# Patient Record
Sex: FEMALE | Race: WHITE | NOT HISPANIC OR LATINO | Employment: PART TIME | URBAN - NONMETROPOLITAN AREA
[De-identification: names, ages, dates, MRNs, and addresses within clinical notes are randomized per-mention and may not be internally consistent; named-entity substitution may affect disease eponyms.]

---

## 2017-01-06 ENCOUNTER — TRANSFERRED RECORDS (OUTPATIENT)
Dept: HEALTH INFORMATION MANAGEMENT | Facility: HOSPITAL | Age: 57
End: 2017-01-06

## 2017-02-23 ENCOUNTER — OFFICE VISIT (OUTPATIENT)
Dept: FAMILY MEDICINE | Facility: OTHER | Age: 57
End: 2017-02-23
Attending: PHYSICIAN ASSISTANT
Payer: COMMERCIAL

## 2017-02-23 ENCOUNTER — TELEPHONE (OUTPATIENT)
Dept: FAMILY MEDICINE | Facility: OTHER | Age: 57
End: 2017-02-23

## 2017-02-23 VITALS
OXYGEN SATURATION: 94 % | DIASTOLIC BLOOD PRESSURE: 76 MMHG | HEART RATE: 67 BPM | WEIGHT: 149 LBS | BODY MASS INDEX: 27.42 KG/M2 | HEIGHT: 62 IN | SYSTOLIC BLOOD PRESSURE: 106 MMHG | TEMPERATURE: 98 F

## 2017-02-23 DIAGNOSIS — Z71.89 ADVANCED DIRECTIVES, COUNSELING/DISCUSSION: Chronic | ICD-10-CM

## 2017-02-23 DIAGNOSIS — Z01.818 PREOP GENERAL PHYSICAL EXAM: Primary | ICD-10-CM

## 2017-02-23 LAB
ALBUMIN SERPL-MCNC: 4 G/DL (ref 3.4–5)
ALBUMIN UR-MCNC: NEGATIVE MG/DL
ALP SERPL-CCNC: 69 U/L (ref 40–150)
ALT SERPL W P-5'-P-CCNC: 20 U/L (ref 0–50)
AMORPH CRY #/AREA URNS HPF: ABNORMAL /HPF
ANION GAP SERPL CALCULATED.3IONS-SCNC: 7 MMOL/L (ref 3–14)
APPEARANCE UR: CLEAR
AST SERPL W P-5'-P-CCNC: 26 U/L (ref 0–45)
BACTERIA #/AREA URNS HPF: ABNORMAL /HPF
BASOPHILS # BLD AUTO: 0 10E9/L (ref 0–0.2)
BASOPHILS NFR BLD AUTO: 0.6 %
BILIRUB SERPL-MCNC: 0.4 MG/DL (ref 0.2–1.3)
BILIRUB UR QL STRIP: NEGATIVE
BUN SERPL-MCNC: 12 MG/DL (ref 7–30)
CALCIUM SERPL-MCNC: 9.1 MG/DL (ref 8.5–10.1)
CHLORIDE SERPL-SCNC: 105 MMOL/L (ref 94–109)
CO2 SERPL-SCNC: 31 MMOL/L (ref 20–32)
COLOR UR AUTO: ABNORMAL
CREAT SERPL-MCNC: 0.64 MG/DL (ref 0.52–1.04)
DIFFERENTIAL METHOD BLD: NORMAL
EOSINOPHIL # BLD AUTO: 0.2 10E9/L (ref 0–0.7)
EOSINOPHIL NFR BLD AUTO: 3 %
ERYTHROCYTE [DISTWIDTH] IN BLOOD BY AUTOMATED COUNT: 12.2 % (ref 10–15)
ERYTHROCYTE [SEDIMENTATION RATE] IN BLOOD BY WESTERGREN METHOD: 9 MM/H (ref 0–30)
GFR SERPL CREATININE-BSD FRML MDRD: NORMAL ML/MIN/1.7M2
GLUCOSE SERPL-MCNC: 84 MG/DL (ref 70–99)
GLUCOSE UR STRIP-MCNC: NEGATIVE MG/DL
HCT VFR BLD AUTO: 39.6 % (ref 35–47)
HGB BLD-MCNC: 13.7 G/DL (ref 11.7–15.7)
HGB UR QL STRIP: NEGATIVE
IMM GRANULOCYTES # BLD: 0 10E9/L (ref 0–0.4)
IMM GRANULOCYTES NFR BLD: 0.1 %
KETONES UR STRIP-MCNC: NEGATIVE MG/DL
LEUKOCYTE ESTERASE UR QL STRIP: NEGATIVE
LYMPHOCYTES # BLD AUTO: 2.9 10E9/L (ref 0.8–5.3)
LYMPHOCYTES NFR BLD AUTO: 41.1 %
MCH RBC QN AUTO: 29.8 PG (ref 26.5–33)
MCHC RBC AUTO-ENTMCNC: 34.6 G/DL (ref 31.5–36.5)
MCV RBC AUTO: 86 FL (ref 78–100)
MONOCYTES # BLD AUTO: 0.3 10E9/L (ref 0–1.3)
MONOCYTES NFR BLD AUTO: 4.6 %
MUCOUS THREADS #/AREA URNS LPF: PRESENT /LPF
NEUTROPHILS # BLD AUTO: 3.5 10E9/L (ref 1.6–8.3)
NEUTROPHILS NFR BLD AUTO: 50.6 %
NITRATE UR QL: NEGATIVE
NRBC # BLD AUTO: 0 10*3/UL
NRBC BLD AUTO-RTO: 0 /100
PH UR STRIP: 7 PH (ref 4.7–8)
PLATELET # BLD AUTO: 289 10E9/L (ref 150–450)
POTASSIUM SERPL-SCNC: 4 MMOL/L (ref 3.4–5.3)
PROT SERPL-MCNC: 7.4 G/DL (ref 6.8–8.8)
RBC # BLD AUTO: 4.6 10E12/L (ref 3.8–5.2)
RBC #/AREA URNS AUTO: 1 /HPF (ref 0–2)
SODIUM SERPL-SCNC: 143 MMOL/L (ref 133–144)
SP GR UR STRIP: 1.01 (ref 1–1.03)
URN SPEC COLLECT METH UR: ABNORMAL
UROBILINOGEN UR STRIP-MCNC: NORMAL MG/DL (ref 0–2)
WBC # BLD AUTO: 6.9 10E9/L (ref 4–11)
WBC #/AREA URNS AUTO: 1 /HPF (ref 0–2)

## 2017-02-23 PROCEDURE — 81001 URINALYSIS AUTO W/SCOPE: CPT | Performed by: PHYSICIAN ASSISTANT

## 2017-02-23 PROCEDURE — 85652 RBC SED RATE AUTOMATED: CPT | Performed by: PHYSICIAN ASSISTANT

## 2017-02-23 PROCEDURE — 93000 ELECTROCARDIOGRAM COMPLETE: CPT | Performed by: INTERNAL MEDICINE

## 2017-02-23 PROCEDURE — 99214 OFFICE O/P EST MOD 30 MIN: CPT | Mod: 25 | Performed by: PHYSICIAN ASSISTANT

## 2017-02-23 PROCEDURE — 80053 COMPREHEN METABOLIC PANEL: CPT | Performed by: PHYSICIAN ASSISTANT

## 2017-02-23 PROCEDURE — 36415 COLL VENOUS BLD VENIPUNCTURE: CPT | Performed by: PHYSICIAN ASSISTANT

## 2017-02-23 PROCEDURE — 85025 COMPLETE CBC W/AUTO DIFF WBC: CPT | Performed by: PHYSICIAN ASSISTANT

## 2017-02-23 RX ORDER — GUAIFENESIN/EPHEDRINE HCL 200-12.5MG
200 TABLET ORAL DAILY
COMMUNITY
End: 2017-12-14

## 2017-02-23 RX ORDER — MULTIPLE VITAMINS W/ MINERALS TAB 9MG-400MCG
1 TAB ORAL DAILY
COMMUNITY
End: 2019-08-14

## 2017-02-23 ASSESSMENT — PAIN SCALES - GENERAL: PAINLEVEL: SEVERE PAIN (7)

## 2017-02-23 NOTE — PROGRESS NOTES
[facility] [place]  [address]  [place] [address]  [phone]  Dept: [phone]    PRE-OP EVALUATION:  Today's date: 2017    Estella ORO Aas (: 1960) presents for pre-operative evaluation assessment as requested by Dr. Biswas.  She requires evaluation and anesthesia risk assessment prior to undergoing surgery/procedure for treatment of total hip .  Proposed procedure: total hip surgery    Date of Surgery/ Procedure: 2017  Time of Surgery/ Procedure:   Hospital/Surgical Facility: [facility]    Primary Physician: Mia Villagomez  Type of Anesthesia Anticipated: to be determined    Patient has a Health Care Directive or Living Will:  NO    1. NO - Do you have a history of heart attack, stroke, stent, bypass or surgery on an artery in the head, neck, heart or legs?  2. NO - Do you ever have any pain or discomfort in your chest?  3. NO - Do you have a history of  Heart Failure?  4. NO - Are you troubled by shortness of breath when: walking on the level, up a slight hill or at night?  5. NO - Do you currently have a cold, bronchitis or other respiratory infection?  6. NO - Do you have a cough, shortness of breath or wheezing?  7. NO - Do you sometimes get pains in the calves of your legs when you walk?  8. NO - Do you or anyone in your family have previous history of blood clots?  9. NO - Do you or does anyone in your family have a serious bleeding problem such as prolonged bleeding following surgeries or cuts?  10. NO - Have you ever had problems with anemia or been told to take iron pills?  11. NO - Have you had any abnormal blood loss such as black, tarry or bloody stools, or abnormal vaginal bleeding?  12. NO - Have you ever had a blood transfusion?  13. NO - Have you or any of your relatives ever had problems with anesthesia?  14. NO - Do you have sleep apnea, excessive snoring or daytime drowsiness?  15. NO - Do you have any prosthetic heart valves?  16. NO - Do you have  "prosthetic joints?  17. NO - Is there any chance that you may be pregnant?      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  She will be having a replacement on Wednesday 3/2/17 at [facility] in [place].       See problem list for active medical problems.  Problems all longstanding and stable, except as noted/documented.  See ROS for pertinent symptoms related to these conditions.                                                                                                  .    MEDICAL HISTORY:                                                    There are no active problems to display for this patient.     Past Medical History   Diagnosis Date     Depressive disorder, not elsewhere classified 7/23/2008     Dysthymic disorder 8/20/2008     Past Surgical History   Procedure Laterality Date     [place]       Hand  20009     RT     Colonoscopy  5/20/2006     Current Outpatient Prescriptions   Medication Sig Dispense Refill     multivitamin, therapeutic with minerals (MULTI-VITAMIN) TABS tablet Take 1 tablet by mouth daily       calcium-vitamin D (CALTRATE) 600-400 MG-UNIT per tablet Take 1 tablet by mouth 2 times daily       5-Hydroxytryptophan (5-HTP) 100 MG CAPS Take 200 mg by mouth daily       cholecalciferol (VITAMIN D3) 5000 UNITS TABS tablet Take 10,000 Units by mouth daily       [DISCONTINUED] Cholecalciferol (VITAMIN D) 2000 UNITS CAPS Take 1 tablet by mouth daily        OTC products: None, except as noted above    Allergies   Allergen Reactions     Cats      \"Cat/feline product derivatives\"      Latex Allergy: NO    Social History   Substance Use Topics     Smoking status: Light Tobacco Smoker     Packs/day: 0.50     Types: Cigarettes     Smokeless tobacco: Never Used      Comment: Quit 1990; no passive exposure     Alcohol use Yes      Comment: Socially     History   Drug Use No       REVIEW OF SYSTEMS:                  " "                                  C: NEGATIVE for fever, chills, change in weight  I: NEGATIVE for worrisome rashes, moles or lesions  E: NEGATIVE for vision changes or irritation  E/M: NEGATIVE for ear, mouth and throat problems  R: NEGATIVE for significant cough or SOB  CV: NEGATIVE for chest pain, palpitations or peripheral edema  GI: NEGATIVE for nausea, abdominal pain, heartburn, or change in bowel habits  : NEGATIVE for frequency, dysuria, or hematuria  M: NEGATIVE for significant arthralgias or myalgia  N: NEGATIVE for weakness, dizziness or paresthesias  E: NEGATIVE for temperature intolerance, skin/hair changes  H: NEGATIVE for bleeding problems  P: NEGATIVE for changes in mood or affect    EXAM:                                                    /76 (BP Location: Left arm, Patient Position: Chair, Cuff Size: Adult Regular)  Pulse 67  Temp 98  F (36.7  C) (Tympanic)  Ht 5' 1.5\" (1.562 m)  Wt 149 lb (67.6 kg)  SpO2 94%  BMI 27.7 kg/m2    GENERAL APPEARANCE: healthy, alert and no distress     EYES: EOMI,- PERRL     HENT: ear canals and TM's normal and nose and mouth without ulcers or lesions     NECK: no adenopathy, no asymmetry, masses, or scars and thyroid normal to palpation     RESP: lungs clear to auscultation - no rales, rhonchi or wheezes     CV: regular rates and rhythm, normal S1 S2, no S3 or S4 and no murmur, click or rub -     ABDOMEN:  soft, nontender, no HSM or masses and bowel sounds normal     MS: she is in severe pain and compensating with sitting walking.      SKIN: no suspicious lesions or rashes     NEURO: Normal strength and tone, sensory exam grossly normal     PSYCH: mentation appears normal. and affect normal/bright     LYMPHATICS: No axillary, cervical, inguinal, or supraclavicular nodes    DIAGNOSTICS:                                                    EKG: appears normal, NSR, normal axis, normal intervals, no acute ST/T changes c/w ischemia, no LVH by voltage criteria, " unchanged from previous tracings    Recent Labs   Lab Test  01/20/16   0848   HGB  13.0   PLT  335   NA  140   POTASSIUM  4.1   CR  0.71        IMPRESSION:                                                    Reason for surgery/procedure: left hip arthroplasty   Diagnosis/reason for consult: medical clear     The proposed surgical procedure is considered INTERMEDIATE risk.    REVISED CARDIAC RISK INDEX  The patient has the following serious cardiovascular risks for perioperative complications such as (MI, PE, VFib and 3  AV Block):  No serious cardiac risks  INTERPRETATION: 0 risks: Class I (very low risk - 0.4% complication rate)    The patient has the following additional risks for perioperative complications:          RECOMMENDATIONS:                                                      1. Advanced directives, counseling/discussion  Given     2. Preop general physical exam  Optimized. She will be seeing us back as recommended post op. She is also having an injection of her left thumb under anesthesia.   - EKG 12-lead complete w/read - (Clinic Performed)  - CBC with platelets and differential  - UA with Microscopic reflex to Culture  - Comprehensive metabolic panel  - Erythrocyte sedimentation rate auto      Pt has no medications to take.     APPROVAL GIVEN to proceed with proposed procedure, without further diagnostic evaluation       Signed Electronically by: FELIX Joe    Copy of this evaluation report is provided to requesting physician.    [facility] Preop Guidelines

## 2017-02-23 NOTE — MR AVS SNAPSHOT
After Visit Summary   2/23/2017    Estella ORO Aas    MRN: [id number]           Patient Information     Date Of Birth          1960        Visit Information        Provider Department      2/23/2017 1:45 PM Mia Villagomez PA [facility] [place]        Today's Diagnoses     Preop general physical exam    -  1    Advanced directives, counseling/discussion          Care Instructions      Before Your Surgery      Call your surgeon if there is any change in your health. This includes signs of a cold or flu (such as a sore throat, runny nose, cough, rash or fever).    Do not smoke, drink alcohol or take over the counter medicine (unless your surgeon or primary care doctor tells you to) for the 24 hours before and after surgery.    If you take prescribed drugs: Follow your doctor s orders about which medicines to take and which to stop until after surgery.    Eating and drinking prior to surgery: follow the instructions from your surgeon    Take a shower or bath the night before surgery. Use the soap your surgeon gave you to gently clean your skin. If you do not have soap from your surgeon, use your regular soap. Do not shave or scrub the surgery site.  Wear clean pajamas and have clean sheets on your bed.         Follow-ups after your visit        Who to contact     If you have questions or need follow up information about today's clinic visit or your schedule please contact [facility] directly at [phone].  Normal or non-critical lab and imaging results will be communicated to you by MyChart, letter or phone within 4 business days after the clinic has received the results. If you do not hear from us within 7 days, please contact the clinic through MyChart or phone. If you have a critical or abnormal lab result, we will notify you by phone as soon as possible.  Submit refill requests through [employer] or call your pharmacy and they will forward the refill request to us. Please  "allow 3 business days for your refill to be completed.          Additional Information About Your Visit        MyChart Information     [employer] gives you secure access to your electronic health record. If you see a primary care provider, you can also send messages to your care team and make appointments. If you have questions, please call your primary care clinic.  If you do not have a primary care provider, please call [phone] and they will assist you.        Care EveryWhere ID     This is your Care EveryWhere ID. This could be used by other organizations to access your [place] medical records  [id number]        Your Vitals Were     Pulse Temperature Height Pulse Oximetry BMI (Body Mass Index)       67 98  F (36.7  C) (Tympanic) 5' 1.5\" (1.562 m) 94% 27.7 kg/m2        Blood Pressure from Last 3 Encounters:   02/23/17 106/76   01/20/16 108/76   03/20/15 118/70    Weight from Last 3 Encounters:   02/23/17 149 lb (67.6 kg)   01/20/16 150 lb (68 kg)   03/20/15 153 lb (69.4 kg)              We Performed the Following     CBC with platelets and differential     Comprehensive metabolic panel     EKG 12-lead complete w/read - (Clinic Performed)     Erythrocyte sedimentation rate auto     UA with Microscopic reflex to Culture        Primary Care Provider Office Phone # Fax #    FELIX Davis [phone]       [facility] [address] [place] [address] 2  [facility] [address]        Thank you!     Thank you for choosing [facility]  for your care. Our goal is always to provide you with excellent care. Hearing back from our patients is one way we can continue to improve our services. Please take a few minutes to complete the written survey that you may receive in the mail after your visit with us. Thank you!             Your Updated Medication List - Protect others around you: Learn how to safely use, store and throw away your medicines at www.disposemymeds.org.          This list is " accurate as of: 2/23/17  2:39 PM.  Always use your most recent med list.                   Brand Name Dispense Instructions for use    5- MG Caps      Take 200 mg by mouth daily       calcium-vitamin D 600-400 MG-UNIT per tablet    CALTRATE     Take 1 tablet by mouth 2 times daily       cholecalciferol 5000 UNITS Tabs tablet    vitamin D3     Take 10,000 Units by mouth daily       Multi-vitamin Tabs tablet      Take 1 tablet by mouth daily

## 2017-02-23 NOTE — NURSING NOTE
"Chief Complaint   Patient presents with     Pre-Op Exam     *_* Health Care Directive *_*     declined       Initial /76 (BP Location: Left arm, Patient Position: Chair, Cuff Size: Adult Regular)  Pulse 67  Temp 98  F (36.7  C) (Tympanic)  Ht 5' 1.5\" (1.562 m)  Wt 149 lb (67.6 kg)  SpO2 94%  BMI 27.7 kg/m2 Estimated body mass index is 27.7 kg/(m^2) as calculated from the following:    Height as of this encounter: 5' 1.5\" (1.562 m).    Weight as of this encounter: 149 lb (67.6 kg).  Medication Reconciliation: complete   Gail Zambrano CMA(AAMA)     "

## 2017-03-02 ENCOUNTER — TRANSFERRED RECORDS (OUTPATIENT)
Dept: HEALTH INFORMATION MANAGEMENT | Facility: HOSPITAL | Age: 57
End: 2017-03-02

## 2017-03-10 ENCOUNTER — TELEPHONE (OUTPATIENT)
Dept: FAMILY MEDICINE | Facility: OTHER | Age: 57
End: 2017-03-10

## 2017-03-10 NOTE — TELEPHONE ENCOUNTER
10:40 AM    Reason for Call: Phone Call    Description: Pt called and she states that she had surgery on (03/02/17) and her Surgin will not be in until (03/24/17)  she was wondering if that is to long to have to wait to have the staples taken out.    Was an appointment offered for this call? No    Preferred method for responding to this message: Telephone [phone]    If we cannot reach you directly, may we leave a detailed response at the number you provided? Yes    Can this message wait until your PCP/provider returns, if available today? Not applicable,     Rosalie Diana

## 2017-03-16 ENCOUNTER — OFFICE VISIT (OUTPATIENT)
Dept: FAMILY MEDICINE | Facility: OTHER | Age: 57
End: 2017-03-16
Attending: NURSE PRACTITIONER
Payer: COMMERCIAL

## 2017-03-16 VITALS
OXYGEN SATURATION: 100 % | HEART RATE: 94 BPM | SYSTOLIC BLOOD PRESSURE: 114 MMHG | WEIGHT: 145 LBS | BODY MASS INDEX: 26.68 KG/M2 | HEIGHT: 62 IN | RESPIRATION RATE: 20 BRPM | TEMPERATURE: 98.6 F | DIASTOLIC BLOOD PRESSURE: 74 MMHG

## 2017-03-16 DIAGNOSIS — Z48.02 ENCOUNTER FOR STAPLE REMOVAL: Primary | ICD-10-CM

## 2017-03-16 PROCEDURE — 99212 OFFICE O/P EST SF 10 MIN: CPT | Performed by: NURSE PRACTITIONER

## 2017-03-16 ASSESSMENT — PAIN SCALES - GENERAL: PAINLEVEL: NO PAIN (0)

## 2017-03-16 NOTE — PROGRESS NOTES
SUBJECTIVE:                                                    Estella ORO Aas is a 56 year old female who presents to clinic today for the following health issues:    Noman presents today to have staples removed from the left hip. Noman states her surgeon is on vacation and was not able to make a follow up appointment for staple removal with him. Denies any signs or symptoms of infection.      Hospital Follow-up Visit:    Hospital/Nursing Home/IP Rehab Facility: [facility]  Date of Admission: 3-2-2017  Date of Discharge: 3-3-2017  Reason(s) for Admission: surgery left hip replacement            Problems taking medications regularly:  None       Medication changes since discharge: stopped blood thinner yesterday       Problems adhering to non-medication therapy:  None    Summary of hospitalization:  See outside records, reviewed and scanned  Diagnostic Tests/Treatments reviewed.  Follow up needed: none  Other Healthcare Providers Involved in Patient s Care:         None  Update since discharge: improved.     Post Discharge Medication Reconciliation: medication reconcilation previously completed during another office visit.  Plan of care communicated with patient     Coding guidelines for this visit:  Type of Medical   Decision Making Face-to-Face Visit       within 7 Days of discharge Face-to-Face Visit        within 14 days of discharge   Moderate Complexity [id number]   High Complexity [id number]                Problem list and histories reviewed & adjusted, as indicated.  Additional history: as documented    Patient Active Problem List   Diagnosis     Advanced directives, counseling/discussion     Past Surgical History   Procedure Laterality Date     [place]       Hand  20009     RT     Colonoscopy  5/20/2006     C total hip arthroplasty Left 03/02/2017     Basilar joint injection, left thumb  03/02/2017       Social History   Substance Use Topics     Smoking status: Light Tobacco Smoker     Packs/day: 0.50      "Types: Cigarettes     Smokeless tobacco: Never Used      Comment: Quit 1990; no passive exposure     Alcohol use Yes      Comment: Socially     Family History   Problem Relation Age of Onset     Other - See Comments Father      Alzheimer's Disease     C.A.D. Father      Hypertension Mother          Current Outpatient Prescriptions   Medication Sig Dispense Refill     multivitamin, therapeutic with minerals (MULTI-VITAMIN) TABS tablet Take 1 tablet by mouth daily       calcium-vitamin D (CALTRATE) 600-400 MG-UNIT per tablet Take 1 tablet by mouth 2 times daily       5-Hydroxytryptophan (5-HTP) 100 MG CAPS Take 200 mg by mouth daily       cholecalciferol (VITAMIN D3) 5000 UNITS TABS tablet Take 10,000 Units by mouth daily       Allergies   Allergen Reactions     Cats      \"Cat/feline product derivatives\"       Reviewed and updated as needed this visit by clinical staff  Tobacco  Allergies  Meds  Med Hx  Surg Hx  Fam Hx  Soc Hx      Reviewed and updated as needed this visit by Provider         ROS:  C: NEGATIVE for fever, chills, change in weight  INTEGUMENTARY/SKIN: surgical site starting to itch  E/M: NEGATIVE for ear, mouth and throat problems  R: NEGATIVE for significant cough or SOB  CV: NEGATIVE for chest pain, palpitations or peripheral edema    OBJECTIVE:                                                    /74 (BP Location: Right arm, Patient Position: Chair, Cuff Size: Adult Large)  Pulse 94  Temp 98.6  F (37  C) (Tympanic)  Resp 20  Ht 5' 1.5\" (1.562 m)  Wt 145 lb (65.8 kg)  SpO2 100%  BMI 26.95 kg/m2  Body mass index is 26.95 kg/(m^2).   GENERAL: healthy, alert and no distress  RESP: lungs clear to auscultation - no rales, rhonchi or wheezes  CV: regular rate and rhythm, normal S1 S2, no S3 or S4, no murmur, click or rub, no peripheral edema and peripheral pulses strong  MS: no gross musculoskeletal defects noted, no edema  SKIN: healing surgical site to left hip area - 18 staples removed and " steri-strips applied NEGATIVE for redness, drainage or swelling    Diagnostic Test Results:  none      ASSESSMENT:                                                      Removal of 18 staples and steri-strips applied to left hip area. Patient tolerated removal of stables.    PLAN:                                                    ASSESSMENT / PLAN:  (Z48.02) Encounter for staple removal  (primary encounter diagnosis)  Comment:   Plan:  Watch for sign or symptoms of infection   Leave steri-strips in place will fall off on own   Follow up with Dr Biswas as scheduled      Follow up with any concerns or sign and symptoms of infection      POONAM Corona [facility]

## 2017-03-16 NOTE — NURSING NOTE
"Chief Complaint   Patient presents with     Surgical Followup     here for staple removal Dr. Biswas left hip replacement 3-2-17.       Initial /74 (BP Location: Right arm, Patient Position: Chair, Cuff Size: Adult Large)  Pulse 94  Temp 98.6  F (37  C) (Tympanic)  Resp 20  Ht 5' 1.5\" (1.562 m)  Wt 145 lb (65.8 kg)  SpO2 100%  BMI 26.95 kg/m2 Estimated body mass index is 26.95 kg/(m^2) as calculated from the following:    Height as of this encounter: 5' 1.5\" (1.562 m).    Weight as of this encounter: 145 lb (65.8 kg).  Medication Reconciliation: complete   Citlalli Bentley      "

## 2017-03-16 NOTE — MR AVS SNAPSHOT
After Visit Summary   3/16/2017    Estella ORO Aas    MRN: [id number]           Patient Information     Date Of Birth          1960        Visit Information        Provider Department      3/16/2017 10:00 AM Heidi Aiken APRN [facility]        Care Instructions      Staple Removal (No Complication)  You were seen today for a suture removal. Your wound is healing as expected. It has healed well enough that the sutures or staples were ready to be removed. The wound will continue to heal below the surface of the skin for a few months. It is unlikely that you will have any further problem.  At this time there is no sign of a wound infection. Signs of a wound infection include:    Increasing redness or swelling around the wound    Increased warmth of the wound    Worsening pain    Red streaking lines away from the wound    Draining pus  Home care    Keep the wound clean and dry. Use an adhesive strip, if needed, to keep the wound from getting dirty for the next week.    Wash the wound carefully with soap and water daily during the next week.    You may shower and bathe as usual.    The wound may separate, or split open. If this happens, keep it clean and covered until you follow up or return for a recheck.    When exposed to the sun, scars can take on red or brown discoloration. To decrease scar color, protect the area from sun exposure. Use sun block for 6 to 12 months.  Follow-up care   Follow up with your healthcare provider, or as advised.  When to seek medical advice   Contact your healthcare provider right away if any of the following occur:    Increasing pain in the wound    Redness, swelling, or pus coming from the wound    Fever of 100.4 F (38 C) or higher, or as directed by your healthcare provider    [id number] [employer]. [address] [place] [address]. All rights reserved. This information is not intended as a substitute for  "professional medical care. Always follow your healthcare professional's instructions.              Follow-ups after your visit        Follow-up notes from your care team     Return if symptoms worsen or fail to improve.      Who to contact     If you have questions or need follow up information about today's clinic visit or your schedule please contact [facility] directly at [phone].  Normal or non-critical lab and imaging results will be communicated to you by MyChart, letter or phone within 4 business days after the clinic has received the results. If you do not hear from us within 7 days, please contact the clinic through [employer] or phone. If you have a critical or abnormal lab result, we will notify you by phone as soon as possible.  Submit refill requests through [employer] or call your pharmacy and they will forward the refill request to us. Please allow 3 business days for your refill to be completed.          Additional Information About Your Visit        [employer] Information     [employer] gives you secure access to your electronic health record. If you see a primary care provider, you can also send messages to your care team and make appointments. If you have questions, please call your primary care clinic.  If you do not have a primary care provider, please call [phone] and they will assist you.        Care EveryWhere ID     This is your Care EveryWhere ID. This could be used by other organizations to access your [place] medical records  [id number]        Your Vitals Were     Pulse Temperature Respirations Height Pulse Oximetry BMI (Body Mass Index)    94 98.6  F (37  C) (Tympanic) 20 5' 1.5\" (1.562 m) 100% 26.95 kg/m2       Blood Pressure from Last 3 Encounters:   03/16/17 114/74   02/23/17 106/76   01/20/16 108/76    Weight from Last 3 Encounters:   03/16/17 145 lb (65.8 kg)   02/23/17 149 lb (67.6 kg)   01/20/16 150 lb (68 kg)              Today, you had the following     No orders " found for display       Primary Care Provider Office Phone # Fax #    FELIX Davis [phone]       [facility] [address] [place] [address]        Thank you!     [facility]  [facility]. Our goal is always to provide you with excellent care. Hearing back from our patients is one way we can continue to improve our services. Please take a few minutes to complete the written survey that you may receive in the mail after your visit with us. Thank you!             Your Updated Medication List - Protect others around you: Learn how to safely use, store and throw away your medicines at www.disposemymeds.org.          This list is accurate as of: 3/16/17 10:10 AM.  Always use your most recent med list.                   Brand Name Dispense Instructions for use    5- MG Caps      Take 200 mg by mouth daily       calcium-vitamin D 600-400 MG-UNIT per tablet    CALTRATE     Take 1 tablet by mouth 2 times daily       cholecalciferol 5000 UNITS Tabs tablet    vitamin D3     Take 10,000 Units by mouth daily       Multi-vitamin Tabs tablet      Take 1 tablet by mouth daily

## 2017-03-16 NOTE — PATIENT INSTRUCTIONS
Staple Removal (No Complication)  You were seen today for a suture removal. Your wound is healing as expected. It has healed well enough that the sutures or staples were ready to be removed. The wound will continue to heal below the surface of the skin for a few months. It is unlikely that you will have any further problem.  At this time there is no sign of a wound infection. Signs of a wound infection include:    Increasing redness or swelling around the wound    Increased warmth of the wound    Worsening pain    Red streaking lines away from the wound    Draining pus  Home care    Keep the wound clean and dry. Use an adhesive strip, if needed, to keep the wound from getting dirty for the next week.    Wash the wound carefully with soap and water daily during the next week.    You may shower and bathe as usual.    The wound may separate, or split open. If this happens, keep it clean and covered until you follow up or return for a recheck.    When exposed to the sun, scars can take on red or brown discoloration. To decrease scar color, protect the area from sun exposure. Use sun block for 6 to 12 months.  Follow-up care   Follow up with your healthcare provider, or as advised.  When to seek medical advice   Contact your healthcare provider right away if any of the following occur:    Increasing pain in the wound    Redness, swelling, or pus coming from the wound    Fever of 100.4 F (38 C) or higher, or as directed by your healthcare provider    8204-3588 [employer]. [address] [place] [address], [place] [address]. All rights reserved. This information is not intended as a substitute for professional medical care. Always follow your healthcare professional's instructions.

## 2017-04-21 ENCOUNTER — TRANSFERRED RECORDS (OUTPATIENT)
Dept: HEALTH INFORMATION MANAGEMENT | Facility: HOSPITAL | Age: 57
End: 2017-04-21

## 2017-06-09 ENCOUNTER — TRANSFERRED RECORDS (OUTPATIENT)
Dept: HEALTH INFORMATION MANAGEMENT | Facility: HOSPITAL | Age: 57
End: 2017-06-09

## 2017-08-10 ENCOUNTER — OFFICE VISIT (OUTPATIENT)
Dept: CHIROPRACTIC MEDICINE | Facility: OTHER | Age: 57
End: 2017-08-10
Attending: CHIROPRACTOR
Payer: COMMERCIAL

## 2017-08-10 DIAGNOSIS — M99.02 SEGMENTAL AND SOMATIC DYSFUNCTION OF THORACIC REGION: ICD-10-CM

## 2017-08-10 DIAGNOSIS — M99.01 SEGMENTAL AND SOMATIC DYSFUNCTION OF CERVICAL REGION: Primary | ICD-10-CM

## 2017-08-10 DIAGNOSIS — M54.2 CERVICALGIA: ICD-10-CM

## 2017-08-10 PROCEDURE — 98940 CHIROPRACT MANJ 1-2 REGIONS: CPT | Mod: AT | Performed by: CHIROPRACTOR

## 2017-08-10 NOTE — PROGRESS NOTES
Subjective Finding:    Chief compalint: Patient presents with:  Neck Pain  , Pain Scale: 5/10, Intensity: sharp, Duration: 4 days, Change since last visit: , Radiating: no.    Date of injury:     Activities that the pain restricts:   Home/household/hobbies/social activities: no.  Work duties: no.  Sleep: no.  Makes symptoms better: rest.  Makes symptoms worse: activity.  Have you seen anyone else for the symptoms? No.  Work related: no.  Automobile related injury: no.    Objective and Assessment:    Posture Analysis:   High shoulder: right.  Head tilt: right.  High iliac crest: .  Head carriage: forward.  Thoracic Kyphosis: neutral.  Lumbar Lordosis: neutral.    Lumbar Range of Motion: extension decreased.  Cervical Range of Motion: extension decreased, left lateral flexion decreased and right lateral flexion decreased.  Thoracic Range of Motion: extension decreased.  Extremity Range of Motion: .    Palpation:   Lev scapulae: stiff, referred pain: no    Segmental dysfunction pre-treatment: C56  T34.    Assessment post-treatment:  Cervical: ROM increased.  Thoracic: ROM increased.  Lumbar: ROM increased.    Comments: .  Seeing ortho for hip issues.  Has had injections as well      Complicating Factors: .    Plan / Procedure:    Expected release date: .  Treatment plan: PRN.  Instructed patient: ice 20 minutes every other hour as needed.  Short term goals: reduce pain.  Long term goals: restore normal function.  Prognosis: excellent.

## 2017-08-10 NOTE — MR AVS SNAPSHOT
After Visit Summary   8/10/2017    Estella ORO Aas    MRN: [id number]           Patient Information     Date Of Birth          1960        Visit Information        Provider Department      8/10/2017 1:10 PM Duane Moses DC [facility]        Today's Diagnoses     Segmental and somatic dysfunction of cervical region    -  1    Cervicalgia        Segmental and somatic dysfunction of thoracic region           Follow-ups after your visit        Who to contact     If you have questions or need follow up information about today's clinic visit or your schedule please contact  [facility] DORA NOEL directly at [phone].  Normal or non-critical lab and imaging results will be communicated to you by [employer], letter or phone within 4 business days after the clinic has received the results. If you do not hear from us within 7 days, please contact the clinic through [employer] or phone. If you have a critical or abnormal lab result, we will notify you by phone as soon as possible.  Submit refill requests through Hotlist or call your pharmacy and they will forward the refill request to us. Please allow 3 business days for your refill to be completed.          Additional Information About Your Visit        MyChart Information     Hotlist gives you secure access to your electronic health record. If you see a primary care provider, you can also send messages to your care team and make appointments. If you have questions, please call your primary care clinic.  If you do not have a primary care provider, please call [phone] and they will assist you.        Care EveryWhere ID     This is your Care EveryWhere ID. This could be used by other organizations to access your [place] medical records  [id number]         Blood Pressure from Last 3 Encounters:   03/16/17 114/74   02/23/17 106/76   01/20/16 108/76    Weight from Last 3 Encounters:   03/16/17 145 lb (65.8 kg)   02/23/17 149 lb (67.6 kg)    01/20/16 150 lb (68 kg)              We Performed the Following     CHIROPRAC MANIP,SPINAL,1-2 REGIONS        Primary Care Provider Office Phone # Fax #    FELIX Davis [phone]       [facility] [address]  [facility] [address]        Equal Access to Services     [facility] : Hadii aad ku hadasho Soomaali, waaxda luqadaha, qaybta kaalmada adeegyada, waxay idiin hayaan adeeg kharash la'aan . So [facility] [phone].    ATENCIÓN: Si habla español, tiene a hammond disposición servicios gratuitos de asistencia lingüística. Radha al [phone].    We comply with applicable federal civil rights laws and [place] laws. We do not discriminate on the basis of race, color, national origin, age, disability sex, sexual orientation or gender identity.            Thank you!     Thank you for choosing  [facility] [place]  for your care. Our goal is always to provide you with excellent care. Hearing back from our patients is one way we can continue to improve our services. Please take a few minutes to complete the written survey that you may receive in the mail after your visit with us. Thank you!             Your Updated Medication List - Protect others around you: Learn how to safely use, store and throw away your medicines at www.disposemymeds.org.          This list is accurate as of: 8/10/17  1:57 PM.  Always use your most recent med list.                   Brand Name Dispense Instructions for use Diagnosis    5- MG Caps      Take 200 mg by mouth daily        calcium-vitamin D 600-400 MG-UNIT per tablet    CALTRATE     Take 1 tablet by mouth 2 times daily        cholecalciferol 5000 UNITS Tabs tablet    vitamin D3     Take 10,000 Units by mouth daily        Multi-vitamin Tabs tablet      Take 1 tablet by mouth daily

## 2017-09-07 ENCOUNTER — TELEPHONE (OUTPATIENT)
Dept: FAMILY MEDICINE | Facility: OTHER | Age: 57
End: 2017-09-07

## 2017-10-09 ENCOUNTER — OFFICE VISIT (OUTPATIENT)
Dept: CHIROPRACTIC MEDICINE | Facility: OTHER | Age: 57
End: 2017-10-09
Attending: CHIROPRACTOR
Payer: COMMERCIAL

## 2017-10-09 DIAGNOSIS — M99.02 SEGMENTAL AND SOMATIC DYSFUNCTION OF THORACIC REGION: ICD-10-CM

## 2017-10-09 DIAGNOSIS — M54.50 ACUTE BILATERAL LOW BACK PAIN WITHOUT SCIATICA: ICD-10-CM

## 2017-10-09 DIAGNOSIS — M99.03 SEGMENTAL AND SOMATIC DYSFUNCTION OF LUMBAR REGION: Primary | ICD-10-CM

## 2017-10-09 PROCEDURE — 98940 CHIROPRACT MANJ 1-2 REGIONS: CPT | Mod: AT | Performed by: CHIROPRACTOR

## 2017-10-09 NOTE — PROGRESS NOTES
Subjective Finding:    Chief compalint: Patient presents with:  Back Pain: from lifting boxes  , Pain Scale: 5/10, Intensity: sharp, Duration: 3 days, Change since last visit: , Radiating: no.    Date of injury:     Activities that the pain restricts:   Home/household/hobbies/social activities: no.  Work duties: no.  Sleep: no.  Makes symptoms better: rest.  Makes symptoms worse: activity.  Have you seen anyone else for the symptoms? No.  Work related: no.  Automobile related injury: no.    Objective and Assessment:    Posture Analysis:   High shoulder: right.  Head tilt: right.  High iliac crest: .  Head carriage: forward.  Thoracic Kyphosis: neutral.  Lumbar Lordosis: neutral.    Lumbar Range of Motion: extension decreased.  Cervical Range of Motion: .  Thoracic Range of Motion: extension decreased.  Extremity Range of Motion: .    Palpation:   Lev scapulae: stiff, referred pain: no  Lumbar paraspinals  Segmental dysfunction pre-treatment:L5  T34.    Assessment post-treatment:  Cervical: ROM increased.  Thoracic: ROM increased.  Lumbar: ROM increased.    Comments: .  Past left hip replacement      Complicating Factors: .    Plan / Procedure:    Expected release date: .  Treatment plan: PRN.  Instructed patient: ice 20 minutes every other hour as needed.  Short term goals: reduce pain.  Long term goals: restore normal function.  Prognosis: excellent.

## 2017-10-09 NOTE — MR AVS SNAPSHOT
After Visit Summary   10/9/2017    Estella ORO Aas    MRN: [id number]           Patient Information     Date Of Birth          1960        Visit Information        Provider Department      10/9/2017 9:10 AM Duane Moses DC  [facility]        Today's Diagnoses     Segmental and somatic dysfunction of lumbar region    -  1    Acute bilateral low back pain without sciatica        Segmental and somatic dysfunction of thoracic region           Follow-ups after your visit        Who to contact     If you have questions or need follow up information about today's clinic visit or your schedule please contact  [facility] directly at [phone].  Normal or non-critical lab and imaging results will be communicated to you by MyChart, letter or phone within 4 business days after the clinic has received the results. If you do not hear from us within 7 days, please contact the clinic through [employer] or phone. If you have a critical or abnormal lab result, we will notify you by phone as soon as possible.  Submit refill requests through [employer] or call your pharmacy and they will forward the refill request to us. Please allow 3 business days for your refill to be completed.          Additional Information About Your Visit        MyChart Information     [employer] gives you secure access to your electronic health record. If you see a primary care provider, you can also send messages to your care team and make appointments. If you have questions, please call your primary care clinic.  If you do not have a primary care provider, please call [phone] and they will assist you.        Care EveryWhere ID     This is your Care EveryWhere ID. This could be used by other organizations to access your [place] medical records  [id number]         Blood Pressure from Last 3 Encounters:   03/16/17 114/74   02/23/17 106/76   01/20/16 108/76    Weight from Last 3 Encounters:   03/16/17 145 lb (65.8  kg)   02/23/17 149 lb (67.6 kg)   01/20/16 150 lb (68 kg)              We Performed the Following     CHIROPRAC MANIP,SPINAL,1-2 REGIONS        Primary Care Provider Office Phone # Fax #    FELIX Davis [phone]       [facility] [address]  [place] [address]        Equal Access to Services     [facility] : Hadii aad ku hadasho Soomaali, waaxda luqadaha, qaybta kaalmada adeegyada, waxay idiin hayaan adeeg kharash la'aan . So [facility] [phone].    ATENCIÓN: Si surinder bhatt, tiene a hammond disposición servicios gratuitos de asistencia lingüística. Llame al [phone].    We comply with applicable federal civil rights laws and [place] laws. We do not discriminate on the basis of race, color, national origin, age, disability, sex, sexual orientation, or gender identity.            Thank you!     Thank you for choosing  CLINICS [facility] [place]  for your care. Our goal is always to provide you with excellent care. Hearing back from our patients is one way we can continue to improve our services. Please take a few minutes to complete the written survey that you may receive in the mail after your visit with us. Thank you!             Your Updated Medication List - Protect others around you: Learn how to safely use, store and throw away your medicines at www.disposemymeds.org.          This list is accurate as of: 10/9/17 11:30 AM.  Always use your most recent med list.                   Brand Name Dispense Instructions for use Diagnosis    5- MG Caps      Take 200 mg by mouth daily        calcium-vitamin D 600-400 MG-UNIT per tablet    CALTRATE     Take 1 tablet by mouth 2 times daily        cholecalciferol 5000 UNITS Tabs tablet    vitamin D3     Take 10,000 Units by mouth daily        Multi-vitamin Tabs tablet      Take 1 tablet by mouth daily

## 2017-10-10 ENCOUNTER — OFFICE VISIT (OUTPATIENT)
Dept: CHIROPRACTIC MEDICINE | Facility: OTHER | Age: 57
End: 2017-10-10
Attending: CHIROPRACTOR
Payer: COMMERCIAL

## 2017-10-10 DIAGNOSIS — M54.50 ACUTE BILATERAL LOW BACK PAIN WITHOUT SCIATICA: ICD-10-CM

## 2017-10-10 DIAGNOSIS — M99.03 SEGMENTAL AND SOMATIC DYSFUNCTION OF LUMBAR REGION: Primary | ICD-10-CM

## 2017-10-10 DIAGNOSIS — M99.02 SEGMENTAL AND SOMATIC DYSFUNCTION OF THORACIC REGION: ICD-10-CM

## 2017-10-10 PROCEDURE — 98940 CHIROPRACT MANJ 1-2 REGIONS: CPT | Mod: AT | Performed by: CHIROPRACTOR

## 2017-10-10 NOTE — MR AVS SNAPSHOT
After Visit Summary   10/10/2017    Estella ORO Aas    MRN: [id number]           Patient Information     Date Of Birth          1960        Visit Information        Provider Department      10/10/2017 9:40 AM Duane Moses DC  [facility]        Today's Diagnoses     Segmental and somatic dysfunction of lumbar region    -  1    Acute bilateral low back pain without sciatica        Segmental and somatic dysfunction of thoracic region           Follow-ups after your visit        Who to contact     If you have questions or need follow up information about today's clinic visit or your schedule please contact  [facility] directly at [phone].  Normal or non-critical lab and imaging results will be communicated to you by MyChart, letter or phone within 4 business days after the clinic has received the results. If you do not hear from us within 7 days, please contact the clinic through [employer] or phone. If you have a critical or abnormal lab result, we will notify you by phone as soon as possible.  Submit refill requests through [employer] ([employer]) or call your pharmacy and they will forward the refill request to us. Please allow 3 business days for your refill to be completed.          Additional Information About Your Visit        MyChart Information     [employer] ([employer]) gives you secure access to your electronic health record. If you see a primary care provider, you can also send messages to your care team and make appointments. If you have questions, please call your primary care clinic.  If you do not have a primary care provider, please call [phone] and they will assist you.        Care EveryWhere ID     This is your Care EveryWhere ID. This could be used by other organizations to access your [place] medical records  [id number]         Blood Pressure from Last 3 Encounters:   03/16/17 114/74   02/23/17 106/76   01/20/16 108/76    Weight from Last 3 Encounters:   03/16/17 145 lb (65.8  kg)   02/23/17 149 lb (67.6 kg)   01/20/16 150 lb (68 kg)              We Performed the Following     CHIROPRAC MANIP,SPINAL,1-2 REGIONS        Primary Care Provider Office Phone # Fax #    FELIX Davis [phone]       [facility] [address]  [place] [address]        Equal Access to Services     [facility] : Hadii aad ku hadasho Soomaali, waaxda luqadaha, qaybta kaalmada adeegyada, waxay idiin hayaan adeeg kharash la'aan . So [facility] [phone].    ATENCIÓN: Si surinder bhatt, tiene a hammond disposición servicios gratuitos de asistencia lingüística. Llame al [phone].    We comply with applicable federal civil rights laws and [place] laws. We do not discriminate on the basis of race, color, national origin, age, disability, sex, sexual orientation, or gender identity.            Thank you!     Thank you for choosing  [facility] [place]  for your care. Our goal is always to provide you with excellent care. Hearing back from our patients is one way we can continue to improve our services. Please take a few minutes to complete the written survey that you may receive in the mail after your visit with us. Thank you!             Your Updated Medication List - Protect others around you: Learn how to safely use, store and throw away your medicines at www.disposemymeds.org.          This list is accurate as of: 10/10/17 11:59 PM.  Always use your most recent med list.                   Brand Name Dispense Instructions for use Diagnosis    5- MG Caps      Take 200 mg by mouth daily        calcium-vitamin D 600-400 MG-UNIT per tablet    CALTRATE     Take 1 tablet by mouth 2 times daily        cholecalciferol 5000 UNITS Tabs tablet    vitamin D3     Take 10,000 Units by mouth daily        Multi-vitamin Tabs tablet      Take 1 tablet by mouth daily

## 2017-10-11 NOTE — PROGRESS NOTES
Subjective Finding:    Chief compalint: Patient presents with:  Back Pain: sharp pains this morning  , Pain Scale: 5/10, Intensity: sharp, Duration: 3 days, Change since last visit: , Radiating: no.    Date of injury:     Activities that the pain restricts:   Home/household/hobbies/social activities: no.  Work duties: no.  Sleep: no.  Makes symptoms better: rest.  Makes symptoms worse: activity.  Have you seen anyone else for the symptoms? No.  Work related: no.  Automobile related injury: no.    Objective and Assessment:    Posture Analysis:   High shoulder: right.  Head tilt: right.  High iliac crest: .  Head carriage: forward.  Thoracic Kyphosis: neutral.  Lumbar Lordosis: neutral.    Lumbar Range of Motion: extension decreased.  Cervical Range of Motion: .  Thoracic Range of Motion: extension decreased.  Extremity Range of Motion: .    Palpation:   Lev scapulae: stiff, referred pain: no  Lumbar paraspinals  Segmental dysfunction pre-treatment:L5  T34.    Assessment post-treatment:  Cervical: ROM increased.  Thoracic: ROM increased.  Lumbar: ROM increased.    Comments: .  Past left hip replacement      Complicating Factors: .    Plan / Procedure:    Expected release date: .  Treatment plan: PRN.  Instructed patient: ice 20 minutes every other hour as needed.  Short term goals: reduce pain.  Long term goals: restore normal function.  Prognosis: excellent.

## 2017-10-24 ENCOUNTER — OFFICE VISIT (OUTPATIENT)
Dept: CHIROPRACTIC MEDICINE | Facility: OTHER | Age: 57
End: 2017-10-24
Attending: CHIROPRACTOR
Payer: COMMERCIAL

## 2017-10-24 DIAGNOSIS — M99.02 SEGMENTAL AND SOMATIC DYSFUNCTION OF THORACIC REGION: ICD-10-CM

## 2017-10-24 DIAGNOSIS — M54.50 ACUTE BILATERAL LOW BACK PAIN WITHOUT SCIATICA: ICD-10-CM

## 2017-10-24 DIAGNOSIS — M99.03 SEGMENTAL AND SOMATIC DYSFUNCTION OF LUMBAR REGION: Primary | ICD-10-CM

## 2017-10-24 DIAGNOSIS — M99.01 SEGMENTAL AND SOMATIC DYSFUNCTION OF CERVICAL REGION: ICD-10-CM

## 2017-10-24 PROCEDURE — 98940 CHIROPRACT MANJ 1-2 REGIONS: CPT | Mod: AT | Performed by: CHIROPRACTOR

## 2017-10-24 NOTE — MR AVS SNAPSHOT
After Visit Summary   10/24/2017    Estella ORO Aas    MRN: [id number]           Patient Information     Date Of Birth          1960        Visit Information        Provider Department      10/24/2017 11:30 AM Duane Moses DC  [facility]        Today's Diagnoses     Segmental and somatic dysfunction of lumbar region    -  1    Acute bilateral low back pain without sciatica        Segmental and somatic dysfunction of thoracic region        Segmental and somatic dysfunction of cervical region           Follow-ups after your visit        Who to contact     If you have questions or need follow up information about today's clinic visit or your schedule please contact  [facility] directly at [phone].  Normal or non-critical lab and imaging results will be communicated to you by hiogihart, letter or phone within 4 business days after the clinic has received the results. If you do not hear from us within 7 days, please contact the clinic through hiogihart or phone. If you have a critical or abnormal lab result, we will notify you by phone as soon as possible.  Submit refill requests through [employer] or call your pharmacy and they will forward the refill request to us. Please allow 3 business days for your refill to be completed.          Additional Information About Your Visit        MyChart Information     [employer] gives you secure access to your electronic health record. If you see a primary care provider, you can also send messages to your care team and make appointments. If you have questions, please call your primary care clinic.  If you do not have a primary care provider, please call [phone] and they will assist you.        Care EveryWhere ID     This is your Care EveryWhere ID. This could be used by other organizations to access your [place] medical records  [id number]         Blood Pressure from Last 3 Encounters:   03/16/17 114/74   02/23/17 106/76   01/20/16  108/76    Weight from Last 3 Encounters:   03/16/17 145 lb (65.8 kg)   02/23/17 149 lb (67.6 kg)   01/20/16 150 lb (68 kg)              We Performed the Following     CHIROPRAC MANIP,SPINAL,3-4 REGIONS        Primary Care Provider Office Phone # Fax #    Mia PAYAN FELIX Villagomez [phone]       [facility] [address]  [place] [address]        Equal Access to Services     [facility] : Hadii aad ku hadasho Soomaali, waaxda luqadaha, qaybta kaalmada adeegyada, waxay idiin hayaan adeeg kharash la'amalian . So [facility] [phone].    ATENCIÓN: Si habla español, tiene a hammond disposición servicios gratuitos de asistencia lingüística. [facility] [phone].    We comply with applicable federal civil rights laws and [place] laws. We do not discriminate on the basis of race, color, national origin, age, disability, sex, sexual orientation, or gender identity.            Thank you!     Thank you for choosing  [facility]  for your care. Our goal is always to provide you with excellent care. Hearing back from our patients is one way we can continue to improve our services. Please take a few minutes to complete the written survey that you may receive in the mail after your visit with us. Thank you!             Your Updated Medication List - Protect others around you: Learn how to safely use, store and throw away your medicines at www.disposemymeds.org.          This list is accurate as of: 10/24/17 11:59 PM.  Always use your most recent med list.                   Brand Name Dispense Instructions for use Diagnosis    5- MG Caps      Take 200 mg by mouth daily        calcium-vitamin D 600-400 MG-UNIT per tablet    CALTRATE     Take 1 tablet by mouth 2 times daily        cholecalciferol 5000 UNITS Tabs tablet    vitamin D3     Take 10,000 Units by mouth daily        Multi-vitamin Tabs tablet      Take 1 tablet by mouth daily

## 2017-10-27 NOTE — PROGRESS NOTES
Subjective Finding:    Chief compalint: Patient presents with:  Back Pain: sharp pain in low back  Neck Pain  , Pain Scale: 5/10, Intensity: sharp, Duration: 3 days, Change since last visit: , Radiating: no.    Date of injury:     Activities that the pain restricts:   Home/household/hobbies/social activities: no.  Work duties: no.  Sleep: no.  Makes symptoms better: rest.  Makes symptoms worse: activity.  Have you seen anyone else for the symptoms? No.  Work related: no.  Automobile related injury: no.    Objective and Assessment:    Posture Analysis:   High shoulder: right.  Head tilt: right.  High iliac crest: .  Head carriage: forward.  Thoracic Kyphosis: neutral.  Lumbar Lordosis: neutral.    Lumbar Range of Motion: extension decreased.  Cervical Range of Motion: .  Thoracic Range of Motion: extension decreased.  Extremity Range of Motion: .    Palpation:   Lev scapulae: stiff, referred pain: no  Lumbar paraspinals  Segmental dysfunction pre-treatment:L5  T34.    Assessment post-treatment:  Cervical: ROM increased.  Thoracic: ROM increased.  Lumbar: ROM increased.    Comments: .  Past left hip replacement      Complicating Factors: .    Plan / Procedure:    Expected release date: .  Treatment plan: PRN.  Instructed patient: ice 20 minutes every other hour as needed.  Short term goals: reduce pain.  Long term goals: restore normal function.  Prognosis: excellent.

## 2017-11-17 ENCOUNTER — TRANSFERRED RECORDS (OUTPATIENT)
Dept: HEALTH INFORMATION MANAGEMENT | Facility: HOSPITAL | Age: 57
End: 2017-11-17

## 2017-11-26 ENCOUNTER — HEALTH MAINTENANCE LETTER (OUTPATIENT)
Age: 57
End: 2017-11-26

## 2017-12-14 ENCOUNTER — OFFICE VISIT (OUTPATIENT)
Dept: FAMILY MEDICINE | Facility: OTHER | Age: 57
End: 2017-12-14
Attending: PHYSICIAN ASSISTANT
Payer: COMMERCIAL

## 2017-12-14 VITALS
OXYGEN SATURATION: 98 % | HEART RATE: 70 BPM | DIASTOLIC BLOOD PRESSURE: 76 MMHG | HEIGHT: 61 IN | TEMPERATURE: 98 F | SYSTOLIC BLOOD PRESSURE: 112 MMHG | BODY MASS INDEX: 30.96 KG/M2 | RESPIRATION RATE: 16 BRPM | WEIGHT: 164 LBS

## 2017-12-14 DIAGNOSIS — Z01.818 PREOP GENERAL PHYSICAL EXAM: Primary | ICD-10-CM

## 2017-12-14 LAB
ERYTHROCYTE [DISTWIDTH] IN BLOOD BY AUTOMATED COUNT: 12.4 % (ref 10–15)
HCT VFR BLD AUTO: 38.1 % (ref 35–47)
HGB BLD-MCNC: 13.1 G/DL (ref 11.7–15.7)
MCH RBC QN AUTO: 29.3 PG (ref 26.5–33)
MCHC RBC AUTO-ENTMCNC: 34.4 G/DL (ref 31.5–36.5)
MCV RBC AUTO: 85 FL (ref 78–100)
PLATELET # BLD AUTO: 314 10E9/L (ref 150–450)
RBC # BLD AUTO: 4.47 10E12/L (ref 3.8–5.2)
WBC # BLD AUTO: 6.9 10E9/L (ref 4–11)

## 2017-12-14 PROCEDURE — 99214 OFFICE O/P EST MOD 30 MIN: CPT | Performed by: PHYSICIAN ASSISTANT

## 2017-12-14 PROCEDURE — 36415 COLL VENOUS BLD VENIPUNCTURE: CPT | Performed by: PHYSICIAN ASSISTANT

## 2017-12-14 PROCEDURE — 85027 COMPLETE CBC AUTOMATED: CPT | Performed by: PHYSICIAN ASSISTANT

## 2017-12-14 PROCEDURE — 93000 ELECTROCARDIOGRAM COMPLETE: CPT | Performed by: INTERNAL MEDICINE

## 2017-12-14 ASSESSMENT — ANXIETY QUESTIONNAIRES
GAD7 TOTAL SCORE: 0
5. BEING SO RESTLESS THAT IT IS HARD TO SIT STILL: NOT AT ALL
1. FEELING NERVOUS, ANXIOUS, OR ON EDGE: NOT AT ALL
2. NOT BEING ABLE TO STOP OR CONTROL WORRYING: NOT AT ALL
7. FEELING AFRAID AS IF SOMETHING AWFUL MIGHT HAPPEN: NOT AT ALL
6. BECOMING EASILY ANNOYED OR IRRITABLE: NOT AT ALL
IF YOU CHECKED OFF ANY PROBLEMS ON THIS QUESTIONNAIRE, HOW DIFFICULT HAVE THESE PROBLEMS MADE IT FOR YOU TO DO YOUR WORK, TAKE CARE OF THINGS AT HOME, OR GET ALONG WITH OTHER PEOPLE: NOT DIFFICULT AT ALL
3. WORRYING TOO MUCH ABOUT DIFFERENT THINGS: NOT AT ALL

## 2017-12-14 ASSESSMENT — PAIN SCALES - GENERAL: PAINLEVEL: EXTREME PAIN (8)

## 2017-12-14 ASSESSMENT — PATIENT HEALTH QUESTIONNAIRE - PHQ9
5. POOR APPETITE OR OVEREATING: NOT AT ALL
SUM OF ALL RESPONSES TO PHQ QUESTIONS 1-9: 4

## 2017-12-14 NOTE — PROGRESS NOTES
[facility] [place]  [address]  [place] [address]  [phone]  Dept: [phone]    PRE-OP EVALUATION:  Today's date: 2017    Estella ORO Aas (: 1960) presents for pre-operative evaluation assessment as requested by Dr. Biswas.  She requires evaluation and anesthesia risk assessment prior to undergoing surgery/procedure for treatment of worn out tumb joint .  Proposed procedure: left thumb surgery. Basilar reconstruction    Date of Surgery/ Procedure: 17  Time of Surgery/ Procedure: [facility]  Hospital/Surgical Facility: [facility]    Primary Physician: Mia Villagomez  Type of Anesthesia Anticipated: to be determined    Patient has a Health Care Directive or Living Will:  NO    1. NO - Do you have a history of heart attack, stroke, stent, bypass or surgery on an artery in the head, neck, heart or legs?  2. NO - Do you ever have any pain or discomfort in your chest?  3. NO - Do you have a history of  Heart Failure?  4. NO - Are you troubled by shortness of breath when: walking on the level, up a slight hill or at night?  5. NO - Do you currently have a cold, bronchitis or other respiratory infection?  6. NO - Do you have a cough, shortness of breath or wheezing?  7. NO - Do you sometimes get pains in the calves of your legs when you walk?  8. NO - Do you or anyone in your family have previous history of blood clots?  9. NO - Do you or does anyone in your family have a serious bleeding problem such as prolonged bleeding following surgeries or cuts?  10. NO - Have you ever had problems with anemia or been told to take iron pills?  11. NO - Have you had any abnormal blood loss such as black, tarry or bloody stools, or abnormal vaginal bleeding?  12. NO - Have you ever had a blood transfusion?  13. NO - Have you or any of your relatives ever had problems with anesthesia?  14. NO - Do you have sleep apnea, excessive snoring or daytime drowsiness?  15. NO - Do you have any  prosthetic heart valves?  16. YES - Do you have prosthetic joints? hip  17. NO - Is there any chance that you may be pregnant?        HPI:                                                      Brief HPI related to upcoming procedure: left thumb repair due to severe arthritis.  This will be done by Dr. Biswas at [facility] in [place].       See problem list for active medical problems.  Problems all longstanding and stable, except as noted/documented.  See ROS for pertinent symptoms related to these conditions.                                                                                                  .    MEDICAL HISTORY:                                                    Patient Active Problem List    Diagnosis Date Noted     Advanced directives, counseling/discussion 02/23/2017     Priority: Medium     Advance Care Planning 2/23/2017: ACP Review of Chart / Resources Provided:  Reviewed chart for advance care plan.  Estella ORO Aas has no plan or code status on file. Discussed available resources and provided with information. Confirmed code status reflects current choices pending further ACP discussions.  Confirmed/documented legally designated decision makers.  Added by Gail Zambrano              Past Medical History:   Diagnosis Date     Depressive disorder, not elsewhere classified 7/23/2008     Dysthymic disorder 8/20/2008     Past Surgical History:   Procedure Laterality Date     AS TOTAL HIP ARTHROPLASTY Left 03/02/2017     basilar joint injection, left thumb  03/02/2017     C TOTAL HIP ARTHROPLASTY Left 03/02/2017     COLONOSCOPY  5/20/2006     hand  [id number]    RT     GLADIS       Current Outpatient Prescriptions   Medication Sig Dispense Refill     multivitamin, therapeutic with minerals (MULTI-VITAMIN) TABS tablet Take 1 tablet by mouth daily       calcium-vitamin D (CALTRATE) 600-400 MG-UNIT per tablet Take 1 tablet by mouth 2 times daily       cholecalciferol (VITAMIN D3) 5000 UNITS TABS tablet Take 10,000  "Units by mouth daily       OTC products: None, except as noted above    Allergies   Allergen Reactions     Cats      \"Cat/feline product derivatives\"      Latex Allergy: NO    Social History   Substance Use Topics     Smoking status: Former Smoker     Packs/day: 0.50     Types: Cigarettes     Quit date: 10/1/2017     Smokeless tobacco: Never Used      Comment: Quit 1990; no passive exposure     Alcohol use Yes      Comment: Socially     History   Drug Use No       REVIEW OF SYSTEMS:                                                    C: NEGATIVE for fever, chills, change in weight  I: NEGATIVE for worrisome rashes, moles or lesions  E: NEGATIVE for vision changes or irritation  E/M: NEGATIVE for ear, mouth and throat problems  R: NEGATIVE for significant cough or SOB  B: NEGATIVE for masses, tenderness or discharge  CV: NEGATIVE for chest pain, palpitations or peripheral edema  GI: NEGATIVE for nausea, abdominal pain, heartburn, or change in bowel habits  : NEGATIVE for frequency, dysuria, or hematuria  MUSCULOSKELETAL:see HPI  N: NEGATIVE for weakness, dizziness or paresthesias  E: NEGATIVE for temperature intolerance, skin/hair changes  H: NEGATIVE for bleeding problems  P: NEGATIVE for changes in mood or affect    EXAM:                                                    /76 (BP Location: Left arm, Patient Position: Sitting, Cuff Size: Adult Regular)  Pulse 70  Temp 98  F (36.7  C) (Tympanic)  Resp 16  Ht 5' 1\" (1.549 m)  Wt 164 lb (74.4 kg)  SpO2 98%  BMI 30.99 kg/m2    GENERAL APPEARANCE: healthy, alert and no distress     EYES: EOMI, PERRL     HENT: ear canals and TM's normal and nose and mouth without ulcers or lesions     NECK: no adenopathy, no asymmetry, masses, or scars and thyroid normal to palpation     RESP: lungs clear to auscultation - no rales, rhonchi or wheezes     CV: regular rates and rhythm, normal S1 S2, no S3 or S4 and no murmur, click or rub     ABDOMEN:  soft, nontender, no HSM " or masses and bowel sounds normal     MS: has old scar on left hip from March hip replacement and also has left thumb arthritis and limited ROM her right thumb moves well.      SKIN: no suspicious lesions or rashes     NEURO: Normal strength and tone, sensory exam grossly normal, mentation intact and speech normal     PSYCH: mentation appears normal. and affect normal/bright     LYMPHATICS: No axillary, cervical, or supraclavicular nodes    DIAGNOSTICS:                                                      EKG: Normal Sinus Rhythm, normal axis, normal intervals, no acute ST/T changes c/w ischemia, no LVH by voltage criteria, unchanged from previous tracings  Labs Resulted Today:       Recent Labs   Lab Test  02/23/17   1413  01/20/16   0848   HGB  13.7  13.0   PLT  289  335   NA  143  140   POTASSIUM  4.0  4.1   CR  0.64  0.71      Lab Results   Component Value Date    WBC 6.9 12/14/2017     Lab Results   Component Value Date    RBC 4.47 12/14/2017     Lab Results   Component Value Date    HGB 13.1 12/14/2017     Lab Results   Component Value Date    HCT 38.1 12/14/2017     No components found for: MCT  Lab Results   Component Value Date    MCV 85 12/14/2017     Lab Results   Component Value Date    MCH 29.3 12/14/2017     Lab Results   Component Value Date    MCHC 34.4 12/14/2017     Lab Results   Component Value Date    RDW 12.4 12/14/2017     Lab Results   Component Value Date     12/14/2017       IMPRESSION:                                                    Reason for surgery/procedure: left basilar joint reconstruction.   Diagnosis/reason for consult: medical clearance.     The proposed surgical procedure is considered LOW risk.    REVISED CARDIAC RISK INDEX  The patient has the following serious cardiovascular risks for perioperative complications such as (MI, PE, VFib and 3  AV Block):  No serious cardiac risks  INTERPRETATION: 0 risks: Class I (very low risk - 0.4% complication rate)    The patient  has the following additional risks for perioperative complications:  No identified additional risks    No diagnosis found.  .labs  RECOMMENDATIONS:                                                      1. Preop general physical exam  Optimized.     - EKG 12-lead complete w/read - (Clinic Performed)  - CBC with platelets      APPROVAL GIVEN to proceed with proposed procedure, without further diagnostic evaluation       Signed Electronically by: FELIX Joe    Copy of this evaluation report is provided to requesting physician.    [facility] Preop Guidelines

## 2017-12-14 NOTE — MR AVS SNAPSHOT
After Visit Summary   12/14/2017    Estella ORO Aas    MRN: [id number]           Patient Information     Date Of Birth          1960        Visit Information        Provider Department      12/14/2017 3:30 PM Mia Villagomez PA [facility]        Today's Diagnoses     Preop general physical exam    -  1      Care Instructions      Before Your Surgery      Call your surgeon if there is any change in your health. This includes signs of a cold or flu (such as a sore throat, runny nose, cough, rash or fever).    Do not smoke, drink alcohol or take over the counter medicine (unless your surgeon or primary care doctor tells you to) for the 24 hours before and after surgery.    If you take prescribed drugs: Follow your doctor s orders about which medicines to take and which to stop until after surgery.    Eating and drinking prior to surgery: follow the instructions from your surgeon    Take a shower or bath the night before surgery. Use the soap your surgeon gave you to gently clean your skin. If you do not have soap from your surgeon, use your regular soap. Do not shave or scrub the surgery site.  Wear clean pajamas and have clean sheets on your bed.           Follow-ups after your visit        Who to contact     If you have questions or need follow up information about today's clinic visit or your schedule please contact [facility] directly at [phone].  Normal or non-critical lab and imaging results will be communicated to you by MyChart, letter or phone within 4 business days after the clinic has received the results. If you do not hear from us within 7 days, please contact the clinic through MyChart or phone. If you have a critical or abnormal lab result, we will notify you by phone as soon as possible.  Submit refill requests through [employer]Span or call your pharmacy and they will forward the refill request to us. Please allow 3 business days for your refill to be  "completed.          Additional Information About Your Visit        [facility] Information     [employer] gives you secure access to your electronic health record. If you see a primary care provider, you can also send messages to your care team and make appointments. If you have questions, please call your primary care clinic.  If you do not have a primary care provider, please call [phone] and they will assist you.        Care EveryWhere ID     This is your Care EveryWhere ID. This could be used by other organizations to access your [place] medical records  [id number]        Your Vitals Were     Pulse Temperature Respirations Height Pulse Oximetry BMI (Body Mass Index)    70 98  F (36.7  C) (Tympanic) 16 5' 1\" (1.549 m) 98% 30.99 kg/m2       Blood Pressure from Last 3 Encounters:   12/14/17 112/76   03/16/17 114/74   02/23/17 106/76    Weight from Last 3 Encounters:   12/14/17 164 lb (74.4 kg)   03/16/17 145 lb (65.8 kg)   02/23/17 149 lb (67.6 kg)              We Performed the Following     CBC with platelets     EKG 12-lead complete w/read - (Clinic Performed)        Primary Care Provider Office Phone # Fax #    FELIX Davis [phone]       [facility] [address] [facility] [address]  [facility] [address]        Equal Access to Services     FRAN CELIS : Hadii aad ku hadasho Soomaali, waaxda luqadaha, qaybta kaalmada adeegyada, feliz singletary . So [facility] [phone].    ATENCIÓN: Si habla español, tiene a hammond disposición servicios gratuitos de asistencia lingüística. Llame al [phone].    We comply with applicable federal civil rights laws and [place] laws. We do not discriminate on the basis of race, color, national origin, age, disability, sex, sexual orientation, or gender identity.            Thank you!     Thank you for choosing [facility]  for your care. Our goal is always to provide you with excellent care. Hearing back from our patients " is one way we can continue to improve our services. Please take a few minutes to complete the written survey that you may receive in the mail after your visit with us. Thank you!             Your Updated Medication List - Protect others around you: Learn how to safely use, store and throw away your medicines at www.disposemymeds.org.          This list is accurate as of: 12/14/17  4:19 PM.  Always use your most recent med list.                   Brand Name Dispense Instructions for use Diagnosis    calcium-vitamin D 600-400 MG-UNIT per tablet    CALTRATE     Take 1 tablet by mouth 2 times daily        cholecalciferol 5000 UNITS Tabs tablet    vitamin D3     Take 10,000 Units by mouth daily        Multi-vitamin Tabs tablet      Take 1 tablet by mouth daily

## 2017-12-14 NOTE — NURSING NOTE
"Chief Complaint   Patient presents with     Pre-Op Exam     12/21/17 Dr. Biswas. [facility]. left thumb       Initial /76 (BP Location: Left arm, Patient Position: Sitting, Cuff Size: Adult Regular)  Pulse 70  Temp 98  F (36.7  C) (Tympanic)  Resp 16  Ht 5' 1\" (1.549 m)  Wt 164 lb (74.4 kg)  SpO2 98%  BMI 30.99 kg/m2 Estimated body mass index is 30.99 kg/(m^2) as calculated from the following:    Height as of this encounter: 5' 1\" (1.549 m).    Weight as of this encounter: 164 lb (74.4 kg).  Medication Reconciliation: complete   Gali Foster LPN    "

## 2017-12-15 ASSESSMENT — ANXIETY QUESTIONNAIRES: GAD7 TOTAL SCORE: 0

## 2017-12-19 ENCOUNTER — TELEPHONE (OUTPATIENT)
Dept: FAMILY MEDICINE | Facility: OTHER | Age: 57
End: 2017-12-19

## 2018-01-22 ENCOUNTER — TRANSFERRED RECORDS (OUTPATIENT)
Dept: HEALTH INFORMATION MANAGEMENT | Facility: HOSPITAL | Age: 58
End: 2018-01-22

## 2018-04-20 ENCOUNTER — TRANSFERRED RECORDS (OUTPATIENT)
Dept: HEALTH INFORMATION MANAGEMENT | Facility: CLINIC | Age: 58
End: 2018-04-20

## 2018-08-29 ENCOUNTER — OFFICE VISIT (OUTPATIENT)
Dept: CHIROPRACTIC MEDICINE | Facility: OTHER | Age: 58
End: 2018-08-29
Attending: CHIROPRACTOR
Payer: COMMERCIAL

## 2018-08-29 DIAGNOSIS — M54.50 ACUTE BILATERAL LOW BACK PAIN WITHOUT SCIATICA: ICD-10-CM

## 2018-08-29 DIAGNOSIS — M99.02 SEGMENTAL AND SOMATIC DYSFUNCTION OF THORACIC REGION: ICD-10-CM

## 2018-08-29 DIAGNOSIS — M99.01 SEGMENTAL AND SOMATIC DYSFUNCTION OF CERVICAL REGION: ICD-10-CM

## 2018-08-29 DIAGNOSIS — M99.03 SEGMENTAL AND SOMATIC DYSFUNCTION OF LUMBAR REGION: Primary | ICD-10-CM

## 2018-08-29 PROCEDURE — 98941 CHIROPRACT MANJ 3-4 REGIONS: CPT | Mod: AT | Performed by: CHIROPRACTOR

## 2018-08-29 NOTE — PROGRESS NOTES
Subjective Finding:    Chief compalint: Patient presents with:  Back Pain  , Pain Scale: 5/10, Intensity: sharp, Duration: 3 days, Change since last visit: , Radiating: no.    Date of injury:     Activities that the pain restricts:   Home/household/hobbies/social activities: no.  Work duties: no.  Sleep: no.  Makes symptoms better: rest.  Makes symptoms worse: activity.  Have you seen anyone else for the symptoms? No.  Work related: no.  Automobile related injury: no.    Objective and Assessment:    Posture Analysis:   High shoulder: right.  Head tilt: right.  High iliac crest: .  Head carriage: forward.  Thoracic Kyphosis: neutral.  Lumbar Lordosis: neutral.    Lumbar Range of Motion: extension decreased.  Cervical Range of Motion: .  Thoracic Range of Motion: extension decreased.  Extremity Range of Motion: .    Palpation:   Lev scapulae: stiff, referred pain: no  Lumbar paraspinals  Segmental dysfunction pre-treatment:L5  T34.  C6    Assessment post-treatment:  Cervical: ROM increased.  Thoracic: ROM increased.  Lumbar: ROM increased.    Comments: .  Past left hip replacement      Complicating Factors: .    Plan / Procedure:    Expected release date: .  Treatment plan: PRN.  Instructed patient: ice 20 minutes every other hour as needed.  Short term goals: reduce pain.  Long term goals: restore normal function.  Prognosis: excellent.

## 2018-08-29 NOTE — MR AVS SNAPSHOT
After Visit Summary   8/29/2018    Estella ORO Aas    MRN: [id number]           Patient Information     Date Of Birth          1960        Visit Information        Provider Department      8/29/2018 11:40 AM Duane Moses DC  [facility]        Today's Diagnoses     Segmental and somatic dysfunction of lumbar region    -  1    Acute bilateral low back pain without sciatica        Segmental and somatic dysfunction of thoracic region        Segmental and somatic dysfunction of cervical region           Follow-ups after your visit        Who to contact     If you have questions or need follow up information about today's clinic visit or your schedule please contact  [facility] [place] directly at [phone].  Normal or non-critical lab and imaging results will be communicated to you by Bridgevinehart, letter or phone within 4 business days after the clinic has received the results. If you do not hear from us within 7 days, please contact the clinic through Bridgevinehart or phone. If you have a critical or abnormal lab result, we will notify you by phone as soon as possible.  Submit refill requests through [employer] or call your pharmacy and they will forward the refill request to us. Please allow 3 business days for your refill to be completed.          Additional Information About Your Visit        MyChart Information     [employer] gives you secure access to your electronic health record. If you see a primary care provider, you can also send messages to your care team and make appointments. If you have questions, please call your primary care clinic.  If you do not have a primary care provider, please call [phone] and they will assist you.        Care EveryWhere ID     This is your Care EveryWhere ID. This could be used by other organizations to access your [place] medical records  [id number]         Blood Pressure from Last 3 Encounters:   12/14/17 112/76   03/16/17 114/74   02/23/17  106/76    Weight from Last 3 Encounters:   12/14/17 164 lb (74.4 kg)   03/16/17 145 lb (65.8 kg)   02/23/17 149 lb (67.6 kg)              We Performed the Following     CHIROPRAC MANIP,SPINAL,3-4 REGIONS        Primary Care Provider Office Phone # Fax #    Mia FELIX Kunz [phone]       [facility] [address] [address]        Equal Access to Services     [facility] : Hadii aad ku hadasho Soomaali, waaxda luqadaha, qaybta kaalmada adeegyada, waxay idiin hayaan adeeg kharash la'aan . So [facility] [phone].    ATENCIÓN: Si habla español, tiene a hammond disposición servicios gratuitos de asistencia lingüística. [facility] [phone].    We comply with applicable federal civil rights laws and [place] laws. We do not discriminate on the basis of race, color, national origin, age, disability, sex, sexual orientation, or gender identity.            Thank you!     Thank you for choosing  [facility]  for your care. Our goal is always to provide you with excellent care. Hearing back from our patients is one way we can continue to improve our services. Please take a few minutes to complete the written survey that you may receive in the mail after your visit with us. Thank you!             Your Updated Medication List - Protect others around you: Learn how to safely use, store and throw away your medicines at www.disposemymeds.org.          This list is accurate as of 8/29/18  4:47 PM.  Always use your most recent med list.                   Brand Name Dispense Instructions for use Diagnosis    calcium carbonate 600 mg-vitamin D 400 units 600-400 MG-UNIT per tablet    CALTRATE     Take 1 tablet by mouth 2 times daily        cholecalciferol 5000 units Tabs tablet    vitamin D3     Take 10,000 Units by mouth daily        Multi-vitamin Tabs tablet      Take 1 tablet by mouth daily

## 2018-09-06 ENCOUNTER — OFFICE VISIT (OUTPATIENT)
Dept: CHIROPRACTIC MEDICINE | Facility: OTHER | Age: 58
End: 2018-09-06
Attending: CHIROPRACTOR
Payer: COMMERCIAL

## 2018-09-06 DIAGNOSIS — M99.03 SEGMENTAL AND SOMATIC DYSFUNCTION OF LUMBAR REGION: Primary | ICD-10-CM

## 2018-09-06 DIAGNOSIS — M99.02 SEGMENTAL AND SOMATIC DYSFUNCTION OF THORACIC REGION: ICD-10-CM

## 2018-09-06 DIAGNOSIS — M99.01 SEGMENTAL AND SOMATIC DYSFUNCTION OF CERVICAL REGION: ICD-10-CM

## 2018-09-06 DIAGNOSIS — M54.50 ACUTE RIGHT-SIDED LOW BACK PAIN WITHOUT SCIATICA: ICD-10-CM

## 2018-09-06 PROCEDURE — 98941 CHIROPRACT MANJ 3-4 REGIONS: CPT | Mod: AT | Performed by: CHIROPRACTOR

## 2018-09-06 NOTE — MR AVS SNAPSHOT
After Visit Summary   9/6/2018    Estella ORO Aas    MRN: [id number]           Patient Information     Date Of Birth          1960        Visit Information        Provider Department      9/6/2018 8:50 AM [facility]ane Moses [facility]        Today's Diagnoses     Segmental and somatic dysfunction of lumbar region    -  1    Acute right-sided low back pain without sciatica        Segmental and somatic dysfunction of thoracic region        Segmental and somatic dysfunction of cervical region           Follow-ups after your visit        Who to contact     If you have questions or need follow up information about today's clinic visit or your schedule please contact  [facility] directly at [phone].  Normal or non-critical lab and imaging results will be communicated to you by Six Month Smileshart, letter or phone within 4 business days after the clinic has received the results. If you do not hear from us within 7 days, please contact the clinic through [employer] Smileshart or phone. If you have a critical or abnormal lab result, we will notify you by phone as soon as possible.  Submit refill requests through [employer] or call your pharmacy and they will forward the refill request to us. Please allow 3 business days for your refill to be completed.          Additional Information About Your Visit        MyChart Information     [employer] gives you secure access to your electronic health record. If you see a primary care provider, you can also send messages to your care team and make appointments. If you have questions, please call your primary care clinic.  If you do not have a primary care provider, please call [phone] and they will assist you.        Care EveryWhere ID     This is your Care EveryWhere ID. This could be used by other organizations to access your [place] medical records  [id number]         Blood Pressure from Last 3 Encounters:   12/14/17 112/76   03/16/17 114/74   02/23/17 106/76     Weight from Last 3 Encounters:   12/14/17 164 lb (74.4 kg)   03/16/17 145 lb (65.8 kg)   02/23/17 149 lb (67.6 kg)              We Performed the Following     CHIROPRAC MANIP,SPINAL,3-4 REGIONS        Primary Care Provider Office Phone # Fax #    Mia PAYAN FELIX Villagomez [phone]       [address] [place] [address]        Equal Access to Services     [facility] : Hadii aad ku hadasho Soomaali, waaxda luqadaha, qaybta kaalmada adeegyada, waxay idiin hayaan adeeg kharash la'amalian . So [facility] [phone].    ATENCIÓN: Si habla español, tiene a hammond disposición servicios gratuitos de asistencia lingüística. [facility] [phone].    We comply with applicable federal civil rights laws and [place] laws. We do not discriminate on the basis of race, color, national origin, age, disability, sex, sexual orientation, or gender identity.            Thank you!     Thank you for choosing  [facility]  for your care. Our goal is always to provide you with excellent care. Hearing back from our patients is one way we can continue to improve our services. Please take a few minutes to complete the written survey that you may receive in the mail after your visit with us. Thank you!             Your Updated Medication List - Protect others around you: Learn how to safely use, store and throw away your medicines at www.disposemymeds.org.          This list is accurate as of 9/6/18 10:39 AM.  Always use your most recent med list.                   Brand Name Dispense Instructions for use Diagnosis    calcium carbonate 600 mg-vitamin D 400 units 600-400 MG-UNIT per tablet    CALTRATE     Take 1 tablet by mouth 2 times daily        cholecalciferol 5000 units Tabs tablet    vitamin D3     Take 10,000 Units by mouth daily        Multi-vitamin Tabs tablet      Take 1 tablet by mouth daily

## 2018-09-06 NOTE — PROGRESS NOTES
Subjective Finding:    Chief compalint: Patient presents with:  Back Pain: still having low back pain right sided  , Pain Scale: 5/10, Intensity: sharp, Duration: 3 days, Change since last visit: , Radiating: no.    Date of injury:     Activities that the pain restricts:   Home/household/hobbies/social activities: no.  Work duties: no.  Sleep: no.  Makes symptoms better: rest.  Makes symptoms worse: activity.  Have you seen anyone else for the symptoms? No.  Work related: no.  Automobile related injury: no.    Objective and Assessment:    Posture Analysis:   High shoulder: right.  Head tilt: right.  High iliac crest: .  Head carriage: forward.  Thoracic Kyphosis: neutral.  Lumbar Lordosis: neutral.    Lumbar Range of Motion: extension decreased.  Cervical Range of Motion: .  Thoracic Range of Motion: extension decreased.  Extremity Range of Motion: .    Palpation:   Lev scapulae: stiff, referred pain: no  Lumbar paraspinals  Segmental dysfunction pre-treatment:L5  T34.  C6    Assessment post-treatment:  Cervical: ROM increased.  Thoracic: ROM increased.  Lumbar: ROM increased.    Comments: .  Past left hip replacement      Complicating Factors: .    Plan / Procedure:    Expected release date: .  Treatment plan: PRN.  Instructed patient: ice 20 minutes every other hour as needed.  Short term goals: reduce pain.  Long term goals: restore normal function.  Prognosis: excellent.

## 2018-11-05 ENCOUNTER — APPOINTMENT (OUTPATIENT)
Dept: OCCUPATIONAL MEDICINE | Facility: OTHER | Age: 58
End: 2018-11-05

## 2019-05-24 ENCOUNTER — ANCILLARY PROCEDURE (OUTPATIENT)
Dept: MAMMOGRAPHY | Facility: OTHER | Age: 59
End: 2019-05-24
Attending: PHYSICIAN ASSISTANT
Payer: COMMERCIAL

## 2019-05-24 DIAGNOSIS — Z12.31 VISIT FOR SCREENING MAMMOGRAM: ICD-10-CM

## 2019-05-24 PROCEDURE — 77063 BREAST TOMOSYNTHESIS BI: CPT | Mod: TC

## 2019-05-24 PROCEDURE — 77067 SCR MAMMO BI INCL CAD: CPT | Mod: TC

## 2019-06-07 NOTE — PROGRESS NOTES
Us ri SUBJECTIVE:   CC: Estella ORO Aas is an 58 year old woman who presents for preventive health visit.     Healthy Habits:    Do you get at least three servings of calcium containing foods daily (dairy, green leafy vegetables, etc.)? yes    Amount of exercise or daily activities, outside of work: 3-4 day(s) per week    Problems taking medications regularly No    Medication side effects: No    Have you had an eye exam in the past two years? no    Do you see a dentist twice per year? yes    Do you have sleep apnea, excessive snoring or daytime drowsiness?daytime drowsiness          Today's PHQ-2 Score:   PHQ-2 (  [employer]) 2013   Q1: Little interest or pleasure in doing things 0   Q2: Feeling down, depressed or hopeless 0   PHQ-2 Score 0       Abuse: Current or Past(Physical, Sexual or Emotional)- No  Do you feel safe in your environment? Yes    Social History     Tobacco Use     Smoking status: Former Smoker     Packs/day: 0.50     Types: Cigarettes     Last attempt to quit: 10/1/2017     Years since quittin.6     Smokeless tobacco: Never Used     Tobacco comment: Quit ; no passive exposure   Substance Use Topics     Alcohol use: Yes     Comment: Socially     If you drink alcohol do you typically have >3 drinks per day or >7 drinks per week? No                     Reviewed orders with patient.  Reviewed health maintenance and updated orders accordingly - No  Lab work is in process  Labs reviewed in EPIC  BP Readings from Last 3 Encounters:   19 110/78   17 112/76   17 114/74    Wt Readings from Last 3 Encounters:   19 67.6 kg (149 lb)   17 74.4 kg (164 lb)   17 65.8 kg (145 lb)                  Patient Active Problem List   Diagnosis     Advanced directives, counseling/discussion     Past Surgical History:   Procedure Laterality Date     AS TOTAL HIP ARTHROPLASTY Left 2017     basilar joint injection, left thumb  2017     C TOTAL HIP ARTHROPLASTY Left  "2017     COLONOSCOPY  2006     hand  [id number]    RT     HYSTERECTOMY, GLADIS N/A     At age 39. Ovaries remain.        Social History     Tobacco Use     Smoking status: Former Smoker     Packs/day: 0.50     Types: Cigarettes     Last attempt to quit: 10/1/2017     Years since quittin.6     Smokeless tobacco: Never Used     Tobacco comment: Quit ; no passive exposure   Substance Use Topics     Alcohol use: Yes     Comment: Socially     Family History   Problem Relation Age of Onset     Other - See Comments Father         Alzheimer's Disease     C.A.D. Father      Hypertension Mother          Current Outpatient Medications   Medication Sig Dispense Refill     cholecalciferol (VITAMIN D3) 5000 UNITS TABS tablet Take 10,000 Units by mouth daily       multivitamin, therapeutic with minerals (MULTI-VITAMIN) TABS tablet Take 1 tablet by mouth daily       Allergies   Allergen Reactions     Cats      \"Cat/feline product derivatives\"     Recent Labs   Lab Test 17  1413 16  0848 14  1446 13  0832   LDL  --   --   --  98   HDL  --   --   --  61*   TRIG  --   --   --  81   ALT 20 18  --   --    CR 0.64 0.71  --   --    GFRESTIMATED >90  Non  GFR Calc   85  --   --    GFRESTBLACK >90   GFR Calc   >90   GFR Calc    --   --    POTASSIUM 4.0 4.1  --   --    TSH  --   --  1.32  --         Mammogram Screening: Patient over age 50, mutual decision to screen reflected in health maintenance.    Pertinent mammograms are reviewed under the imaging tab.  History of abnormal Pap smear: Last 3 Pap and HPV Results:       Reviewed and updated as needed this visit by clinical staff  Tobacco  Allergies  Meds  Med Hx  Surg Hx  Fam Hx  Soc Hx        Reviewed and updated as needed this visit by Provider          Past Medical History:   Diagnosis Date     Depressive disorder, not elsewhere classified 2008     Dysthymic disorder 2008      Past " Surgical History:   Procedure Laterality Date     AS TOTAL HIP ARTHROPLASTY Left 03/02/2017     basilar joint injection, left thumb  03/02/2017     C TOTAL HIP ARTHROPLASTY Left 03/02/2017     COLONOSCOPY  5/20/2006     hand  [id number]    RT     HYSTERECTOMY, GLADIS N/A     At age 39. Ovaries remain.      OB History   No data available       ROS:  CONSTITUTIONAL: NEGATIVE for fever, chills, change in weight  INTEGUMENTARY/SKIN: POSITIVE for redness and bloching. Itching all over.   EYES: NEGATIVE for vision changes or irritation  ENT: NEGATIVE for ear, mouth and throat problems  RESP: NEGATIVE for significant cough or SOB  BREAST: NEGATIVE for masses, tenderness or discharge  CV: NEGATIVE for chest pain, palpitations or peripheral edema  GI: NEGATIVE for nausea, abdominal pain, heartburn, or change in bowel habits  : NEGATIVE for unusual urinary or vaginal symptoms. No vaginal bleeding.  MUSCULOSKELETAL: NEGATIVE for significant arthralgias or myalgia  NEURO: NEGATIVE for weakness, dizziness or paresthesias  ENDOCRINE: NEGATIVE for temperature intolerance, skin/hair changes  HEME/ALLERGY/IMMUNE: NEGATIVE for bleeding problems  PSYCHIATRIC: NEGATIVE for changes in mood or affect     OBJECTIVE:   /78 (Patient Position: Sitting)   Pulse 65   Wt 67.6 kg (149 lb)   SpO2 96%   BMI 28.15 kg/m    EXAM:  GENERAL: healthy, alert and no distress  EYES: Eyes grossly normal to inspection, PERRL and conjunctivae and sclerae normal  HENT: ear canals and TM's normal, nose and mouth without ulcers or lesions  NECK: no adenopathy, no asymmetry, masses, or scars and thyroid normal to palpation  RESP: lungs clear to auscultation - no rales, rhonchi or wheezes  BREAST: palpated cystic structure on the right breast at 12 o'clock. , has tenderness but no nipple discharge and no palpable axillary masses or adenopathy  CV: regular rate and rhythm, normal S1 S2, no S3 or S4, no murmur, click or rub, no peripheral edema and peripheral  pulses strong  ABDOMEN: soft, nontender, no hepatosplenomegaly, no masses and bowel sounds normal  MS: no gross musculoskeletal defects noted, no edema  SKIN: scattered rash that is flesh to light pink colored.   NEURO: Normal strength and tone, mentation intact and speech normal  PSYCH: mentation appears normal, affect normal/bright    Diagnostic Test Results:  Labs reviewed in Epic  No results found for this or any previous visit (from the past 24 hour(s)).  Results for orders placed or performed in visit on 06/12/19   CBC with platelets differential   Result Value Ref Range    WBC 7.4 4.0 - 11.0 10e9/L    RBC Count 4.90 3.8 - 5.2 10e12/L    Hemoglobin 14.3 11.7 - 15.7 g/dL    Hematocrit 41.4 35.0 - 47.0 %    MCV 85 78 - 100 fl    MCH 29.2 26.5 - 33.0 pg    MCHC 34.5 31.5 - 36.5 g/dL    RDW 12.8 10.0 - 15.0 %    Platelet Count 317 150 - 450 10e9/L    Diff Method Automated Method     % Neutrophils 56.4 %    % Lymphocytes 36.4 %    % Monocytes 4.5 %    % Eosinophils 2.2 %    % Basophils 0.4 %    % Immature Granulocytes 0.1 %    Nucleated RBCs 0 0 /100    Absolute Neutrophil 4.2 1.6 - 8.3 10e9/L    Absolute Lymphocytes 2.7 0.8 - 5.3 10e9/L    Absolute Monocytes 0.3 0.0 - 1.3 10e9/L    Absolute Eosinophils 0.2 0.0 - 0.7 10e9/L    Absolute Basophils 0.0 0.0 - 0.2 10e9/L    Abs Immature Granulocytes 0.0 0 - 0.4 10e9/L    Absolute Nucleated RBC 0.0    Comprehensive metabolic panel   Result Value Ref Range    Sodium 139 133 - 144 mmol/L    Potassium 3.8 3.4 - 5.3 mmol/L    Chloride 106 94 - 109 mmol/L    Carbon Dioxide 28 20 - 32 mmol/L    Anion Gap 5 3 - 14 mmol/L    Glucose 91 70 - 99 mg/dL    Urea Nitrogen 14 7 - 30 mg/dL    Creatinine 0.65 0.52 - 1.04 mg/dL    GFR Estimate >90 >60 mL/min/[1.73_m2]    GFR Estimate If Black >90 >60 mL/min/[1.73_m2]    Calcium 9.2 8.5 - 10.1 mg/dL    Bilirubin Total 0.3 0.2 - 1.3 mg/dL    Albumin 4.2 3.4 - 5.0 g/dL    Protein Total 7.7 6.8 - 8.8 g/dL    Alkaline Phosphatase 79 40 - 150  "U/L    ALT 17 0 - 50 U/L    AST 14 0 - 45 U/L   Lipid Profile   Result Value Ref Range    Cholesterol 226 (H) <200 mg/dL    Triglycerides 127 <150 mg/dL    HDL Cholesterol 72 >49 mg/dL    LDL Cholesterol Calculated 129 (H) <100 mg/dL    Non HDL Cholesterol 154 (H) <130 mg/dL   Erythrocyte sedimentation rate auto   Result Value Ref Range    Sed Rate 9 0 - 30 mm/h       ASSESSMENT/PLAN:   1. Routine history and physical examination of adult  She is not had a physicial for quite some time. Mammogram done. Not 3 d. Has a cystic structure in right breast will ultrasound. Labs are ordered. Immunizations up dated. Advanced directive discussion.   - CBC with platelets differential  - Comprehensive metabolic panel  - Lipid Profile    2. Chest wall pain  Center of her mid sternum is painful to touch. Cartilage is tender and CXR is normal.   - XR CHEST 2 VW (Clinic Performed); Future  - Erythrocyte sedimentation rate auto    3. Dermatitis  Has a rash over 2 months spreading. Started on palms and feet. Itchy. Under bra and at waist. Not appearing paracystic. Driving her crazy itching. Not better on Claritin. See Derm.   - DERMATOLOGY REFERRAL  - Erythrocyte sedimentation rate auto  - Lyme Disease Rupa with reflex to WB Serum  - Rheumatoid factor  - DNA double stranded antibodies    4. Encounter for screening for HIV  done  - HIV Antigen Antibody Combo    5. Encounter for hepatitis C screening test for low risk patient  done  - Hepatitis C Screen Reflex to HCV RNA Quant and Genotype    COUNSELING:   Reviewed preventive health counseling, as reflected in patient instructions       Regular exercise       Healthy diet/nutrition       Vision screening       Immunizations    Vaccinated for: Pneumococcal and Zoster             Advance Care Planning    Estimated body mass index is 28.15 kg/m  as calculated from the following:    Height as of 12/14/17: 1.549 m (5' 1\").    Weight as of this encounter: 67.6 kg (149 lb).         reports " that she quit smoking about 20 months ago. Her smoking use included cigarettes. She smoked 0.50 packs per day. She has never used smokeless tobacco.      Counseling Resources:  ATP IV Guidelines  Pooled Cohorts Equation Calculator  Breast Cancer Risk Calculator  FRAX Risk Assessment  ICSI Preventive Guidelines  Dietary Guidelines for Americans, 2010  USDA's MyPlate  ASA Prophylaxis  Lung CA Screening    FELIX Joe  [facility] [place]

## 2019-06-12 ENCOUNTER — ANCILLARY PROCEDURE (OUTPATIENT)
Dept: GENERAL RADIOLOGY | Facility: OTHER | Age: 59
End: 2019-06-12
Attending: PHYSICIAN ASSISTANT
Payer: COMMERCIAL

## 2019-06-12 ENCOUNTER — OFFICE VISIT (OUTPATIENT)
Dept: FAMILY MEDICINE | Facility: OTHER | Age: 59
End: 2019-06-12
Attending: PHYSICIAN ASSISTANT
Payer: COMMERCIAL

## 2019-06-12 VITALS
OXYGEN SATURATION: 96 % | DIASTOLIC BLOOD PRESSURE: 78 MMHG | BODY MASS INDEX: 28.15 KG/M2 | WEIGHT: 149 LBS | HEART RATE: 65 BPM | SYSTOLIC BLOOD PRESSURE: 110 MMHG

## 2019-06-12 DIAGNOSIS — L30.9 DERMATITIS: ICD-10-CM

## 2019-06-12 DIAGNOSIS — Z11.59 ENCOUNTER FOR HEPATITIS C SCREENING TEST FOR LOW RISK PATIENT: ICD-10-CM

## 2019-06-12 DIAGNOSIS — Z11.4 ENCOUNTER FOR SCREENING FOR HIV: ICD-10-CM

## 2019-06-12 DIAGNOSIS — R07.89 CHEST WALL PAIN: ICD-10-CM

## 2019-06-12 DIAGNOSIS — Z23 NEED FOR VACCINATION: ICD-10-CM

## 2019-06-12 DIAGNOSIS — N60.01 BREAST CYST, RIGHT: ICD-10-CM

## 2019-06-12 DIAGNOSIS — Z00.00 ROUTINE HISTORY AND PHYSICAL EXAMINATION OF ADULT: Primary | ICD-10-CM

## 2019-06-12 LAB
ALBUMIN SERPL-MCNC: 4.2 G/DL (ref 3.4–5)
ALP SERPL-CCNC: 79 U/L (ref 40–150)
ALT SERPL W P-5'-P-CCNC: 17 U/L (ref 0–50)
ANION GAP SERPL CALCULATED.3IONS-SCNC: 5 MMOL/L (ref 3–14)
AST SERPL W P-5'-P-CCNC: 14 U/L (ref 0–45)
BASOPHILS # BLD AUTO: 0 10E9/L (ref 0–0.2)
BASOPHILS NFR BLD AUTO: 0.4 %
BILIRUB SERPL-MCNC: 0.3 MG/DL (ref 0.2–1.3)
BUN SERPL-MCNC: 14 MG/DL (ref 7–30)
CALCIUM SERPL-MCNC: 9.2 MG/DL (ref 8.5–10.1)
CHLORIDE SERPL-SCNC: 106 MMOL/L (ref 94–109)
CHOLEST SERPL-MCNC: 226 MG/DL
CO2 SERPL-SCNC: 28 MMOL/L (ref 20–32)
CREAT SERPL-MCNC: 0.65 MG/DL (ref 0.52–1.04)
DIFFERENTIAL METHOD BLD: NORMAL
EOSINOPHIL # BLD AUTO: 0.2 10E9/L (ref 0–0.7)
EOSINOPHIL NFR BLD AUTO: 2.2 %
ERYTHROCYTE [DISTWIDTH] IN BLOOD BY AUTOMATED COUNT: 12.8 % (ref 10–15)
ERYTHROCYTE [SEDIMENTATION RATE] IN BLOOD BY WESTERGREN METHOD: 9 MM/H (ref 0–30)
GFR SERPL CREATININE-BSD FRML MDRD: >90 ML/MIN/{1.73_M2}
GLUCOSE SERPL-MCNC: 91 MG/DL (ref 70–99)
HCT VFR BLD AUTO: 41.4 % (ref 35–47)
HDLC SERPL-MCNC: 72 MG/DL
HGB BLD-MCNC: 14.3 G/DL (ref 11.7–15.7)
IMM GRANULOCYTES # BLD: 0 10E9/L (ref 0–0.4)
IMM GRANULOCYTES NFR BLD: 0.1 %
LDLC SERPL CALC-MCNC: 129 MG/DL
LYMPHOCYTES # BLD AUTO: 2.7 10E9/L (ref 0.8–5.3)
LYMPHOCYTES NFR BLD AUTO: 36.4 %
MCH RBC QN AUTO: 29.2 PG (ref 26.5–33)
MCHC RBC AUTO-ENTMCNC: 34.5 G/DL (ref 31.5–36.5)
MCV RBC AUTO: 85 FL (ref 78–100)
MONOCYTES # BLD AUTO: 0.3 10E9/L (ref 0–1.3)
MONOCYTES NFR BLD AUTO: 4.5 %
NEUTROPHILS # BLD AUTO: 4.2 10E9/L (ref 1.6–8.3)
NEUTROPHILS NFR BLD AUTO: 56.4 %
NONHDLC SERPL-MCNC: 154 MG/DL
NRBC # BLD AUTO: 0 10*3/UL
NRBC BLD AUTO-RTO: 0 /100
PLATELET # BLD AUTO: 317 10E9/L (ref 150–450)
POTASSIUM SERPL-SCNC: 3.8 MMOL/L (ref 3.4–5.3)
PROT SERPL-MCNC: 7.7 G/DL (ref 6.8–8.8)
RBC # BLD AUTO: 4.9 10E12/L (ref 3.8–5.2)
SODIUM SERPL-SCNC: 139 MMOL/L (ref 133–144)
TRIGL SERPL-MCNC: 127 MG/DL
WBC # BLD AUTO: 7.4 10E9/L (ref 4–11)

## 2019-06-12 PROCEDURE — 90732 PPSV23 VACC 2 YRS+ SUBQ/IM: CPT | Performed by: PHYSICIAN ASSISTANT

## 2019-06-12 PROCEDURE — 71046 X-RAY EXAM CHEST 2 VIEWS: CPT | Mod: TC

## 2019-06-12 PROCEDURE — 86618 LYME DISEASE ANTIBODY: CPT | Mod: 90 | Performed by: PHYSICIAN ASSISTANT

## 2019-06-12 PROCEDURE — 86803 HEPATITIS C AB TEST: CPT | Mod: 90 | Performed by: PHYSICIAN ASSISTANT

## 2019-06-12 PROCEDURE — 90471 IMMUNIZATION ADMIN: CPT | Performed by: PHYSICIAN ASSISTANT

## 2019-06-12 PROCEDURE — 99000 SPECIMEN HANDLING OFFICE-LAB: CPT | Performed by: PHYSICIAN ASSISTANT

## 2019-06-12 PROCEDURE — 85652 RBC SED RATE AUTOMATED: CPT | Performed by: PHYSICIAN ASSISTANT

## 2019-06-12 PROCEDURE — 87522 HEPATITIS C REVRS TRNSCRPJ: CPT | Mod: 90 | Performed by: PHYSICIAN ASSISTANT

## 2019-06-12 PROCEDURE — 80061 LIPID PANEL: CPT | Performed by: PHYSICIAN ASSISTANT

## 2019-06-12 PROCEDURE — 87389 HIV-1 AG W/HIV-1&-2 AB AG IA: CPT | Mod: 90 | Performed by: PHYSICIAN ASSISTANT

## 2019-06-12 PROCEDURE — 36415 COLL VENOUS BLD VENIPUNCTURE: CPT | Performed by: PHYSICIAN ASSISTANT

## 2019-06-12 PROCEDURE — 86431 RHEUMATOID FACTOR QUANT: CPT | Mod: 90 | Performed by: PHYSICIAN ASSISTANT

## 2019-06-12 PROCEDURE — 99396 PREV VISIT EST AGE 40-64: CPT | Mod: 25 | Performed by: PHYSICIAN ASSISTANT

## 2019-06-12 PROCEDURE — 80053 COMPREHEN METABOLIC PANEL: CPT | Performed by: PHYSICIAN ASSISTANT

## 2019-06-12 PROCEDURE — 86225 DNA ANTIBODY NATIVE: CPT | Mod: 90 | Performed by: PHYSICIAN ASSISTANT

## 2019-06-12 PROCEDURE — 85025 COMPLETE CBC W/AUTO DIFF WBC: CPT | Performed by: PHYSICIAN ASSISTANT

## 2019-06-12 ASSESSMENT — PAIN SCALES - GENERAL: PAINLEVEL: NO PAIN (0)

## 2019-06-12 ASSESSMENT — ANXIETY QUESTIONNAIRES
4. TROUBLE RELAXING: SEVERAL DAYS
5. BEING SO RESTLESS THAT IT IS HARD TO SIT STILL: NOT AT ALL
2. NOT BEING ABLE TO STOP OR CONTROL WORRYING: SEVERAL DAYS
1. FEELING NERVOUS, ANXIOUS, OR ON EDGE: SEVERAL DAYS
3. WORRYING TOO MUCH ABOUT DIFFERENT THINGS: SEVERAL DAYS
6. BECOMING EASILY ANNOYED OR IRRITABLE: NOT AT ALL
7. FEELING AFRAID AS IF SOMETHING AWFUL MIGHT HAPPEN: NOT AT ALL
GAD7 TOTAL SCORE: 4

## 2019-06-12 ASSESSMENT — PATIENT HEALTH QUESTIONNAIRE - PHQ9: SUM OF ALL RESPONSES TO PHQ QUESTIONS 1-9: 6

## 2019-06-12 NOTE — LETTER
June 13, 2019      Estella M Jesus  [address] [place] [id number]        Dear ,    We are writing to inform you of your test results.    Your test results fall within the expected range(s) or remain unchanged from previous results.  Please continue with current treatment plan.    Resulted Orders   CBC with platelets differential   Result Value Ref Range    WBC 7.4 4.0 - 11.0 10e9/L    RBC Count 4.90 3.8 - 5.2 10e12/L    Hemoglobin 14.3 11.7 - 15.7 g/dL    Hematocrit 41.4 35.0 - 47.0 %    MCV 85 78 - 100 fl    MCH 29.2 26.5 - 33.0 pg    MCHC 34.5 31.5 - 36.5 g/dL    RDW 12.8 10.0 - 15.0 %    Platelet Count 317 150 - 450 10e9/L    Diff Method Automated Method     % Neutrophils 56.4 %    % Lymphocytes 36.4 %    % Monocytes 4.5 %    % Eosinophils 2.2 %    % Basophils 0.4 %    % Immature Granulocytes 0.1 %    Nucleated RBCs 0 0 /100    Absolute Neutrophil 4.2 1.6 - 8.3 10e9/L    Absolute Lymphocytes 2.7 0.8 - 5.3 10e9/L    Absolute Monocytes 0.3 0.0 - 1.3 10e9/L    Absolute Eosinophils 0.2 0.0 - 0.7 10e9/L    Absolute Basophils 0.0 0.0 - 0.2 10e9/L    Abs Immature Granulocytes 0.0 0 - 0.4 10e9/L    Absolute Nucleated RBC 0.0    Comprehensive metabolic panel   Result Value Ref Range    Sodium 139 133 - 144 mmol/L    Potassium 3.8 3.4 - 5.3 mmol/L    Chloride 106 94 - 109 mmol/L    Carbon Dioxide 28 20 - 32 mmol/L    Anion Gap 5 3 - 14 mmol/L    Glucose 91 70 - 99 mg/dL    Urea Nitrogen 14 7 - 30 mg/dL    Creatinine 0.65 0.52 - 1.04 mg/dL    GFR Estimate >90 >60 mL/min/[1.73_m2]      Comment:      Non  GFR Calc  Starting 12/18/2018, serum creatinine based estimated GFR (eGFR) will be   calculated using the Chronic Kidney Disease Epidemiology Collaboration   (CKD-EPI) equation.      GFR Estimate If Black >90 >60 mL/min/[1.73_m2]      Comment:       GFR Calc  Starting 12/18/2018, serum creatinine based estimated GFR (eGFR) will be   calculated using the Chronic Kidney Disease  Epidemiology Collaboration   (CKD-EPI) equation.      Calcium 9.2 8.5 - 10.1 mg/dL    Bilirubin Total 0.3 0.2 - 1.3 mg/dL    Albumin 4.2 3.4 - 5.0 g/dL    Protein Total 7.7 6.8 - 8.8 g/dL    Alkaline Phosphatase 79 40 - 150 U/L    ALT 17 0 - 50 U/L    AST 14 0 - 45 U/L   Lipid Profile   Result Value Ref Range    Cholesterol 226 (H) <200 mg/dL      Comment:      Desirable:       <200 mg/dl    Triglycerides 127 <150 mg/dL    HDL Cholesterol 72 >49 mg/dL    LDL Cholesterol Calculated 129 (H) <100 mg/dL      Comment:      Above desirable:  100-129 mg/dl  Borderline High:  130-159 mg/dL  High:             160-189 mg/dL  Very high:       >189 mg/dl      Non HDL Cholesterol 154 (H) <130 mg/dL      Comment:      Above Desirable:  130-159 mg/dl  Borderline high:  160-189 mg/dl  High:             190-219 mg/dl  Very high:       >219 mg/dl     Erythrocyte sedimentation rate auto   Result Value Ref Range    Sed Rate 9 0 - 30 mm/h   Lyme Disease Rupa with reflex to WB Serum   Result Value Ref Range    Lyme Disease Antibodies Serum 0.04 0.00 - 0.89      Comment:      Negative, Absence of detectable Borrelia burdorferi antibodies. A negative   result does not exclude the possibility of Borrelia burgdorferi infection. If   early Lyme disease is suspected, a second sample should be collected and   tested 2 to 4 weeks later.     Rheumatoid factor   Result Value Ref Range    Rheumatoid Factor <20 <20 IU/mL   DNA double stranded antibodies   Result Value Ref Range    DNA-ds 2 <10 IU/mL      Comment:      Negative       If you have any questions or concerns, please call the clinic at the number listed above.       Sincerely,        FELIX Joe

## 2019-06-12 NOTE — NURSING NOTE
"Chief Complaint   Patient presents with     Physical       Initial /78 (Patient Position: Sitting)   Pulse 65   Wt 67.6 kg (149 lb)   SpO2 96%   BMI 28.15 kg/m   Estimated body mass index is 28.15 kg/m  as calculated from the following:    Height as of 12/14/17: 1.549 m (5' 1\").    Weight as of this encounter: 67.6 kg (149 lb).  Medication Reconciliation: complete    Vivi Carlton LPN  "

## 2019-06-13 LAB
B BURGDOR IGG+IGM SER QL: 0.04 (ref 0–0.89)
DSDNA AB SER-ACNC: 2 IU/ML
RHEUMATOID FACT SER NEPH-ACNC: <20 IU/ML (ref 0–20)

## 2019-06-13 ASSESSMENT — ANXIETY QUESTIONNAIRES: GAD7 TOTAL SCORE: 4

## 2019-06-14 ENCOUNTER — HOSPITAL ENCOUNTER (OUTPATIENT)
Dept: ULTRASOUND IMAGING | Facility: HOSPITAL | Age: 59
Discharge: HOME | End: 2019-06-14
Attending: PHYSICIAN ASSISTANT | Admitting: PHYSICIAN ASSISTANT
Payer: COMMERCIAL

## 2019-06-14 DIAGNOSIS — N60.01 BREAST CYST, RIGHT: ICD-10-CM

## 2019-06-14 LAB — HIV 1+2 AB+HIV1 P24 AG SERPL QL IA: NONREACTIVE

## 2019-06-14 PROCEDURE — 76642 ULTRASOUND BREAST LIMITED: CPT | Mod: TC,RT

## 2019-06-16 ENCOUNTER — HOSPITAL ENCOUNTER (EMERGENCY)
Facility: HOSPITAL | Age: 59
Discharge: HOME | End: 2019-06-16
Attending: PHYSICIAN ASSISTANT | Admitting: PHYSICIAN ASSISTANT
Payer: COMMERCIAL

## 2019-06-16 VITALS
TEMPERATURE: 96.7 F | SYSTOLIC BLOOD PRESSURE: 124 MMHG | DIASTOLIC BLOOD PRESSURE: 82 MMHG | OXYGEN SATURATION: 100 % | RESPIRATION RATE: 16 BRPM | HEART RATE: 85 BPM

## 2019-06-16 DIAGNOSIS — L50.1 IDIOPATHIC URTICARIA: ICD-10-CM

## 2019-06-16 PROCEDURE — 99213 OFFICE O/P EST LOW 20 MIN: CPT | Mod: Z6 | Performed by: PHYSICIAN ASSISTANT

## 2019-06-16 PROCEDURE — G0463 HOSPITAL OUTPT CLINIC VISIT: HCPCS

## 2019-06-16 RX ORDER — PREDNISONE 20 MG/1
TABLET ORAL
Qty: 21 TABLET | Refills: 0 | Status: SHIPPED | OUTPATIENT
Start: 2019-06-16 | End: 2019-08-14

## 2019-06-16 RX ORDER — HYDROXYZINE PAMOATE 25 MG/1
25-50 CAPSULE ORAL 3 TIMES DAILY PRN
Qty: 20 CAPSULE | Refills: 1 | Status: SHIPPED | OUTPATIENT
Start: 2019-06-16 | End: 2019-08-14

## 2019-06-16 NOTE — ED AVS SNAPSHOT
[facility]  [address] [address]  Phone:  [phone]                                    Estella ORO Aas   MRN: [id number]    Department:  [facility]   Date of Visit:  6/16/2019           After Visit Summary Signature Page    I have received my discharge instructions, and my questions have been answered. I have discussed any challenges I see with this plan with the nurse or doctor.    ..........................................................................................................................................  Patient/Patient Representative Signature      ..........................................................................................................................................  Patient Representative Print Name and Relationship to Patient    ..................................................               ................................................  Date                                   Time    ..........................................................................................................................................  Reviewed by Signature/Title    ...................................................              ..............................................  Date                                               Time          22EPIC Rev 08/18

## 2019-06-17 LAB
HCV AB SERPL QL IA: REACTIVE
HCV RNA SERPL NAA+PROBE-ACNC: NORMAL [IU]/ML
HCV RNA SERPL NAA+PROBE-LOG IU: NORMAL LOG IU/ML

## 2019-06-17 NOTE — DISCHARGE INSTRUCTIONS
Take 150 mg Zantac (ranitidine) twice daily and 10 mg Zyrtec (cetirizine) twice daily for 7 days or as needed for itch or rash.

## 2019-06-17 NOTE — ED PROVIDER NOTES
"  History     Chief Complaint   Patient presents with     Hives     started having intermittent hives on her hands back in April, but today has hives \"all over\"     HPI  Estella ORO Aas is a 58 year old female who presents with complaints of severely itchy rash that started on hands a couple weeks ago but has been spreading to affect torso, arms, neck, face.  Rash is intermittent with no known triggers.  Has not improved with Claritin or topical steroids.  Patient saw PCP who completed tests that were unremarkable.  No difficulty with breathing or swallowing.    Allergies:  Allergies   Allergen Reactions     Cats      \"Cat/feline product derivatives\"       Problem List:    Patient Active Problem List    Diagnosis Date Noted     Advanced directives, counseling/discussion 2017     Priority: Medium     Advance Care Planning 2017: ACP Review of Chart / Resources Provided:  Reviewed chart for advance care plan.  Estella ORO Aas has no plan or code status on file. Discussed available resources and provided with information. Confirmed code status reflects current choices pending further ACP discussions.  Confirmed/documented legally designated decision makers.  Added by Gail Zambrano                Past Medical History:    Past Medical History:   Diagnosis Date     Depressive disorder, not elsewhere classified 2008     Dysthymic disorder 2008       Social History:  Marital Status:  Single [1]  Social History     Tobacco Use     Smoking status: Former Smoker     Packs/day: 0.50     Types: Cigarettes     Last attempt to quit: 10/1/2017     Years since quittin.7     Smokeless tobacco: Never Used     Tobacco comment: Quit ; no passive exposure   Substance Use Topics     Alcohol use: Yes     Comment: Socially     Drug use: No        Medications:      hydrOXYzine (VISTARIL) 25 MG capsule   predniSONE (DELTASONE) 20 MG tablet   cholecalciferol (VITAMIN D3) 5000 UNITS TABS tablet   multivitamin, " therapeutic with minerals (MULTI-VITAMIN) TABS tablet         Review of Systems :  HENT: Negative for difficulty with swallowing  Skin: Positive for rash    Physical Exam   BP: 124/82  Pulse: 85  Temp: 96.7  F (35.9  C)  Resp: 16  SpO2: 100 %    Physical Exam   Constitutional: Well-developed and well-nourished.   Head: Normocephalic and atraumatic.   Eyes: Conjunctivae and EOM are normal. Pupils are equal, round, and reactive to light.   Neck: Normal range of motion.   Pulmonary/Chest: Effort normal  Skin: Skin is warm and dry. Erythematous rash noted on face, neck, hands, chest.   Neuro: Gait normal.        ED Course               No results found for this or any previous visit (from the past 24 hour(s)).    Medications - No data to display    Assessments & Plan (with Medical Decision Making)     I have reviewed the nursing notes.    I have reviewed the findings, diagnosis, plan and need for follow up with the patient.  Take 150 mg Zantac (ranitidine) twice daily and 10 mg Zyrtec (cetirizine) twice daily for 7 days or as needed for itch or rash. F/u with derm if no improvement.       Medication List      Started    hydrOXYzine 25 MG capsule  Commonly known as:  VISTARIL  25-50 mg, Oral, 3 TIMES DAILY PRN     predniSONE 20 MG tablet  Commonly known as:  DELTASONE  2 tabs by mouth daily for 6 days, 1 tab daily for 6 days, 1/2 tab daily for 6 days            Final diagnoses:   Idiopathic urticaria       FELIX Castelan on 6/16/2019 at 8:46 PM   6/16/2019   [facility]       Christian Mullins PA  06/16/19 2046

## 2019-06-21 ENCOUNTER — TELEPHONE (OUTPATIENT)
Dept: FAMILY MEDICINE | Facility: OTHER | Age: 59
End: 2019-06-21

## 2019-06-21 DIAGNOSIS — L50.9 HIVES: Primary | ICD-10-CM

## 2019-06-21 NOTE — TELEPHONE ENCOUNTER
Patient called due to concerns with having hives while at her last appointment. Patient stated she is unable to get into dermatology earlier than October. Patient stated she did call [facility] and is able to get into dermatology earlier with Dr. Woody. Patient is requesting to have a referral to dermatology at [facility]. Patient stated this referral could be faxed to [phone]. Patient stated if there were any additional questions she could be contacted at [phone]. Please advise.

## 2019-06-25 ENCOUNTER — TELEPHONE (OUTPATIENT)
Dept: FAMILY MEDICINE | Facility: OTHER | Age: 59
End: 2019-06-25

## 2019-06-25 NOTE — TELEPHONE ENCOUNTER
Patient called today regarding a question on a DERM referral and also about how to have [facility] sent to new clinic.  Directed patient to [facility].

## 2019-08-12 ENCOUNTER — TELEPHONE (OUTPATIENT)
Dept: FAMILY MEDICINE | Facility: OTHER | Age: 59
End: 2019-08-12

## 2019-08-12 NOTE — TELEPHONE ENCOUNTER
ER follow-up for hives that aren't going away and would like to be seen.  States she is unable to get an appointment with a specialist until Nov and would like to see provider before that.  States she thinks it might be stress causing the hives.  Please call her and advise.    This Patient has requested an appointment for ER follow-up for hives    Patient is having the following symptoms hives    Patient has been having these symptoms for the following Duration:*months**    Please contact the patient at the following phone number [phone]

## 2019-08-14 ENCOUNTER — OFFICE VISIT (OUTPATIENT)
Dept: FAMILY MEDICINE | Facility: OTHER | Age: 59
End: 2019-08-14
Attending: PHYSICIAN ASSISTANT
Payer: COMMERCIAL

## 2019-08-14 VITALS
DIASTOLIC BLOOD PRESSURE: 70 MMHG | WEIGHT: 153 LBS | HEART RATE: 68 BPM | SYSTOLIC BLOOD PRESSURE: 118 MMHG | HEIGHT: 61 IN | BODY MASS INDEX: 28.89 KG/M2 | TEMPERATURE: 98.8 F | OXYGEN SATURATION: 98 %

## 2019-08-14 DIAGNOSIS — R42 VERTIGO: ICD-10-CM

## 2019-08-14 DIAGNOSIS — F41.1 GAD (GENERALIZED ANXIETY DISORDER): ICD-10-CM

## 2019-08-14 DIAGNOSIS — L50.1 CHRONIC IDIOPATHIC URTICARIA: Primary | ICD-10-CM

## 2019-08-14 PROCEDURE — 99214 OFFICE O/P EST MOD 30 MIN: CPT | Performed by: PHYSICIAN ASSISTANT

## 2019-08-14 RX ORDER — DIAZEPAM 2 MG
2 TABLET ORAL EVERY 8 HOURS PRN
Qty: 60 TABLET | Refills: 0 | Status: SHIPPED | OUTPATIENT
Start: 2019-08-14 | End: 2019-09-05

## 2019-08-14 RX ORDER — ESCITALOPRAM OXALATE 5 MG/1
5 TABLET ORAL DAILY
Qty: 60 TABLET | Refills: 0 | Status: SHIPPED | OUTPATIENT
Start: 2019-08-14 | End: 2019-09-05

## 2019-08-14 ASSESSMENT — PAIN SCALES - GENERAL: PAINLEVEL: NO PAIN (0)

## 2019-08-14 ASSESSMENT — MIFFLIN-ST. JEOR: SCORE: 1206.38

## 2019-08-14 NOTE — PATIENT INSTRUCTIONS
Thank you for choosing [facility].   I have office hours 8:00 am to 4:30 pm on Monday's, Wednesday's, Thursday's and Friday's. My nurse and I are out of the office every Tuesday.    Following your visit, when your labs and diagnostic testing have returned, I will review then and you will be contacted by my nurse.  If you are on My Chart, you can also view results there.    For refills, notify your pharmacy regarding what you need and the pharmacy will generate a refill request. Do not call my nurse as she is unable to process refill request. Please plan ahead and allow 3-5 days for refill requests.    You will generally receive a reminder call the day prior to your appointment.  If you cannot attend your appointment, please cancel your appointment with as much notice as possible.  If there is a pattern of failure to present for your appointments, I cannot provide consistent, meaningful, ongoing care for you. It is very important to me that you come in for your care, so we can best assist you with your health care needs.    IMPORTANT:  Please note that it is my standard of practice to NOT participate in prescribing ongoing requested Narcotic Analgesic therapy, and/or participate in the prescribing of other controlled substances.  My nurse and I am happy to assist you with the process of referral for alternative pain management as needed, and other treatment modalities including but not limited to:  Physical Therapy, Physical Medicine and Rehab, Counseling, Chiropractic Care, Orthopedic Care, and non-narcotic medication management.     In the event that you need to be seen for emergent concerns and I am out of office,  please see one of my colleagues for acute concerns.  You may also present to  or ER.  I appreciate the opportunity to serve you and look forward to supporting your healthcare needs in the future. Please contact me with any questions or concerns that you may  have.    Sincerely,      Mia Villagomez RN, PA-C

## 2019-08-14 NOTE — PROGRESS NOTES
"Nisha ORO Aas is a 58 year old female who presents to clinic today for the following health issues:    HPI   ED/UC Followup:    Facility:  [facility] [place]  Date of visit: 19  Reason for visit: idiopathic urticaria   Current Status: Hives           Patient Active Problem List   Diagnosis     Advanced directives, counseling/discussion     RADHA (generalized anxiety disorder)     Chronic idiopathic urticaria     Past Surgical History:   Procedure Laterality Date     AS TOTAL HIP ARTHROPLASTY Left 2017     basilar joint injection, left thumb  2017     C TOTAL HIP ARTHROPLASTY Left 2017     COLONOSCOPY  2006     hand  [id number]    RT     HYSTERECTOMY, GLADIS N/A     At age 39. Ovaries remain.        Social History     Tobacco Use     Smoking status: Former Smoker     Packs/day: 0.50     Types: Cigarettes     Last attempt to quit: 10/1/2017     Years since quittin.8     Smokeless tobacco: Never Used     Tobacco comment: Quit ; no passive exposure   Substance Use Topics     Alcohol use: Yes     Comment: Socially     Family History   Problem Relation Age of Onset     Other - See Comments Father         Alzheimer's Disease     C.A.D. Father      Hypertension Mother          Current Outpatient Medications   Medication Sig Dispense Refill     cholecalciferol (VITAMIN D3) 5000 UNITS TABS tablet Take 10,000 Units by mouth daily       diazepam (VALIUM) 2 MG tablet Take 1 tablet (2 mg) by mouth every 8 hours as needed (vertigo) 60 tablet 0     escitalopram (LEXAPRO) 5 MG tablet Take 1 tablet (5 mg) by mouth daily 60 tablet 0     Allergies   Allergen Reactions     Cats      \"Cat/feline product derivatives\"     Recent Labs   Lab Test 19  1220 17  1413 16  0848  14  1446 13  0832   *  --   --   --   --  98   HDL 72  --   --   --   --  61*   TRIG 127  --   --   --   --  81   ALT 17 20 18  --   --   --    CR 0.65 0.64 0.71   < >  --   --    GFRESTIMATED >90 >90  Non " " GFR Calc   85   < >  --   --    GFRESTBLACK >90 >90   GFR Calc   >90   GFR Calc     < >  --   --    POTASSIUM 3.8 4.0 4.1   < >  --   --    TSH  --   --   --   --  1.32  --     < > = values in this interval not displayed.      BP Readings from Last 3 Encounters:   19 118/70   19 124/82   19 110/78    Wt Readings from Last 3 Encounters:   19 69.4 kg (153 lb)   19 67.6 kg (149 lb)   17 74.4 kg (164 lb)                    Anxiety Follow-Up    How are you doing with your anxiety since your last visit? Worsened nothing helping her     Are you having other symptoms that might be associated with anxiety? Yes:  vertgo    Have you had a significant life event? Relationship Concerns     Are you feeling depressed? No    Do you have any concerns with your use of alcohol or other drugs? No    Social History     Tobacco Use     Smoking status: Former Smoker     Packs/day: 0.50     Types: Cigarettes     Last attempt to quit: 10/1/2017     Years since quittin.8     Smokeless tobacco: Never Used     Tobacco comment: Quit ; no passive exposure   Substance Use Topics     Alcohol use: Yes     Comment: Socially     Drug use: No     RADHA-7 SCORE 2017   Total Score 0 4     PHQ 2017   PHQ-9 Total Score 4 6   Q9: Thoughts of better off dead/self-harm past 2 weeks Not at all Not at all     No flowsheet data found.  No flowsheet data found.    Reviewed and updated as needed this visit by Provider  Tobacco  Allergies  Meds  Problems  Med Hx  Surg Hx  Fam Hx         Review of Systems   ROS COMP: Constitutional, HEENT, cardiovascular, pulmonary, gi and gu systems are negative, except as otherwise noted.      Objective    /70 (BP Location: Left arm, Patient Position: Sitting, Cuff Size: Adult Regular)   Pulse 68   Temp 98.8  F (37.1  C) (Tympanic)   Ht 1.549 m (5' 1\")   Wt 69.4 kg (153 lb)   SpO2 98%   BMI " 28.91 kg/m    Body mass index is 28.91 kg/m .  Physical Exam   GENERAL: healthy, alert and no distress  EYES: Eyes grossly normal to inspection, PERRL and conjunctivae and sclerae normal  HENT: ear canals and TM's normal, nose and mouth without ulcers or lesions  NECK: no adenopathy, no asymmetry, masses, or scars and thyroid normal to palpation  RESP: lungs clear to auscultation - no rales, rhonchi or wheezes  CV: regular rate and rhythm, normal S1 S2, no S3 or S4, no murmur, click or rub, no peripheral edema and peripheral pulses strong  ABDOMEN: soft, nontender, no hepatosplenomegaly, no masses and bowel sounds normal  MS: no gross musculoskeletal defects noted, no edema  PSYCH: mentation appears normal, anxious, fatigued, speech pressured and she is very self doubting today. Is working with a counselor and long discussion on this. 45 minutes in face to face discussion.     Diagnostic Test Results:  Labs reviewed in Epic  Results for orders placed or performed during the hospital encounter of 06/14/19   US Breast Right    Narrative    Exam: US BREAST RIGHT LIMITED 1-3 QUADRANTS    HISTORY: Right breast mass    COMPARISON: None    TECHNIQUE: Focused grayscale and color ultrasound of the upper-inner  and upper-outer quadrants of the right breast.    FINDINGS: At the site of palpable abnormality in the 12:00 position 4  cm from the nipple no masses were seen by ultrasound      Impression    IMPRESSION: Normal ultrasound of the right breast.     BI-RADS CODE:  1-Negative      ARMANDO GUARDADO MD           Assessment & Plan     1. Chronic idiopathic urticaria  She is very anxious overwhelmed and every time she talks of this her body breaks out in hives.  Working with counseling to help her solve the concerns in her relationship and many life changes.  Is working a new job now. Is crying alot  - escitalopram (LEXAPRO) 5 MG tablet; Take 1 tablet (5 mg) by mouth daily  Dispense: 60 tablet; Refill: 0    2.  "Vertigo  Positional to the right. Only on position changes. Given valium 2 mg to help her in the morning .  She is offered PT but will use U tube to see exercises she can do.  Avoid a lot of salt increase water consumption.   - diazepam (VALIUM) 2 MG tablet; Take 1 tablet (2 mg) by mouth every 8 hours as needed (vertigo)  Dispense: 60 tablet; Refill: 0     BMI:   Estimated body mass index is 28.91 kg/m  as calculated from the following:    Height as of this encounter: 1.549 m (5' 1\").    Weight as of this encounter: 69.4 kg (153 lb).           See Patient Instructions    Return in about 3 weeks (around 9/4/2019).    FELIX Joe  [facility] [place]      "

## 2019-08-14 NOTE — NURSING NOTE
"Chief Complaint   Patient presents with     ER F/U       Initial /70 (BP Location: Left arm, Patient Position: Sitting, Cuff Size: Adult Regular)   Pulse 68   Temp 98.8  F (37.1  C) (Tympanic)   Ht 1.549 m (5' 1\")   Wt 69.4 kg (153 lb)   SpO2 98%   BMI 28.91 kg/m   Estimated body mass index is 28.91 kg/m  as calculated from the following:    Height as of this encounter: 1.549 m (5' 1\").    Weight as of this encounter: 69.4 kg (153 lb).  Medication Reconciliation: complete  "

## 2019-08-18 PROBLEM — F41.1 GAD (GENERALIZED ANXIETY DISORDER): Status: ACTIVE | Noted: 2019-08-18

## 2019-08-18 PROBLEM — L50.1 CHRONIC IDIOPATHIC URTICARIA: Status: ACTIVE | Noted: 2019-08-18

## 2019-08-29 NOTE — PROGRESS NOTES
Subjective     Estella ORO Aas is a 58 year old female who presents to clinic today for the following health issues:    HPI   3 week follow up on Hives      Duration: 3 weeks    Description (location/character/radiation): had hives all over her body. Has since resolved     Intensity:  NA    Accompanying signs and symptoms: pt believes the hives were attributed to her anxiety. She has since started taking lexapro and has not had any breakouts since.    History (similar episodes/previous evaluation): None    Precipitating or alleviating factors: yes, had been ongoing since      Therapies tried and outcome: prednisone and hydroxyzine didn't help. Believes lexapro calming her anxiety has resolved the problem      Anxiety Follow-Up    How are you doing with your anxiety since your last visit? Improved      Are you having other symptoms that might be associated with anxiety? Yes:  hives    Have you had a significant life event? No     Are you feeling depressed? Yes:  able to talk her self out of it now. more motivated.     Do you have any concerns with your use of alcohol or other drugs? No    Social History     Tobacco Use     Smoking status: Former Smoker     Packs/day: 0.50     Types: Cigarettes     Last attempt to quit: 10/1/2017     Years since quittin.9     Smokeless tobacco: Never Used     Tobacco comment: Quit ; no passive exposure   Substance Use Topics     Alcohol use: Yes     Comment: Socially     Drug use: No     RADHA-7 SCORE 2017   Total Score 0 4     PHQ 2017   PHQ-9 Total Score 4 6   Q9: Thoughts of better off dead/self-harm past 2 weeks Not at all Not at all     No flowsheet data found.  No flowsheet data found.      Patient Active Problem List   Diagnosis     Advanced directives, counseling/discussion     RADHA (generalized anxiety disorder)     Chronic idiopathic urticaria     Past Surgical History:   Procedure Laterality Date     AS TOTAL HIP ARTHROPLASTY Left  "2017     basilar joint injection, left thumb  2017     C TOTAL HIP ARTHROPLASTY Left 2017     COLONOSCOPY  2006     hand  [id number]    RT     HYSTERECTOMY, GLADIS N/A     At age 39. Ovaries remain.        Social History     Tobacco Use     Smoking status: Former Smoker     Packs/day: 0.50     Types: Cigarettes     Last attempt to quit: 10/1/2017     Years since quittin.9     Smokeless tobacco: Never Used     Tobacco comment: Quit ; no passive exposure   Substance Use Topics     Alcohol use: Yes     Comment: Socially     Family History   Problem Relation Age of Onset     Other - See Comments Father         Alzheimer's Disease     C.A.D. Father      Hypertension Mother          Current Outpatient Medications   Medication Sig Dispense Refill     cholecalciferol (VITAMIN D3) 5000 UNITS TABS tablet Take 10,000 Units by mouth daily       escitalopram (LEXAPRO) 5 MG tablet Take 1 tablet (5 mg) by mouth daily 60 tablet 0     Allergies   Allergen Reactions     Cats      \"Cat/feline product derivatives\"     Recent Labs   Lab Test 19  1220 17  1413 16  0848  14  1446 13  0832   *  --   --   --   --  98   HDL 72  --   --   --   --  61*   TRIG 127  --   --   --   --  81   ALT 17 20 18  --   --   --    CR 0.65 0.64 0.71   < >  --   --    GFRESTIMATED >90 >90  Non  GFR Calc   85   < >  --   --    GFRESTBLACK >90 >90   GFR Calc   >90   GFR Calc     < >  --   --    POTASSIUM 3.8 4.0 4.1   < >  --   --    TSH  --   --   --   --  1.32  --     < > = values in this interval not displayed.      BP Readings from Last 3 Encounters:   19 92/70   19 118/70   19 124/82    Wt Readings from Last 3 Encounters:   19 69.4 kg (153 lb)   19 69.4 kg (153 lb)   19 67.6 kg (149 lb)                      Reviewed and updated as needed this visit by Provider         Review of Systems   ROS COMP: Constitutional, " "HEENT, cardiovascular, pulmonary, gi and gu systems are negative, except as otherwise noted.      Objective    BP 92/70 (Patient Position: Sitting)   Pulse 76   Wt 69.4 kg (153 lb)   SpO2 95%   BMI 28.91 kg/m    Body mass index is 28.91 kg/m .  Physical Exam   GENERAL: healthy, alert and no distress  EYES: Eyes grossly normal to inspection, PERRL and conjunctivae and sclerae normal  HENT: ear canals and TM's normal, nose and mouth without ulcers or lesions  NECK: no adenopathy, no asymmetry, masses, or scars and thyroid normal to palpation  RESP: lungs clear to auscultation - no rales, rhonchi or wheezes  CV: regular rate and rhythm, normal S1 S2, no S3 or S4, no murmur, click or rub, no peripheral edema and peripheral pulses strong  ABDOMEN: soft, nontender, no hepatosplenomegaly, no masses and bowel sounds normal  MS: no gross musculoskeletal defects noted, no edema  Psyche:    Diagnostic Test Results:  Labs reviewed in Epic  No results found for this or any previous visit (from the past 24 hour(s)).        Assessment & Plan     1. Chronic idiopathic urticaria  She is going to be given refill. Doing better. Able to talk herself down.   - escitalopram (LEXAPRO) 5 MG tablet; Take 1 tablet (5 mg) by mouth daily  Dispense: 60 tablet; Refill: 3    2. RADHA (generalized anxiety disorder)  She is doing much better. No hives and improved coping. Will continue. And recheck with us in 5 months.   - escitalopram (LEXAPRO) 5 MG tablet; Take 1 tablet (5 mg) by mouth daily  Dispense: 60 tablet; Refill: 3       BMI:   Estimated body mass index is 28.91 kg/m  as calculated from the following:    Height as of 8/14/19: 1.549 m (5' 1\").    Weight as of this encounter: 69.4 kg (153 lb).           See Patient Instructions    No follow-ups on file.    FELIX Joe  [facility] - [place]      "

## 2019-09-05 ENCOUNTER — OFFICE VISIT (OUTPATIENT)
Dept: FAMILY MEDICINE | Facility: OTHER | Age: 59
End: 2019-09-05
Attending: PHYSICIAN ASSISTANT
Payer: COMMERCIAL

## 2019-09-05 VITALS
OXYGEN SATURATION: 95 % | HEART RATE: 76 BPM | BODY MASS INDEX: 28.91 KG/M2 | SYSTOLIC BLOOD PRESSURE: 92 MMHG | WEIGHT: 153 LBS | DIASTOLIC BLOOD PRESSURE: 70 MMHG

## 2019-09-05 DIAGNOSIS — L50.1 CHRONIC IDIOPATHIC URTICARIA: ICD-10-CM

## 2019-09-05 DIAGNOSIS — F41.1 GAD (GENERALIZED ANXIETY DISORDER): ICD-10-CM

## 2019-09-05 PROCEDURE — 99213 OFFICE O/P EST LOW 20 MIN: CPT | Performed by: PHYSICIAN ASSISTANT

## 2019-09-05 RX ORDER — ESCITALOPRAM OXALATE 5 MG/1
5 TABLET ORAL DAILY
Qty: 60 TABLET | Refills: 3 | Status: SHIPPED | OUTPATIENT
Start: 2019-09-05 | End: 2019-11-14

## 2019-09-05 ASSESSMENT — PAIN SCALES - GENERAL: PAINLEVEL: NO PAIN (0)

## 2019-09-05 NOTE — NURSING NOTE
"Chief Complaint   Patient presents with     Hives       Initial BP 92/70 (Patient Position: Sitting)   Pulse 76   Wt 69.4 kg (153 lb)   SpO2 95%   BMI 28.91 kg/m   Estimated body mass index is 28.91 kg/m  as calculated from the following:    Height as of 8/14/19: 1.549 m (5' 1\").    Weight as of this encounter: 69.4 kg (153 lb).  Medication Reconciliation: complete  "

## 2019-10-21 ENCOUNTER — MYC MEDICAL ADVICE (OUTPATIENT)
Dept: FAMILY MEDICINE | Facility: OTHER | Age: 59
End: 2019-10-21

## 2019-11-14 DIAGNOSIS — F41.1 GAD (GENERALIZED ANXIETY DISORDER): ICD-10-CM

## 2019-11-14 DIAGNOSIS — L50.1 CHRONIC IDIOPATHIC URTICARIA: ICD-10-CM

## 2019-11-14 RX ORDER — ESCITALOPRAM OXALATE 10 MG/1
10 TABLET ORAL DAILY
Qty: 90 TABLET | Refills: 0 | Status: SHIPPED | OUTPATIENT
Start: 2019-11-14 | End: 2020-02-05

## 2019-11-14 NOTE — TELEPHONE ENCOUNTER
escitalopram (LEXAPRO) 10 MG tablet  Last visit date with prescribing provider: 9-5-2019  Last refill date: 9-5-2019  Quantity: 60, Refills: 3    Dose was increased to 10mg daily on My Chart visit 10-    Rebekah Rodriguez LPN      Next 5 appointments (look out 90 days)    Feb 05, 2020  8:40 AM CST  (Arrive by 8:25 AM)  SHORT with FELIX Davis  [facility] [place] ([facility] [place] ) [address]  [place] [address]  [phone]

## 2019-11-25 ENCOUNTER — OFFICE VISIT (OUTPATIENT)
Dept: CHIROPRACTIC MEDICINE | Facility: OTHER | Age: 59
End: 2019-11-25
Attending: CHIROPRACTOR
Payer: COMMERCIAL

## 2019-11-25 DIAGNOSIS — M99.02 SEGMENTAL AND SOMATIC DYSFUNCTION OF THORACIC REGION: ICD-10-CM

## 2019-11-25 DIAGNOSIS — M99.03 SEGMENTAL AND SOMATIC DYSFUNCTION OF LUMBAR REGION: Primary | ICD-10-CM

## 2019-11-25 DIAGNOSIS — M99.01 SEGMENTAL AND SOMATIC DYSFUNCTION OF CERVICAL REGION: ICD-10-CM

## 2019-11-25 DIAGNOSIS — M54.50 ACUTE BILATERAL LOW BACK PAIN WITHOUT SCIATICA: ICD-10-CM

## 2019-11-25 PROCEDURE — 98941 CHIROPRACT MANJ 3-4 REGIONS: CPT | Mod: AT | Performed by: CHIROPRACTOR

## 2019-11-25 NOTE — PROGRESS NOTES
Subjective Finding:    Chief compalint: Patient presents with:  Back Pain  Neck Pain  , Pain Scale: 5/10, Intensity: sharp, Duration: 3 days, Change since last visit: , Radiating: no.    Date of injury:     Activities that the pain restricts:   Home/household/hobbies/social activities: no.  Work duties: no.  Sleep: no.  Makes symptoms better: rest.  Makes symptoms worse: activity.  Have you seen anyone else for the symptoms? No.  Work related: no.  Automobile related injury: no.    Objective and Assessment:    Posture Analysis:   High shoulder: right.  Head tilt: right.  High iliac crest: .  Head carriage: forward.  Thoracic Kyphosis: neutral.  Lumbar Lordosis: neutral.    Lumbar Range of Motion: extension decreased.  Cervical Range of Motion: .  Thoracic Range of Motion: extension decreased.  Extremity Range of Motion: .    Palpation:   Lev scapulae: stiff, referred pain: no  Lumbar paraspinals  Segmental dysfunction pre-treatment:L5  T34.  C6    Assessment post-treatment:  Cervical: ROM increased.  Thoracic: ROM increased.  Lumbar: ROM increased.    Comments: .  Past left hip replacement      Complicating Factors: .    Plan / Procedure:    Expected release date: .  Treatment plan: PRN.  Instructed patient: ice 20 minutes every other hour as needed.  Short term goals: reduce pain.  Long term goals: restore normal function.  Prognosis: excellent.

## 2019-11-27 ENCOUNTER — OFFICE VISIT (OUTPATIENT)
Dept: CHIROPRACTIC MEDICINE | Facility: OTHER | Age: 59
End: 2019-11-27
Attending: CHIROPRACTOR
Payer: COMMERCIAL

## 2019-11-27 DIAGNOSIS — M99.02 SEGMENTAL AND SOMATIC DYSFUNCTION OF THORACIC REGION: ICD-10-CM

## 2019-11-27 DIAGNOSIS — M54.50 ACUTE BILATERAL LOW BACK PAIN WITHOUT SCIATICA: ICD-10-CM

## 2019-11-27 DIAGNOSIS — M99.03 SEGMENTAL AND SOMATIC DYSFUNCTION OF LUMBAR REGION: Primary | ICD-10-CM

## 2019-11-27 PROCEDURE — 98941 CHIROPRACT MANJ 3-4 REGIONS: CPT | Mod: AT | Performed by: CHIROPRACTOR

## 2019-11-27 NOTE — PROGRESS NOTES
Subjective Finding:    Chief compalint: Patient presents with:  Back Pain: from lifting a board  , Pain Scale: 5/10, Intensity: sharp, Duration: 3 days, Change since last visit: , Radiating: no.    Date of injury:     Activities that the pain restricts:   Home/household/hobbies/social activities: no.  Work duties: no.  Sleep: no.  Makes symptoms better: rest.  Makes symptoms worse: activity.  Have you seen anyone else for the symptoms? No.  Work related: no.  Automobile related injury: no.    Objective and Assessment:    Posture Analysis:   High shoulder: right.  Head tilt: right.  High iliac crest: .  Head carriage: forward.  Thoracic Kyphosis: neutral.  Lumbar Lordosis: neutral.    Lumbar Range of Motion: extension decreased.  Cervical Range of Motion: .  Thoracic Range of Motion: extension decreased.  Extremity Range of Motion: .    Palpation:   Lev scapulae: stiff, referred pain: no  Lumbar paraspinals  Segmental dysfunction pre-treatment:L5  T34.  C6    Assessment post-treatment:  Cervical: ROM increased.  Thoracic: ROM increased.  Lumbar: ROM increased.    Comments: .  Past left hip replacement      Complicating Factors: .    Plan / Procedure:    Expected release date: .  Treatment plan: PRN.  Instructed patient: ice 20 minutes every other hour as needed.  Short term goals: reduce pain.  Long term goals: restore normal function.  Prognosis: excellent.

## 2019-12-26 ENCOUNTER — TELEPHONE (OUTPATIENT)
Dept: FAMILY MEDICINE | Facility: OTHER | Age: 59
End: 2019-12-26

## 2019-12-26 NOTE — TELEPHONE ENCOUNTER
Spoke with patent has had a hysterectomy in her 40's not 20's and was for fibroids.  No cervix is present. Only ovaries. We can make changes to her HM.

## 2020-01-29 NOTE — PROGRESS NOTES
Subjective     Estella ORO Aas is a 59 year old female who presents to clinic today for the following health issues:    HPI   Anxiety Follow-Up    How are you doing with your anxiety since your last visit? Improved     Are you having other symptoms that might be associated with anxiety? Yes:  fatigue    Have you had a significant life event? OTHER: broke with up with SO     Are you feeling depressed? Yes:  feeling down    Do you have any concerns with your use of alcohol or other drugs? No    Social History     Tobacco Use     Smoking status: Current Every Day Smoker     Packs/day: 0.50     Types: Cigarettes     Last attempt to quit: 10/1/2017     Years since quittin.3     Smokeless tobacco: Never Used     Tobacco comment: Quit ; no passive exposure   Substance Use Topics     Alcohol use: Yes     Comment: Socially     Drug use: No     RADHA-7 SCORE 2017   Total Score 0 4     PHQ 2017   PHQ-9 Total Score 4 6   Q9: Thoughts of better off dead/self-harm past 2 weeks Not at all Not at all     Last PHQ-9 2019   1.  Little interest or pleasure in doing things 1   2.  Feeling down, depressed, or hopeless 0   3.  Trouble falling or staying asleep, or sleeping too much 2   4.  Feeling tired or having little energy 2   5.  Poor appetite or overeating 0   6.  Feeling bad about yourself 0   7.  Trouble concentrating 1   8.  Moving slowly or restless 0   Q9: Thoughts of better off dead/self-harm past 2 weeks 0   PHQ-9 Total Score 6   Difficulty at work, home, or with people Not difficult at all     RADHA-7  2019   1. Feeling nervous, anxious, or on edge 1   2. Not being able to stop or control worrying 1   3. Worrying too much about different things 1   4. Trouble relaxing 1   5. Being so restless that it is hard to sit still 0   6. Becoming easily annoyed or irritable 0   7. Feeling afraid, as if something awful might happen 0   RADHA-7 Total Score 4   If you checked any problems, how  "difficult have they made it for you to do your work, take care of things at home, or get along with other people? -           Patient Active Problem List   Diagnosis     Advanced directives, counseling/discussion     RADHA (generalized anxiety disorder)     Chronic idiopathic urticaria     Past Surgical History:   Procedure Laterality Date     AS TOTAL HIP ARTHROPLASTY Left 2017     basilar joint injection, left thumb  2017     C TOTAL HIP ARTHROPLASTY Left 2017     COLONOSCOPY  2006     hand      RT     HYSTERECTOMY TOTAL ABDOMINAL N/A     Cervix removed. Ovaries remain.        Social History     Tobacco Use     Smoking status: Current Every Day Smoker     Packs/day: 0.50     Types: Cigarettes     Last attempt to quit: 10/1/2017     Years since quittin.3     Smokeless tobacco: Never Used     Tobacco comment: Quit ; no passive exposure   Substance Use Topics     Alcohol use: Yes     Comment: Socially     Family History   Problem Relation Age of Onset     Other - See Comments Father         Alzheimer's Disease     C.A.D. Father      Hypertension Mother          Current Outpatient Medications   Medication Sig Dispense Refill     cholecalciferol (VITAMIN D3) 5000 UNITS TABS tablet Take 10,000 Units by mouth daily       escitalopram (LEXAPRO) 10 MG tablet Take 1 tablet (10 mg) by mouth daily 90 tablet 3     Allergies   Allergen Reactions     Cats      \"Cat/feline product derivatives\"     Recent Labs   Lab Test 19  1220 17  1413 16  0848  14  1446 13  0832   *  --   --   --   --  98   HDL 72  --   --   --   --  61*   TRIG 127  --   --   --   --  81   ALT 17 20 18  --   --   --    CR 0.65 0.64 0.71   < >  --   --    GFRESTIMATED >90 >90  Non  GFR Calc   85   < >  --   --    GFRESTBLACK >90 >90   GFR Calc   >90   GFR Calc     < >  --   --    POTASSIUM 3.8 4.0 4.1   < >  --   --    TSH  --   --   --   " "--  1.32  --     < > = values in this interval not displayed.      BP Readings from Last 3 Encounters:   02/05/20 102/68   09/05/19 92/70   08/14/19 118/70    Wt Readings from Last 3 Encounters:   02/05/20 68 kg (150 lb)   09/05/19 69.4 kg (153 lb)   08/14/19 69.4 kg (153 lb)                      Reviewed and updated as needed this visit by Provider         Review of Systems   ROS COMP: Constitutional, HEENT, cardiovascular, pulmonary, gi and gu systems are negative, except as otherwise noted.      Objective    /68   Pulse 71   Temp 97  F (36.1  C) (Tympanic)   Resp 18   Ht 1.549 m (5' 1\")   Wt 68 kg (150 lb)   SpO2 99%   BMI 28.34 kg/m    Body mass index is 28.34 kg/m .  Physical Exam   GENERAL: healthy, alert and no distress  CV: regular rate and rhythm, normal S1 S2, no S3 or S4, no murmur, click or rub, no peripheral edema and peripheral pulses strong  PSYCH: mentation appears normal, affect normal/bright  PSYCH: stressful situation and not sleeping. Discussion on this.     Diagnostic Test Results:  Labs reviewed in Epic  No results found for this or any previous visit (from the past 24 hour(s)).        Assessment & Plan     1. Chronic idiopathic urticaria  Cured her urticaria.   - escitalopram (LEXAPRO) 10 MG tablet; Take 1 tablet (10 mg) by mouth daily  Dispense: 90 tablet; Refill: 3    2. RADHA (generalized anxiety disorder)  She is not sleeping discussion on her patterns and her need for sleep. CBD try for sleep.     - escitalopram (LEXAPRO) 10 MG tablet; Take 1 tablet (10 mg) by mouth daily  Dispense: 90 tablet; Refill: 3     Tobacco Cessation:   reports that she has been smoking cigarettes. She has been smoking about 0.50 packs per day. She has never used smokeless tobacco.  Tobacco Cessation Action Plan: Information offered: Patient not interested at this time    Referred for colon screening.     See Patient Instructions    No follow-ups on file.    FELIX Joe  [facility] [place]

## 2020-02-05 ENCOUNTER — OFFICE VISIT (OUTPATIENT)
Dept: FAMILY MEDICINE | Facility: OTHER | Age: 60
End: 2020-02-05
Attending: PHYSICIAN ASSISTANT
Payer: COMMERCIAL

## 2020-02-05 VITALS
TEMPERATURE: 97 F | HEIGHT: 61 IN | DIASTOLIC BLOOD PRESSURE: 68 MMHG | BODY MASS INDEX: 28.32 KG/M2 | SYSTOLIC BLOOD PRESSURE: 102 MMHG | WEIGHT: 150 LBS | OXYGEN SATURATION: 99 % | RESPIRATION RATE: 18 BRPM | HEART RATE: 71 BPM

## 2020-02-05 DIAGNOSIS — F41.1 GAD (GENERALIZED ANXIETY DISORDER): ICD-10-CM

## 2020-02-05 DIAGNOSIS — L50.1 CHRONIC IDIOPATHIC URTICARIA: ICD-10-CM

## 2020-02-05 DIAGNOSIS — Z12.11 SPECIAL SCREENING FOR MALIGNANT NEOPLASMS, COLON: Primary | ICD-10-CM

## 2020-02-05 PROCEDURE — 99213 OFFICE O/P EST LOW 20 MIN: CPT | Performed by: PHYSICIAN ASSISTANT

## 2020-02-05 RX ORDER — ESCITALOPRAM OXALATE 10 MG/1
10 TABLET ORAL DAILY
Qty: 90 TABLET | Refills: 3 | Status: SHIPPED | OUTPATIENT
Start: 2020-02-05 | End: 2020-11-20

## 2020-02-05 ASSESSMENT — MIFFLIN-ST. JEOR: SCORE: 1187.78

## 2020-02-05 ASSESSMENT — PAIN SCALES - GENERAL: PAINLEVEL: NO PAIN (0)

## 2020-02-05 NOTE — PATIENT INSTRUCTIONS
Thank you for choosing [facility].   I have office hours 8:00 am to 4:30 pm on Monday's, Wednesday's, Thursday's and Friday's. My nurse and I are out of the office every Tuesday.    Following your visit, when your labs and diagnostic testing have returned, I will review then and you will be contacted by my nurse.  If you are on My Chart, you can also view results there.    For refills, notify your pharmacy regarding what you need and the pharmacy will generate a refill request. Do not call my nurse as she is unable to process refill request. Please plan ahead and allow 3-5 days for refill requests.    You will generally receive a reminder call the day prior to your appointment.  If you cannot attend your appointment, please cancel your appointment with as much notice as possible.  If there is a pattern of failure to present for your appointments, I cannot provide consistent, meaningful, ongoing care for you. It is very important to me that you come in for your care, so we can best assist you with your health care needs.    IMPORTANT:  Please note that it is my standard of practice to NOT participate in prescribing ongoing requested Narcotic Analgesic therapy, and/or participate in the prescribing of other controlled substances.  My nurse and I am happy to assist you with the process of referral for alternative pain management as needed, and other treatment modalities including but not limited to:  Physical Therapy, Physical Medicine and Rehab, Counseling, Chiropractic Care, Orthopedic Care, and non-narcotic medication management.     In the event that you need to be seen for emergent concerns and I am out of office,  please see one of my colleagues for acute concerns.  You may also present to  or ER.  I appreciate the opportunity to serve you and look forward to supporting your healthcare needs in the future. Please contact me with any questions or concerns that you may  have.    Sincerely,      Mia Villagomez RN, PA-C

## 2020-02-05 NOTE — NURSING NOTE
"Chief Complaint   Patient presents with     Anxiety       Initial /68   Pulse 71   Temp 97  F (36.1  C) (Tympanic)   Resp 18   Ht 1.549 m (5' 1\")   Wt 68 kg (150 lb)   SpO2 99%   BMI 28.34 kg/m   Estimated body mass index is 28.34 kg/m  as calculated from the following:    Height as of this encounter: 1.549 m (5' 1\").    Weight as of this encounter: 68 kg (150 lb).  Medication Reconciliation: complete  Christina Oleary LPN  "

## 2020-02-26 ENCOUNTER — TELEPHONE (OUTPATIENT)
Dept: SURGERY | Facility: OTHER | Age: 60
End: 2020-02-26

## 2020-02-26 ENCOUNTER — PREP FOR PROCEDURE (OUTPATIENT)
Dept: SURGERY | Facility: OTHER | Age: 60
End: 2020-02-26

## 2020-02-26 DIAGNOSIS — Z12.11 SPECIAL SCREENING FOR MALIGNANT NEOPLASMS, COLON: Primary | ICD-10-CM

## 2020-02-26 DIAGNOSIS — Z12.11 SPECIAL SCREENING FOR MALIGNANT NEOPLASMS, COLON: ICD-10-CM

## 2020-02-26 DIAGNOSIS — K51.414: Primary | ICD-10-CM

## 2020-02-26 RX ORDER — SODIUM, POTASSIUM,MAG SULFATES 17.5-3.13G
SOLUTION, RECONSTITUTED, ORAL ORAL
Qty: 2 BOTTLE | Refills: 0 | Status: ON HOLD | OUTPATIENT
Start: 2020-02-26 | End: 2020-06-18

## 2020-02-26 NOTE — TELEPHONE ENCOUNTER
Referral received for colonoscopy.   This patient was  approved by surgery education nurses for meet and greet colonoscopy and will not need a preop or consult appointment.   Patient scheduled for colonoscopy on March 9, 2020 with Dr. Jauregui at [facility] with suprep bowel prep.   Instructions given via phone and instructions mailed to patient with surgery handbook.    Orders done.    Rossy Curtis LPN      Please sign RX for colon prep in this encounter.

## 2020-02-26 NOTE — LETTER
"February 26, 2020          Estella ORO Aas  [address] [place] [address]        Dear Estella,     We want your Colonoscopy to be as pleasant as possible. Please review the instructions below. If you have any questions, please contact us at any of the following numbers:     [facility] Health Unit Coordinator: [phone]  Clinic Nurse ([place]): [phone]  Surgery Education Nurse: [phone]    Date of Procedure: Monday March 9, 2020 with Dr. Jauregui  Admit time: Hospital Surgery will call you the day before your procedure by 5pm with your arrival time. If your surgery is on Monday, expect a call on Friday.  If you are not contacted by 5 pm you may call admitting at [phone].   After hours or on weekends, call [phone] to postpone.   Call the clinic nurse if you become ill within 1 week of your procedure to reschedule.   NO Preop appointment needed within the 30 days prior to your procedure.     7 DAYS BEFORE THE EXAM:   prescriptions at your pharmacy as soon as possible. (call ahead if more than 1 week)  Call the Surgery Education Nurse at [phone] and have a medication list ready.   Do not take Aspirin or NSAIDS (Ibuprofen, Celebrex, Naproxen, etc) 7 days before surgery.   Stop fiber supplements, vitamins, iron, and herbals. Do not eat any corn, nuts or seeds.   You may continue your 81 mg Aspirin.If you are prescribed blood thinners or insulin, talk to your primary care provider instructions on these medications.    2 DAYS BEFORE THE EXAM:   Low fiber diet. See the list of low fiber foods on page 3 of the \"Split-Dose SuPrep\" packet.   Drink 4-6 large glasses of sports drink today and tomorrow. Avoid red and purple.        1 DAY BEFORE THE EXAM:  No solid food or milk products after 12:01am. Drink only clear liquids all day.   See list of clear liquids on page 2 of \"Split-Dose SuPrep\" packet. No red, purple or alcohol.          AT 6:00 PM THE EVENING PRIOR TO PROCEDURE:  Pour one bottle of " SuPrep liquid into the mixing container. Add cool water to the 16 oz line, mix and drink. Follow with two 16 oz. glasses of water in the next hour. Stay near a toilet.    DAY OF COLONOSCOPY:          6 HOURS PRIOR TO THE EXAM (set an alarm):  Repeat the previous instructions with the 2nd bottle of SuPrep followed by 2 glasses of water.   Continue clear liquids until 2 hours prior to exam. If you must take medication, take it with a sip of water.  Do not take diabetes medicine by mouth until after your exam. If you have asthma, bring your inhaler to surgery.  Shower before arrival and wear clean, comfortable clothes.   No jewelry, make-up, nail polish, hairspray, lotions, or perfumes.   [place] in Admitting through the [place] Entrance.  You must have a responsible adult to drive and stay with you for 4 hours at home or you will be cancelled.     TIPS FOR COLON CLEANSING BEFORE YOUR COLONOSCOPY  To get accurate results from your exam, your colon must be completely empty or you may need to repeat the colon prep and exam. If you followed instructions and your stool is clear or yellow liquid, you are ready. If you are not sure if your colon is clean, please call the clinic nurse.     You may use tucks wipes, hemorrhoid treatments, hydrocortisone cream, or alcohol-free baby wipes to ease anal irritation. You may also use Vaseline to help protect the skin.     You will have loose watery stools and may also have chills. Expect to feel discomfort, bloating and nausea until the stool clears from your colon. Dress for comfort.     If SuPrep is not covered by insurance and you would like an alternate prep, you or your pharmacy may call the nurse to request a new prescription. The dietary instructions are the same for both preps. Take Dulcolax 5mg at bedtime 2 nights before procedure and 3pm day before exam. Drink 1/2 of the the Golytely at 6pm day before exam 1  8 oz glass every 15 minutes. Repeat with 2nd 1/2 of Golytely 6  hours prior to exam.        Sincerely,        Bradly Jauregui MD

## 2020-03-02 ENCOUNTER — HEALTH MAINTENANCE LETTER (OUTPATIENT)
Age: 60
End: 2020-03-02

## 2020-03-02 ENCOUNTER — ANESTHESIA EVENT (OUTPATIENT)
Dept: SURGERY | Facility: HOSPITAL | Age: 60
End: 2020-03-02
Payer: COMMERCIAL

## 2020-03-02 ASSESSMENT — LIFESTYLE VARIABLES: TOBACCO_USE: 1

## 2020-03-02 NOTE — ANESTHESIA PREPROCEDURE EVALUATION
Anesthesia Pre-Procedure Evaluation    Patient: Estella ORO Aas   MRN: [id number] : 1960          Preoperative Diagnosis: Special screening for malignant neoplasms, colon [Z12.11]    Procedure(s):  COLONOSCOPY    Past Medical History:   Diagnosis Date     Depressive disorder, not elsewhere classified 2008     Dysthymic disorder 2008     Past Surgical History:   Procedure Laterality Date     AS TOTAL HIP ARTHROPLASTY Left 2017     basilar joint injection, left thumb  2017     C TOTAL HIP ARTHROPLASTY Left 2017     COLONOSCOPY  2006     hand      RT     HYSTERECTOMY TOTAL ABDOMINAL N/A     Cervix removed. Ovaries remain.        Anesthesia Evaluation     . Pt has had prior anesthetic. Type: MAC and General    No history of anesthetic complications          ROS/MED HX    ENT/Pulmonary:     (+)tobacco use, Current use 1 packs/day  , . .    Neurologic:  - neg neurologic ROS     Cardiovascular:  - neg cardiovascular ROS   (+) ----. : . . . :. . No previous cardiac testing       METS/Exercise Tolerance:  >4 METS   Hematologic:  - neg hematologic  ROS       Musculoskeletal:  - neg musculoskeletal ROS       GI/Hepatic:     (+) bowel prep,       Renal/Genitourinary:  - ROS Renal section negative       Endo:  - neg endo ROS       Psychiatric:     (+) psychiatric history anxiety and depression      Infectious Disease:  - neg infectious disease ROS       Malignancy:      - no malignancy   Other:    - neg other ROS                      Physical Exam  Normal systems: cardiovascular, pulmonary and dental    Airway   Mallampati: II  TM distance: >3 FB  Neck ROM: full  Comment: Small mouth opening     Dental     Cardiovascular   Rhythm and rate: regular and normal      Pulmonary    breath sounds clear to auscultation            Lab Results   Component Value Date    WBC 7.4 2019    HGB 14.3 2019    HCT 41.4 2019     2019    CRP <2.9 2016    SED 9  "06/12/2019     06/12/2019    POTASSIUM 3.8 06/12/2019    CHLORIDE 106 06/12/2019    CO2 28 06/12/2019    BUN 14 06/12/2019    CR 0.65 06/12/2019    GLC 91 06/12/2019    RUTHANN 9.2 06/12/2019    ALBUMIN 4.2 06/12/2019    PROTTOTAL 7.7 06/12/2019    ALT 17 06/12/2019    AST 14 06/12/2019    ALKPHOS 79 06/12/2019    BILITOTAL 0.3 06/12/2019    TSH 1.32 07/21/2014       Preop Vitals  BP Readings from Last 3 Encounters:   02/05/20 102/68   09/05/19 92/70   08/14/19 118/70    Pulse Readings from Last 3 Encounters:   02/05/20 71   09/05/19 76   08/14/19 68      Resp Readings from Last 3 Encounters:   02/05/20 18   06/16/19 16   12/14/17 16    SpO2 Readings from Last 3 Encounters:   02/05/20 99%   09/05/19 95%   08/14/19 98%      Temp Readings from Last 1 Encounters:   02/05/20 97  F (36.1  C) (Tympanic)    Ht Readings from Last 1 Encounters:   02/05/20 1.549 m (5' 1\")      Wt Readings from Last 1 Encounters:   02/05/20 68 kg (150 lb)    Estimated body mass index is 28.34 kg/m  as calculated from the following:    Height as of 2/5/20: 1.549 m (5' 1\").    Weight as of 2/5/20: 68 kg (150 lb).       Anesthesia Plan      History & Physical Review  History and physical reviewed and following examination; no interval change.    ASA Status:  2 .        Plan for MAC with Intravenous and Propofol induction. Maintenance will be TIVA.  Reason for MAC:  Deep or markedly invasive procedure (G8) and Extreme anxiety (QS)    Risks and benefits of MAC anesthetic discussed including dental damage, aspiration, loss of airway, conversion to general anesthetic, CV complications, MI, stroke, death. Pt wishes to proceed.         Postoperative Care      Consents  Anesthetic plan, risks, benefits and alternatives discussed with:  Patient..                 POONAM Muller CRNA  "

## 2020-03-04 ENCOUNTER — TELEPHONE (OUTPATIENT)
Dept: SURGERY | Facility: OTHER | Age: 60
End: 2020-03-04

## 2020-03-04 NOTE — TELEPHONE ENCOUNTER
"This patient was scheduled for colonoscopy with Dr. Jauregui 3/9/2020. She has a \"stomach flu\" and is requesting to reschedule to 3/23/2020. OR notified via inbasket message.   "

## 2020-03-09 ENCOUNTER — ANESTHESIA (OUTPATIENT)
Dept: SURGERY | Facility: HOSPITAL | Age: 60
End: 2020-03-09
Payer: COMMERCIAL

## 2020-03-16 ENCOUNTER — TELEPHONE (OUTPATIENT)
Dept: SURGERY | Facility: OTHER | Age: 60
End: 2020-03-16

## 2020-03-16 NOTE — TELEPHONE ENCOUNTER
Pt called and states that she would like to reschedule her colonoscopy.  She was on for 3/23 and is now on for 5/4/20.  Surgery was notified.

## 2020-04-27 ENCOUNTER — TELEPHONE (OUTPATIENT)
Dept: SURGERY | Facility: OTHER | Age: 60
End: 2020-04-27

## 2020-04-27 NOTE — LETTER
"April 27, 2020          Estella ORO Aas  [address]  [place] [address]        Dear Estella,     We want your Colonoscopy to be as pleasant as possible. Please review the instructions below. If you have any questions, please contact us at any of the following numbers:     [facility] Health Unit Coordinator: [phone]  Clinic Nurse ([place]): [phone]  Surgery Education Nurse: [phone]    Date of Procedure: Monday Nenita 15, 2020 with Dr. Jauregui  Admit time: Hospital Surgery will call you the day before your procedure by 5pm with your arrival time. If your surgery is on Monday, expect a call on Friday.  If you are not contacted by 5 pm you may call admitting at [phone].   After hours or on weekends, call [phone] to postpone.   Call the clinic nurse if you become ill within 1 week of your procedure to reschedule.   NO Preop appointment needed within the 30 days prior to your procedure.     7 DAYS BEFORE THE EXAM: 6/8/2020   prescriptions at your pharmacy as soon as possible. (call ahead if more than 1 week)  Call the Surgery Education Nurse at [phone] and have a medication list ready.   Do not take Aspirin or NSAIDS (Ibuprofen, Celebrex, Naproxen, etc) 7 days before surgery.   Stop fiber supplements, vitamins, iron, and herbals. Do not eat any corn, nuts or seeds.   You may continue your 81 mg Aspirin.If you are prescribed blood thinners or insulin, talk to your primary care provider instructions on these medications.    2 DAYS BEFORE THE EXAM: 6/13/2020  Low fiber diet. See the list of low fiber foods on page 3 of the \"Split-Dose SuPrep\" packet.   Drink 4-6 large glasses of sports drink today and tomorrow. Avoid red and purple.    1 DAY BEFORE THE EXAM: 6/14/2020  No solid food or milk products after 12:01am. Drink only clear liquids all day.   See list of clear liquids on page 2 of \"Split-Dose SuPrep\" packet. No red, purple or alcohol.          AT 6:00 PM THE EVENING PRIOR TO " PROCEDURE:  Pour one bottle of SuPrep liquid into the mixing container. Add cool water to the 16 oz line, mix and drink. Follow with two 16 oz. glasses of water in the next hour. Stay near a toilet.    DAY OF COLONOSCOPY: 6/15/2020          6 HOURS PRIOR TO THE EXAM (set an alarm):  Repeat the previous instructions with the 2nd bottle of SuPrep followed by 2 glasses of water.   Continue clear liquids until 2 hours prior to exam. If you must take medication, take it with a sip of water.  Do not take diabetes medicine by mouth until after your exam. If you have asthma, bring your inhaler to surgery.  Shower before arrival and wear clean, comfortable clothes.   No jewelry, make-up, nail polish, hairspray, lotions, or perfumes.   [place] in Admitting through the [place] Entrance.  You must have a responsible adult to drive and stay with you for 4 hours at home or you will be cancelled.     TIPS FOR COLON CLEANSING BEFORE YOUR COLONOSCOPY  To get accurate results from your exam, your colon must be completely empty or you may need to repeat the colon prep and exam. If you followed instructions and your stool is clear or yellow liquid, you are ready. If you are not sure if your colon is clean, please call the clinic nurse.     You may use tucks wipes, hemorrhoid treatments, hydrocortisone cream, or alcohol-free baby wipes to ease anal irritation. You may also use Vaseline to help protect the skin.     You will have loose watery stools and may also have chills. Expect to feel discomfort, bloating and nausea until the stool clears from your colon. Dress for comfort.     If SuPrep is not covered by insurance and you would like an alternate prep, you or your pharmacy may call the nurse to request a new prescription. The dietary instructions are the same for both preps. Take Dulcolax 5mg at bedtime 2 nights before procedure and 3pm day before exam. Drink 1/2 of the the Golytely at 6pm day before exam 1  8 oz glass every 15  minutes. Repeat with 2nd 1/2 of Golytely 6 hours prior to exam.        Sincerely,        Bradly Jauregui MD/[place] BONILLA LPN

## 2020-04-27 NOTE — TELEPHONE ENCOUNTER
Scheduled for colonoscopy 5/4 with Dr. Jauregui. Rescheduled to 6/15 due to covid 19 pandemic. Letter with adjusted dates sent. Sadaf in surgery notified of date change.

## 2020-05-28 ENCOUNTER — TELEPHONE (OUTPATIENT)
Dept: SURGERY | Facility: OTHER | Age: 60
End: 2020-05-28

## 2020-05-28 NOTE — TELEPHONE ENCOUNTER
Patient was scheduled for colonoscopy 6/15 with Dr. Jauregui. Scopes are now done on Thursdays. Contacted patient and rescheduled to 6/18. COVID test scheduled 6/15. OR notfied.

## 2020-06-15 ENCOUNTER — OFFICE VISIT (OUTPATIENT)
Dept: FAMILY MEDICINE | Facility: OTHER | Age: 60
End: 2020-06-15
Attending: PHYSICIAN ASSISTANT
Payer: COMMERCIAL

## 2020-06-15 DIAGNOSIS — Z01.818 PREOP GENERAL PHYSICAL EXAM: Primary | ICD-10-CM

## 2020-06-15 PROCEDURE — 99000 SPECIMEN HANDLING OFFICE-LAB: CPT | Performed by: SURGERY

## 2020-06-15 PROCEDURE — U0003 INFECTIOUS AGENT DETECTION BY NUCLEIC ACID (DNA OR RNA); SEVERE ACUTE RESPIRATORY SYNDROME CORONAVIRUS 2 (SARS-COV-2) (CORONAVIRUS DISEASE [COVID-19]), AMPLIFIED PROBE TECHNIQUE, MAKING USE OF HIGH THROUGHPUT TECHNOLOGIES AS DESCRIBED BY CMS-2020-01-R: HCPCS | Mod: 90 | Performed by: SURGERY

## 2020-06-16 LAB
SARS-COV-2 RNA SPEC QL NAA+PROBE: NOT DETECTED
SPECIMEN SOURCE: NORMAL

## 2020-06-18 ENCOUNTER — HOSPITAL ENCOUNTER (OUTPATIENT)
Facility: HOSPITAL | Age: 60
Discharge: HOME | End: 2020-06-18
Attending: SURGERY | Admitting: SURGERY
Payer: COMMERCIAL

## 2020-06-18 VITALS
WEIGHT: 147 LBS | BODY MASS INDEX: 27.75 KG/M2 | OXYGEN SATURATION: 95 % | DIASTOLIC BLOOD PRESSURE: 71 MMHG | SYSTOLIC BLOOD PRESSURE: 101 MMHG | RESPIRATION RATE: 16 BRPM | HEART RATE: 65 BPM | TEMPERATURE: 97.8 F | HEIGHT: 61 IN

## 2020-06-18 DIAGNOSIS — Z12.11 SPECIAL SCREENING FOR MALIGNANT NEOPLASMS, COLON: ICD-10-CM

## 2020-06-18 PROCEDURE — 25000125 ZZHC RX 250: Performed by: NURSE ANESTHETIST, CERTIFIED REGISTERED

## 2020-06-18 PROCEDURE — 36000050 ZZH SURGERY LEVEL 2 1ST 30 MIN: Performed by: SURGERY

## 2020-06-18 PROCEDURE — G0121 COLON CA SCRN NOT HI RSK IND: HCPCS | Performed by: SURGERY

## 2020-06-18 PROCEDURE — 45378 DIAGNOSTIC COLONOSCOPY: CPT | Performed by: NURSE ANESTHETIST, CERTIFIED REGISTERED

## 2020-06-18 PROCEDURE — 40000305 ZZH STATISTIC PRE PROC ASSESS I: Performed by: SURGERY

## 2020-06-18 PROCEDURE — 25000128 H RX IP 250 OP 636: Performed by: NURSE ANESTHETIST, CERTIFIED REGISTERED

## 2020-06-18 PROCEDURE — 37000008 ZZH ANESTHESIA TECHNICAL FEE, 1ST 30 MIN: Performed by: SURGERY

## 2020-06-18 PROCEDURE — 71000027 ZZH RECOVERY PHASE 2 EACH 15 MINS: Performed by: SURGERY

## 2020-06-18 RX ORDER — ONDANSETRON 4 MG/1
4 TABLET, ORALLY DISINTEGRATING ORAL EVERY 30 MIN PRN
Status: DISCONTINUED | OUTPATIENT
Start: 2020-06-18 | End: 2020-06-18 | Stop reason: HOSPADM

## 2020-06-18 RX ORDER — NALOXONE HYDROCHLORIDE 0.4 MG/ML
.1-.4 INJECTION, SOLUTION INTRAMUSCULAR; INTRAVENOUS; SUBCUTANEOUS
Status: DISCONTINUED | OUTPATIENT
Start: 2020-06-18 | End: 2020-06-18 | Stop reason: HOSPADM

## 2020-06-18 RX ORDER — MEPERIDINE HYDROCHLORIDE 25 MG/ML
12.5 INJECTION INTRAMUSCULAR; INTRAVENOUS; SUBCUTANEOUS
Status: DISCONTINUED | OUTPATIENT
Start: 2020-06-18 | End: 2020-06-18 | Stop reason: HOSPADM

## 2020-06-18 RX ORDER — SODIUM CHLORIDE, SODIUM LACTATE, POTASSIUM CHLORIDE, CALCIUM CHLORIDE 600; 310; 30; 20 MG/100ML; MG/100ML; MG/100ML; MG/100ML
INJECTION, SOLUTION INTRAVENOUS CONTINUOUS
Status: DISCONTINUED | OUTPATIENT
Start: 2020-06-18 | End: 2020-06-18 | Stop reason: HOSPADM

## 2020-06-18 RX ORDER — LIDOCAINE 40 MG/G
CREAM TOPICAL
Status: DISCONTINUED | OUTPATIENT
Start: 2020-06-18 | End: 2020-06-18 | Stop reason: HOSPADM

## 2020-06-18 RX ORDER — ONDANSETRON 2 MG/ML
4 INJECTION INTRAMUSCULAR; INTRAVENOUS EVERY 30 MIN PRN
Status: DISCONTINUED | OUTPATIENT
Start: 2020-06-18 | End: 2020-06-18 | Stop reason: HOSPADM

## 2020-06-18 RX ORDER — FLUMAZENIL 0.1 MG/ML
0.2 INJECTION, SOLUTION INTRAVENOUS
Status: DISCONTINUED | OUTPATIENT
Start: 2020-06-18 | End: 2020-06-18 | Stop reason: HOSPADM

## 2020-06-18 RX ORDER — PROPOFOL 10 MG/ML
INJECTION, EMULSION INTRAVENOUS PRN
Status: DISCONTINUED | OUTPATIENT
Start: 2020-06-18 | End: 2020-06-18

## 2020-06-18 RX ORDER — LIDOCAINE HYDROCHLORIDE 20 MG/ML
INJECTION, SOLUTION INFILTRATION; PERINEURAL PRN
Status: DISCONTINUED | OUTPATIENT
Start: 2020-06-18 | End: 2020-06-18

## 2020-06-18 RX ADMIN — PROPOFOL 50 MG: 10 INJECTION, EMULSION INTRAVENOUS at 15:26

## 2020-06-18 RX ADMIN — PROPOFOL 20 MG: 10 INJECTION, EMULSION INTRAVENOUS at 15:15

## 2020-06-18 RX ADMIN — PROPOFOL 30 MG: 10 INJECTION, EMULSION INTRAVENOUS at 15:10

## 2020-06-18 RX ADMIN — PROPOFOL 50 MG: 10 INJECTION, EMULSION INTRAVENOUS at 15:18

## 2020-06-18 RX ADMIN — PROPOFOL 20 MG: 10 INJECTION, EMULSION INTRAVENOUS at 15:29

## 2020-06-18 RX ADMIN — LIDOCAINE HYDROCHLORIDE 40 MG: 20 INJECTION, SOLUTION INFILTRATION; PERINEURAL at 15:10

## 2020-06-18 RX ADMIN — PROPOFOL 50 MG: 10 INJECTION, EMULSION INTRAVENOUS at 15:22

## 2020-06-18 ASSESSMENT — MIFFLIN-ST. JEOR: SCORE: 1178.13

## 2020-06-18 NOTE — ANESTHESIA CARE TRANSFER NOTE
Patient: Estella ORO Aas    Procedure(s):  COLONOSCOPY    Diagnosis: Special screening for malignant neoplasms, colon [Z12.11]  Diagnosis Additional Information: No value filed.    Anesthesia Type:   MAC     Note:  Airway :Nasal Cannula  Patient transferred to:Phase II  Handoff Report: Identifed the Patient, Identified the Reponsible Provider, Reviewed the pertinent medical history, Discussed the surgical course, Reviewed Intra-OP anesthesia mangement and issues during anesthesia, Set expectations for post-procedure period and Allowed opportunity for questions and acknowledgement of understanding      Vitals: (Last set prior to Anesthesia Care Transfer)    CRNA VITALS  6/18/2020 1501 - 6/18/2020 1534      6/18/2020             Pulse:  55    Ht Rate:  59    SpO2:  99 %    Resp Rate (set):  8                Electronically Signed By: POONAM Rodriguez CRNA  June 18, 2020  3:34 PM

## 2020-06-18 NOTE — H&P
"Surgery Consult Clinic Note      RE: Estella ORO Aas  : 1960        Chief Complaint:  Colon cancer screening    History of Present Illness:  I am seeing Estella ORO Aas at the request of Mia WASHINGTON for evaluation regarding meet and greet screening colonoscopy.  She reports intermittent diarrhea recently; 2-3 days per week.  She reports the diarrhea can be \"explosive\" at times.  Denies nausea associated with the diarrhea.  She reports it happens after eating, but has not identified any specific food triggers.  She had a colonoscopy in  which she reports as normal.  She denies family history of colon or rectal cancer, blood in stool, weight loss, abdominal pain.  Previous abdominal surgeries include hysterectomy.   She has no questions regarding  bowel prep.  Reports passing clear liquid stools today.     She specifically denies fevers, chills, nausea, vomiting, chest pain, shortness of breath, palpitations, sore throat, cough, or generalized feeling ill.  She had a negative COVID 19 PCR test on 6/15/2020.    Medical history:  Past Medical History:   Diagnosis Date     Depressive disorder, not elsewhere classified 2008     Dysthymic disorder 2008       Surgical history:  Past Surgical History:   Procedure Laterality Date     AS TOTAL HIP ARTHROPLASTY Left 2017     basilar joint injection, left thumb  2017     C TOTAL HIP ARTHROPLASTY Left 2017     COLONOSCOPY  2006     hand      RT     HYSTERECTOMY TOTAL ABDOMINAL N/A     Cervix removed. Ovaries remain.        Family history:  Family History   Problem Relation Age of Onset     Other - See Comments Father         Alzheimer's Disease     C.A.D. Father      Hypertension Mother        Medications:  Prior to Admission medications    Medication Sig Start Date End Date Taking? Authorizing Provider   cholecalciferol (VITAMIN D3) 5000 UNITS TABS tablet Take 10,000 Units by mouth daily    Reported, Patient   escitalopram " "(LEXAPRO) 10 MG tablet Take 1 tablet (10 mg) by mouth daily 20   Mia Villagomez PA   Na Sulfate-K Sulfate-Mg Sulf (SUPREP BOWEL PREP KIT) solution Drink 1 bottle 6PM prior to procedure followed by 2 16 oz glasses water over next hour. Repeat with 2nd bottle 6 hours prior to procedure 20   Bradly Jauregui MD       Allergies:  The patient is allergic to cats.  .  Social history:  Social History     Tobacco Use     Smoking status: Current Every Day Smoker     Packs/day: 0.50     Types: Cigarettes     Last attempt to quit: 10/1/2017     Years since quittin.7     Smokeless tobacco: Never Used     Tobacco comment: Quit ; no passive exposure   Substance Use Topics     Alcohol use: Yes     Comment: Socially     Marital status: single.    Review of Systems:    Constitutional: Negative for fever, chills.   HENT: Negative for ear pain, congestion, sore throat, and ear discharge.    Eyes: Negative for blurred vision, double vision.   Respiratory: Negative for cough, hemoptysis, shortness of breath, wheezing and stridor.    Cardiovascular: Negative for chest pain, palpitations and orthopnea.   Gastrointestinal: Negative for heartburn, nausea, vomiting, abdominal pain and blood in stool.   Genitourinary: Negative for urgency, frequency   Musculoskeletal: Negative for myalgias, back pain and joint pain.   Neurological: Negative for tingling, speech change and headaches.   Endo/Heme/Allergies: Does not bruise/bleed easily.   Psychiatric/Behavioral: Negative for depression, suicidal ideas and hallucinations. The patient is not nervous/anxious.    Physical Examination:  /78   Pulse 68   Temp 98  F (36.7  C)   Resp 18   Ht 1.556 m (5' 1.25\")   Wt 66.7 kg (147 lb)   SpO2 98%   BMI 27.55 kg/m      General: Alert and orientedx4, no acute distress, well-developed/well-nourished, ambulating without assistance  HEENT: normocephalic atraumatic, extraocular movements intact, sclerae anicteric, Trachea " mideline  Chest:   Clear to auscultation bilaterally.  Cardiac: S1S2 , regular rate and rhythm without additional sounds  Abdomen: Soft, non-tender, non-distended  Extremities: Cursory exam unremarkable.  No peripheral edema noted.  Skin: Warm, dry, < 2 sec cap refill  Neuro: CN 2-12 grossly intact, no focal deficit, GCS 15  Psych: Pleasant, calm, asks appropriate questions      Assessment/Plan:  #1 Colonoscopy    Estella M Aas and I had a discussion about colonoscopies.  The indications, risks, benefits, althernatives and technical aspects of whole colon colonoscopy were outlined with risks including, but not limited to, perforation, bleeding and inability to visualize entire colon.  Management of each was reviewed including the risk for life saving surgery and possible admittance to the hospital.  Her questions were asked and answered.  We will proceed with colonoscopy with Dr. Jauregui as scheduled.  Lety Magaña [facility]  [address] [place] [address]    [phone]    Referring Provider:  No referring provider defined for this encounter.     Primary Care Provider:  Mia Villagomez

## 2020-06-18 NOTE — OP NOTE
Estella ORO Aas MRN# [id number]   YOB: 1960 Age: 59 year old      Date of Admission:  6/18/2020    Primary care provider: Mia Villagomez    PREOPERATIVE DIAGNOSIS:  Screening colonoscopy.         POSTOPERATIVE DIAGNOSIS:  Normal colonoscopic examination.          PROCEDURE:  Whole colon colonoscopy.         INDICATIONS:  This 59 year old female presents for interval screening colonoscopy.        OPERATIVE FINDINGS:  The colonic mucosa was normal from anus to cecum.          DESCRIPTION OF PROCEDURE:  With the patient in the supine position on the transport cart, IV sedation was administered by the nurse anesthetist.  Her correct identity and planned procedure were confirmed during the requisite timeout pause and she was rolled to the left lateral position.  The anus was digitally dilated and the fiberoptic colonoscope was introduced and negotiated through the length of the colon to the cecal base.  The cecum was intubated and its landmarks clearly identified.  A circumferential examination of the mucosa on introduction of the colonoscope and on its slow withdrawal confirmed the absence of polyp, neoplasia, inflammation and/or stricture.  There were multiple diverticuli noted.  Retroflex in the rectal ampulla showed no evidence of pathology.  Air was aspirated and the colonoscope was withdrawn; the patient was returned to day surgery in good condition, without suggestion of complication and with our invitation to return in 10 years for followup screening examination.   The post surgical debriefing was held and acknowledged at completion.          Bradly Jauregui MD   6/18/2020 3:37 PM

## 2020-06-18 NOTE — OR NURSING
Patient and responsible adult given discharge instructions with no questions regarding instructions. Julio Cesar score 20. Pain level 0/10.  Discharged from unit via ambulation. Patient discharged to home with daughter.

## 2020-06-18 NOTE — DISCHARGE INSTRUCTIONS

## 2020-06-19 NOTE — ANESTHESIA POSTPROCEDURE EVALUATION
Patient: Estella ORO [facility]    Procedure(s):  COLONOSCOPY    Diagnosis:Special screening for malignant neoplasms, colon [Z12.11]  Diagnosis Additional Information: No value filed.    Anesthesia Type:  MAC    Note:  Anesthesia Post Evaluation    Patient location during evaluation: Bedside  Patient participation: Able to fully participate in evaluation  Level of consciousness: awake and alert  Pain management: adequate  Airway patency: patent  Cardiovascular status: acceptable  Respiratory status: acceptable  Hydration status: acceptable  PONV: none     Anesthetic complications: None          Last vitals:  Vitals:    06/18/20 1545 06/18/20 1550 06/18/20 1555   BP: 95/82 115/66 101/71   Pulse: 68 63 65   Resp:   16   Temp:   97.8  F (36.6  C)   SpO2: 95% 95% 95%         Electronically Signed By: POONAM Washington CRNA  June 18, 2020  8:34 PM

## 2020-07-24 ENCOUNTER — TRANSFERRED RECORDS (OUTPATIENT)
Dept: HEALTH INFORMATION MANAGEMENT | Facility: CLINIC | Age: 60
End: 2020-07-24

## 2020-09-17 ENCOUNTER — APPOINTMENT (OUTPATIENT)
Dept: CT IMAGING | Facility: HOSPITAL | Age: 60
End: 2020-09-17
Attending: NURSE PRACTITIONER
Payer: OTHER MISCELLANEOUS

## 2020-09-17 ENCOUNTER — HOSPITAL ENCOUNTER (EMERGENCY)
Facility: HOSPITAL | Age: 60
Discharge: HOME | End: 2020-09-17
Attending: NURSE PRACTITIONER | Admitting: NURSE PRACTITIONER
Payer: OTHER MISCELLANEOUS

## 2020-09-17 VITALS
SYSTOLIC BLOOD PRESSURE: 131 MMHG | TEMPERATURE: 97.4 F | OXYGEN SATURATION: 95 % | DIASTOLIC BLOOD PRESSURE: 97 MMHG | HEART RATE: 67 BPM | RESPIRATION RATE: 16 BRPM

## 2020-09-17 DIAGNOSIS — M54.2 NECK PAIN: ICD-10-CM

## 2020-09-17 DIAGNOSIS — S06.0X0A CONCUSSION WITHOUT LOSS OF CONSCIOUSNESS, INITIAL ENCOUNTER: ICD-10-CM
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
## 2020-09-17 PROCEDURE — 72125 CT NECK SPINE W/O DYE: CPT | Mod: TC

## 2020-09-17 PROCEDURE — 25000132 ZZH RX MED GY IP 250 OP 250 PS 637: Performed by: NURSE PRACTITIONER

## 2020-09-17 PROCEDURE — 25000128 H RX IP 250 OP 636: Performed by: NURSE PRACTITIONER

## 2020-09-17 PROCEDURE — 99284 EMERGENCY DEPT VISIT MOD MDM: CPT | Mod: 25

## 2020-09-17 PROCEDURE — 99284 EMERGENCY DEPT VISIT MOD MDM: CPT | Mod: Z6 | Performed by: NURSE PRACTITIONER

## 2020-09-17 PROCEDURE — 70450 CT HEAD/BRAIN W/O DYE: CPT | Mod: TC

## 2020-09-17 RX ORDER — ONDANSETRON 4 MG/1
4 TABLET, ORALLY DISINTEGRATING ORAL ONCE
Status: COMPLETED | OUTPATIENT
Start: 2020-09-17 | End: 2020-09-17

## 2020-09-17 RX ORDER — ONDANSETRON 4 MG
4 TABLET,DISINTEGRATING ORAL ONCE
Status: COMPLETED | OUTPATIENT
Start: 2020-09-17 | End: 2020-09-17

## 2020-09-17 RX ORDER — IBUPROFEN 200 MG
1 CAPSULE ORAL 2 TIMES DAILY
COMMUNITY
End: 2023-01-05

## 2020-09-17 RX ORDER — ACETAMINOPHEN 325 MG/1
975 TABLET ORAL ONCE
Status: COMPLETED | OUTPATIENT
Start: 2020-09-17 | End: 2020-09-17

## 2020-09-17 RX ADMIN — Medication 4 MG: at 22:24

## 2020-09-17 RX ADMIN — ACETAMINOPHEN 975 MG: 325 TABLET ORAL at 20:13

## 2020-09-17 RX ADMIN — ONDANSETRON 4 MG: 4 TABLET, ORALLY DISINTEGRATING ORAL at 20:13

## 2020-09-17 ASSESSMENT — ENCOUNTER SYMPTOMS
VOMITING: 0
WOUND: 0
FEVER: 0
DYSURIA: 0
CONFUSION: 0
NECK PAIN: 1
ABDOMINAL PAIN: 0
HEADACHES: 1
NECK STIFFNESS: 1
BACK PAIN: 0
WEAKNESS: 0
NAUSEA: 1
CHILLS: 0
SHORTNESS OF BREATH: 0
NUMBNESS: 0

## 2020-09-17 NOTE — ED AVS SNAPSHOT
[facility]  [address] [address]  Phone:  [phone]                                    Estella ORO Aas   MRN: [id number]    Department:  [facility]   Date of Visit:  9/17/2020           After Visit Summary Signature Page    I have received my discharge instructions, and my questions have been answered. I have discussed any challenges I see with this plan with the nurse or doctor.    ..........................................................................................................................................  Patient/Patient Representative Signature      ..........................................................................................................................................  Patient Representative Print Name and Relationship to Patient    ..................................................               ................................................  Date                                   Time    ..........................................................................................................................................  Reviewed by Signature/Title    ...................................................              ..............................................  Date                                               Time          22EPIC Rev 08/18

## 2020-09-18 ENCOUNTER — NURSE TRIAGE (OUTPATIENT)
Dept: FAMILY MEDICINE | Facility: OTHER | Age: 60
End: 2020-09-18

## 2020-09-18 ENCOUNTER — TELEPHONE (OUTPATIENT)
Dept: FAMILY MEDICINE | Facility: OTHER | Age: 60
End: 2020-09-18

## 2020-09-18 NOTE — DISCHARGE INSTRUCTIONS
Drink plenty of fluids,  Rest and avoid loud noise, screen time.  Zofran for nausea   Return for severe headache, vomiting, visual changes.  See your doctor on Monday and do not return to work until followed up

## 2020-09-18 NOTE — ED TRIAGE NOTES
Pt states she was up a latter working on pipes and ladder tipped over. Pt fell back against the wall from approx 6 feet up. C/O back pain around shoulder blade area and neck pain and pain in back of head. Denies any LOC, denies being on blood thinners.

## 2020-09-18 NOTE — TELEPHONE ENCOUNTER
"    Reason for Disposition    [1] COVID-19 infection suspected by caller or triager AND [2] mild symptoms (cough, fever, or others) AND [3] no complications or SOB    Answer Assessment - Initial Assessment Questions  1. COVID-19 DIAGNOSIS: \"Who made your Coronavirus (COVID-19) diagnosis?\" \"Was it confirmed by a positive lab test?\" If not diagnosed by a HCP, ask \"Are there lots of cases (community spread) where you live?\" (See [facility] health department website, if unsure)      yes  2. ONSET: \"When did the COVID-19 symptoms start?\"       3 weeks ago  3. WORST SYMPTOM: \"What is your worst symptom?\" (e.g., cough, fever, shortness of breath, muscle aches)       Productive cough  4. COUGH: \"Do you have a cough?\" If so, ask: \"How bad is the cough?\"        cough  5. FEVER: \"Do you have a fever?\" If so, ask: \"What is your temperature, how was it measured, and when did it start?\"      no  6. RESPIRATORY STATUS: \"Describe your breathing?\" (e.g., shortness of breath, wheezing, unable to speak)       no  7. BETTER-SAME-WORSE: \"Are you getting better, staying the same or getting worse compared to yesterday?\"  If getting worse, ask, \"In what way?\"      Getting a little better  8. HIGH RISK DISEASE: \"Do you have any chronic medical problems?\" (e.g., asthma, heart or lung disease, weak immune system, etc.)      No  Does allergies  9. PREGNANCY: \"Is there any chance you are pregnant?\" \"When was your last menstrual period?\"      no  10. OTHER SYMPTOMS: \"Do you have any other symptoms?\"  (e.g., chills, fatigue, headache, loss of smell or taste, muscle pain, sore throat)        Fatigue, headache sinus, sore throat    Protocols used: CORONAVIRUS (COVID-19) DIAGNOSED OR [id number] 8.4.20      "

## 2020-09-18 NOTE — TELEPHONE ENCOUNTER
Reason for ER/UC visit: concussion fell off the ladder    New or Worsening symptoms:  Bad headache and sore from falling     Prescription Received/Picked up from Pharmacy?: something for nausea and tylenol Any questions regarding your prescription?:     Follow-up Results or Labs that are pending:     Do you have any questions or concerns?: not at this time  Does feel foggy    ER Recommends Follow-up By:  With in 3-5 days    RN Recommendations: will route to provider for scheduling    Thank you for your time, if you start feeling worse, or have any further questions, please feel free to contact us again at [phone].  If needing immediate medical attention at any time please call 911/Go to the ER.

## 2020-09-18 NOTE — ED PROVIDER NOTES
"  History     Chief Complaint   Patient presents with     Fall     The history is provided by the patient and medical records.     Estella ORO Aas is a 59 year old female who presents to the ED after falling backward into a wall from a 6 foot ladder. The patient had the ladder close to a wall and was looking up attempting to fix a pipe when the ladder slipped out from her, throwing her back in to the concrete wall. She hit the back of her neck and head. She is complaining of a severe headache, nausea, cervical spinous process pain, and bilateral shoulder pain. She denied loss of consciousness and she is not anticoagulated. She has had no emesis. She denies numbness, tingling and/or weakness in the upper and lower extremities. She has been ambulatory and has no gait complaints.     Allergies:  Allergies   Allergen Reactions     Cats      \"Cat/feline product derivatives\"       Problem List:    Patient Active Problem List    Diagnosis Date Noted     Special screening for malignant neoplasms, colon 02/26/2020     Priority: Medium     Added automatically from request for surgery [id number]       RADHA (generalized anxiety disorder) 08/18/2019     Priority: Medium     Chronic idiopathic urticaria 08/18/2019     Priority: Medium     Advanced directives, counseling/discussion 02/23/2017     Priority: Medium     Advance Care Planning 2/23/2017: ACP Review of Chart / Resources Provided:  Reviewed chart for advance care plan.  Estella ORO Aas has no plan or code status on file. Discussed available resources and provided with information. Confirmed code status reflects current choices pending further ACP discussions.  Confirmed/documented legally designated decision makers.  Added by Gail Zambrano                Past Medical History:    Past Medical History:   Diagnosis Date     Depressive disorder, not elsewhere classified 7/23/2008     Dysthymic disorder 8/20/2008       Past Surgical History:    Past Surgical History:   Procedure " Laterality Date     AS TOTAL HIP ARTHROPLASTY Left 2017     basilar joint injection, left thumb  2017     C TOTAL HIP ARTHROPLASTY Left 2017     COLONOSCOPY  2006     COLONOSCOPY N/A 2020    Procedure: COLONOSCOPY;  Surgeon: Bradly Jauregui MD;  Location: [facility]     hand      RT     HYSTERECTOMY TOTAL ABDOMINAL N/A     Cervix removed. Ovaries remain.        Family History:    Family History   Problem Relation Age of Onset     Other - See Comments Father         Alzheimer's Disease     C.A.D. Father      Hypertension Mother        Social History:  Marital Status:  Single [1]  Social History     Tobacco Use     Smoking status: Current Every Day Smoker     Packs/day: 0.50     Types: Cigarettes     Last attempt to quit: 10/1/2017     Years since quittin.9     Smokeless tobacco: Never Used     Tobacco comment: Quit ; no passive exposure   Substance Use Topics     Alcohol use: Yes     Comment: Socially     Drug use: No        Medications:    calcium citrate (CITRACAL) 950 MG tablet  cholecalciferol (VITAMIN D3) 5000 UNITS TABS tablet  escitalopram (LEXAPRO) 10 MG tablet          Review of Systems   Constitutional: Negative for chills and fever.   Eyes: Negative for visual disturbance.   Respiratory: Negative for shortness of breath.    Cardiovascular: Negative for chest pain.   Gastrointestinal: Positive for nausea. Negative for abdominal pain and vomiting.   Genitourinary: Negative for dysuria.   Musculoskeletal: Positive for neck pain and neck stiffness. Negative for back pain.   Skin: Negative for wound.   Neurological: Positive for headaches. Negative for weakness and numbness.   Psychiatric/Behavioral: Negative for confusion.       Physical Exam   BP: 132/86  Pulse: 86  Temp: 97.4  F (36.3  C)  Resp: 16  SpO2: 96 %      Physical Exam  Vitals signs and nursing note reviewed.   Constitutional:       General: She is not in acute distress.     Appearance: Normal  appearance. She is well-developed. She is not ill-appearing or toxic-appearing.      Interventions: Cervical collar in place.      Comments: c collar applied in triage    HENT:      Head: Contusion present. No raccoon eyes, Adams's sign, abrasion or laceration.        Comments: Large contusion on the left occiput      Right Ear: Hearing normal.      Left Ear: Hearing normal.      Nose: Nose normal.      Mouth/Throat:      Lips: Pink.      Mouth: Mucous membranes are moist.   Eyes:      General: Lids are normal.      Extraocular Movements: Extraocular movements intact.      Conjunctiva/sclera: Conjunctivae normal.      Pupils: Pupils are equal, round, and reactive to light.   Neck:      Musculoskeletal: Normal range of motion. Pain with movement and spinous process tenderness present.        Comments: c collar removed after negative c spine CT.  Patient able to perform active ROM ( chin to chest and side to side)  Cardiovascular:      Rate and Rhythm: Normal rate and regular rhythm.      Pulses:           Radial pulses are 2+ on the right side and 2+ on the left side.      Heart sounds: S1 normal and S2 normal. No murmur.   Pulmonary:      Effort: Pulmonary effort is normal. No accessory muscle usage or respiratory distress.      Breath sounds: Normal breath sounds.   Abdominal:      Palpations: Abdomen is soft.   Musculoskeletal:      Right shoulder: She exhibits normal range of motion and no deformity.      Left shoulder: She exhibits normal range of motion and no deformity.      Right hip: Normal.      Left hip: Normal.      Thoracic back: Normal.      Lumbar back: Normal.      Right lower leg: No edema.      Left lower leg: No edema.   Skin:     General: Skin is warm and dry.   Neurological:      Mental Status: She is alert and oriented to person, place, and time.      Cranial Nerves: No dysarthria or facial asymmetry.      Sensory: Sensation is intact.      Motor: Motor function is intact. No weakness or  tremor.      Coordination: Coordination normal.      Gait: Gait is intact.   Psychiatric:         Mood and Affect: Mood normal.         Behavior: Behavior is cooperative.         ED Course              Results for orders placed or performed during the hospital encounter of 09/17/20 (from the past 24 hour(s))   Cervical spine CT w/o contrast    Narrative    PROCEDURE: CT CERVICAL SPINE W/O CONTRAST     HISTORY: C-spine trauma, high clinical risk (NEXUS/CCR); c spine  tenderness.    TECHNIQUE: Helical noncontrast CT images of the cervical spine.    COMPARISON: None.    FINDINGS:     No acute fracture is identified. The vertebral bodies are normal in  height. The cervical lordosis is straightened. The C1-2 articulation  and the craniocervical junction are intact.     Scattered facet degenerative changes are present. The intervertebral  disk spaces are relatively maintained. No severe spinal stenosis.     The paravertebral soft tissues are unremarkable. The lung apices are  clear.      Impression    IMPRESSION: No evidence of acute cervical spine fracture.    JOELLEN RIDLEY MD   Head CT w/o contrast    Narrative    PROCEDURE: CT HEAD W/O CONTRAST     HISTORY: Head trauma, minor, GCS>=13, high clinical risk, initial  exam.    COMPARISON: None.    TECHNIQUE:  Helical images of the head from the foramen magnum to the  vertex were obtained without contrast.    FINDINGS: The ventricles and sulci are normal in volume. No acute  intracranial hemorrhage, mass effect, midline shift, hydrocephalus or  basilar cystern effacement are present.    The grey-white matter interface is preserved.    The calvarium is intact. The mastoid air cells are clear.  The  visualized paranasal sinuses are clear.      Impression    IMPRESSION: No CT evidence of an acute intracranial process.      JOELLEN RIDLEY MD       Medications   acetaminophen (TYLENOL) tablet 975 mg (975 mg Oral Given 9/17/20 2013)   ondansetron (ZOFRAN-ODT) ODT tab 4  mg (4 mg Oral Given 9/17/20 2013)   ondansetron (ZOFRAN-ODT) 4 tablets ed starter pack 4 mg (4 mg Oral Given 9/17/20 2596)       Assessments & Plan (with Medical Decision Making)   The patient was interviewed and examined . She was awake and alert without focal deficit. There was no vital sign abnormality. GCS was 15.  Large contusion on the left occiput. Spinous process pain to palpation.   Medicated with tylenol for pain and zofran for nausea.  Ct imaging of the head was without intracranial bleeding or fracture. C spine imaging was negative for acute fracture. CMS remained intact throughout the ED visit.  Discussed concussion care. Recommended rest, hydration, low activity. No work until followed up by PCP who I advised her to see by Monday. ( three days).  Recommended return for any severe headache, visual complaints, mental status changes, or ANY other concerns.    I have reviewed the nursing notes.    I have reviewed the findings, diagnosis, plan and need for follow up with the patient.    New Prescriptions    No medications on file       Final diagnoses:   Concussion without loss of consciousness, initial encounter   Fall, initial encounter   Neck pain       9/17/2020   [facility]     Abril Gan CNP  09/17/20 4029

## 2020-09-19 ENCOUNTER — OFFICE VISIT (OUTPATIENT)
Dept: FAMILY MEDICINE | Facility: OTHER | Age: 60
End: 2020-09-19
Attending: PHYSICIAN ASSISTANT
Payer: COMMERCIAL

## 2020-09-19 DIAGNOSIS — Z20.822 COVID-19 RULED OUT: Primary | ICD-10-CM

## 2020-09-19 PROCEDURE — U0003 INFECTIOUS AGENT DETECTION BY NUCLEIC ACID (DNA OR RNA); SEVERE ACUTE RESPIRATORY SYNDROME CORONAVIRUS 2 (SARS-COV-2) (CORONAVIRUS DISEASE [COVID-19]), AMPLIFIED PROBE TECHNIQUE, MAKING USE OF HIGH THROUGHPUT TECHNOLOGIES AS DESCRIBED BY CMS-2020-01-R: HCPCS | Performed by: PHYSICIAN ASSISTANT

## 2020-09-20 LAB
SARS-COV-2 RNA SPEC QL NAA+PROBE: NOT DETECTED
SPECIMEN SOURCE: NORMAL

## 2020-09-21 ENCOUNTER — OFFICE VISIT (OUTPATIENT)
Dept: FAMILY MEDICINE | Facility: OTHER | Age: 60
End: 2020-09-21
Attending: PHYSICIAN ASSISTANT
Payer: OTHER MISCELLANEOUS

## 2020-09-21 VITALS
HEART RATE: 68 BPM | TEMPERATURE: 97.8 F | SYSTOLIC BLOOD PRESSURE: 106 MMHG | DIASTOLIC BLOOD PRESSURE: 72 MMHG | BODY MASS INDEX: 28.92 KG/M2 | OXYGEN SATURATION: 96 % | WEIGHT: 153.2 LBS | HEIGHT: 61 IN

## 2020-09-21 DIAGNOSIS — F17.200 TOBACCO USE DISORDER: ICD-10-CM

## 2020-09-21 DIAGNOSIS — M62.838 CERVICAL PARASPINAL MUSCLE SPASM: ICD-10-CM

## 2020-09-21 DIAGNOSIS — Z87.820 SIGNIFICANT CLOSED HEAD TRAUMA WITHIN PAST 3 MONTHS: Primary | ICD-10-CM

## 2020-09-21 DIAGNOSIS — J01.01 ACUTE RECURRENT MAXILLARY SINUSITIS: ICD-10-CM

## 2020-09-21 PROCEDURE — 99214 OFFICE O/P EST MOD 30 MIN: CPT | Performed by: PHYSICIAN ASSISTANT

## 2020-09-21 RX ORDER — GUAIFENESIN AND DEXTROMETHORPHAN HYDROBROMIDE 600; 30 MG/1; MG/1
1 TABLET, EXTENDED RELEASE ORAL EVERY 12 HOURS
Qty: 30 TABLET | Refills: 1 | Status: SHIPPED | OUTPATIENT
Start: 2020-09-21 | End: 2021-11-05

## 2020-09-21 RX ORDER — AMOXICILLIN 875 MG
875 TABLET ORAL 2 TIMES DAILY
Qty: 20 TABLET | Refills: 1 | Status: SHIPPED | OUTPATIENT
Start: 2020-09-21 | End: 2020-10-05

## 2020-09-21 RX ORDER — CYCLOBENZAPRINE HCL 10 MG
10 TABLET ORAL 2 TIMES DAILY PRN
Qty: 60 TABLET | Refills: 1 | Status: SHIPPED | OUTPATIENT
Start: 2020-09-21 | End: 2021-11-05

## 2020-09-21 ASSESSMENT — PATIENT HEALTH QUESTIONNAIRE - PHQ9: SUM OF ALL RESPONSES TO PHQ QUESTIONS 1-9: 0

## 2020-09-21 ASSESSMENT — ANXIETY QUESTIONNAIRES
6. BECOMING EASILY ANNOYED OR IRRITABLE: NOT AT ALL
4. TROUBLE RELAXING: NOT AT ALL
GAD7 TOTAL SCORE: 0
2. NOT BEING ABLE TO STOP OR CONTROL WORRYING: NOT AT ALL
5. BEING SO RESTLESS THAT IT IS HARD TO SIT STILL: NOT AT ALL
7. FEELING AFRAID AS IF SOMETHING AWFUL MIGHT HAPPEN: NOT AT ALL
3. WORRYING TOO MUCH ABOUT DIFFERENT THINGS: NOT AT ALL
1. FEELING NERVOUS, ANXIOUS, OR ON EDGE: NOT AT ALL

## 2020-09-21 ASSESSMENT — MIFFLIN-ST. JEOR: SCORE: 1208.64

## 2020-09-21 ASSESSMENT — PAIN SCALES - GENERAL: PAINLEVEL: MODERATE PAIN (5)

## 2020-09-21 NOTE — PATIENT INSTRUCTIONS
Thank you for choosing [facility].   I have office hours 8:00 am to 4:30 pm on Monday's, Wednesday's, Thursday's and Friday's. My nurse and I are out of the office every Tuesday.    Following your visit, when your labs and diagnostic testing have returned, I will review then and you will be contacted by my nurse.  If you are on My Chart, you can also view results there.    For refills, notify your pharmacy regarding what you need and the pharmacy will generate a refill request. Do not call my nurse as she is unable to process refill request. Please plan ahead and allow 3-5 days for refill requests.    You will generally receive a reminder call the day prior to your appointment.  If you cannot attend your appointment, please cancel your appointment with as much notice as possible.  If there is a pattern of failure to present for your appointments, I cannot provide consistent, meaningful, ongoing care for you. It is very important to me that you come in for your care, so we can best assist you with your health care needs.    IMPORTANT:  Please note that it is my standard of practice to NOT participate in prescribing ongoing requested Narcotic Analgesic therapy, and/or participate in the prescribing of other controlled substances.  My nurse and I am happy to assist you with the process of referral for alternative pain management as needed, and other treatment modalities including but not limited to:  Physical Therapy, Physical Medicine and Rehab, Counseling, Chiropractic Care, Orthopedic Care, and non-narcotic medication management.     In the event that you need to be seen for emergent concerns and I am out of office,  please see one of my colleagues for acute concerns.  You may also present to  or ER.  I appreciate the opportunity to serve you and look forward to supporting your healthcare needs in the future. Please contact me with any questions or concerns that you may  have.    Sincerely,      Mia Villagomez RN, PA-C

## 2020-09-21 NOTE — PROGRESS NOTES
"Occupational Visit     SUBJECTIVE:  Estella ORO Aas, 59 year old, female is seen for new evaluation and treatment of occupational injury. Date of injury is 9/17/20.    Linked Episodes   Type: Episode: Status: Noted: Resolved: Last update: Updated by:   WORK COMP Head Injury Active 9/17/2020 9/21/2020  1:59 PM Yajaira Ely LPN      Comments:       Musculoskeletal problem/pain      Duration: 9/17/20    Description  Location: hit shoudlers then head    Intensity:  Moderate 5/10    Accompanying signs and symptoms: radiation of pain to across shoulder and back of head    History  Previous similar problem: no   Previous evaluation:  CT    Precipitating or alleviating factors:  Trauma or overuse: YES- fell from celing height to cemet floor  Aggravating factors include: sitting, standing, walking, climbing stairs and lifting    Therapies tried and outcome: acetaminophen and heat     Neck Pain      Duration:9/17/20    Description:  Location: fell from celing height to the cemet floor  Radiation: into the right shoulder and nto the left shoulder    Intensity:  Moderate 5/10    Accompanying signs and symptoms: still sore    History (similar episodes/previous evaluation): None    Precipitating or alleviating factors: None    Therapies tried and outcome: tylenol and heat        Allergies   Allergen Reactions     Cats      \"Cat/feline product derivatives\"         Review of Systems:  Constitutional, HEENT, cardiovascular, pulmonary, gi and gu systems are negative, except as otherwise noted.      OBJECTIVE:  Vitals:    09/21/20 1359   BP: 106/72   Pulse: 68   Temp: 97.8  F (36.6  C)   SpO2: 96%               Exam:  GENERAL:: healthy, alert and no distress  MS: extremities- no gross deformities noted, no edema  NEURO: strength and tone- normal, sensory exam- grossly normal, mentation- has mild dizziness on sitting up and has loss of balance mild on her cerebellar testing.  intact, speech- normal, reflexes- symmetric.  Her eye on " the right is not showing accomodation as well as left. Little reflex noted on her eye exam.   BACK: no CVA tenderness, positive for  paralumbar tenderness, severe neck stiffness and pain. limited lateral movements due to stiffness and pain in neck. Shoulders are very tight. Arthritis hands good .       Labs: No results found for this or any previous visit (from the past 24 hour(s)).      ASSESSMENT/PLAN:  1. Significant closed head trauma within past 3 months  She had a visit to the ER. She is having trouble reading and dizziness and comprehension. Can't sleep well. Severe discomfort in her neck and shoulders. Will have her see Chiropractic. Start on Flexeril. She will be seeing us back as recommended in 2 weeks.     2. Acute recurrent maxillary sinusitis  Chronic issue given amoxl.   - amoxicillin (AMOXIL) 875 MG tablet; Take 1 tablet (875 mg) by mouth 2 times daily  Dispense: 20 tablet; Refill: 1  - dextromethorphan-guaiFENesin (MUCINEX DM)  MG 12 hr tablet; Take 1 tablet by mouth every 12 hours  Dispense: 30 tablet; Refill: 1    3. Tobacco use disorder  Referral.   - QUIT PARTNER (TOBACCO CESSATION) REFERRAL    4. Cervical paraspinal muscle spasm  See Duane BONILLA at the chiropractic care center. He has seen her before knowing her baseline.   - cyclobenzaprine (FLEXERIL) 10 MG tablet; Take 1 tablet (10 mg) by mouth 2 times daily as needed for muscle spasms  Dispense: 60 tablet; Refill: 1

## 2020-09-21 NOTE — NURSING NOTE
"Chief Complaint   Patient presents with     Work Comp       Initial /72 (BP Location: Right arm, Patient Position: Sitting, Cuff Size: Adult Regular)   Pulse 68   Temp 97.8  F (36.6  C) (Tympanic)   Ht 1.56 m (5' 1.4\")   Wt 69.5 kg (153 lb 3.2 oz)   SpO2 96%   BMI 28.57 kg/m   Estimated body mass index is 28.57 kg/m  as calculated from the following:    Height as of this encounter: 1.56 m (5' 1.4\").    Weight as of this encounter: 69.5 kg (153 lb 3.2 oz).  Medication Reconciliation: complete  Yajaira Chacon LPN  "

## 2020-09-22 ASSESSMENT — ANXIETY QUESTIONNAIRES: GAD7 TOTAL SCORE: 0

## 2020-09-24 ENCOUNTER — OFFICE VISIT (OUTPATIENT)
Dept: CHIROPRACTIC MEDICINE | Facility: OTHER | Age: 60
End: 2020-09-24
Attending: CHIROPRACTOR
Payer: OTHER MISCELLANEOUS

## 2020-09-24 DIAGNOSIS — M99.03 SEGMENTAL AND SOMATIC DYSFUNCTION OF LUMBAR REGION: ICD-10-CM

## 2020-09-24 DIAGNOSIS — M54.2 CERVICALGIA: ICD-10-CM

## 2020-09-24 DIAGNOSIS — M99.02 SEGMENTAL AND SOMATIC DYSFUNCTION OF THORACIC REGION: ICD-10-CM

## 2020-09-24 DIAGNOSIS — M99.01 SEGMENTAL AND SOMATIC DYSFUNCTION OF CERVICAL REGION: Primary | ICD-10-CM

## 2020-09-24 PROCEDURE — 98941 CHIROPRACT MANJ 3-4 REGIONS: CPT | Mod: AT | Performed by: CHIROPRACTOR

## 2020-09-24 PROCEDURE — 99212 OFFICE O/P EST SF 10 MIN: CPT | Mod: 25 | Performed by: CHIROPRACTOR

## 2020-09-28 NOTE — PROGRESS NOTES
Subjective Finding:    Chief compalint: Patient presents with:  Back Pain  Neck Pain: from a fall off a ladder  , Pain Scale: 8/10, Intensity: sharp, Duration: 10 days, Change since last visit: , Radiating: no.    Date of injury:     Activities that the pain restricts:   Home/household/hobbies/social activities: no.  Work duties: no.  Sleep: no.  Makes symptoms better: rest.  Makes symptoms worse: activity.  Have you seen anyone else for the symptoms? yes.  Work related: yes  Automobile related injury: no.    Objective and Assessment:    Posture Analysis:   High shoulder: right.  Head tilt: right.  High iliac crest: .  Head carriage: forward.  Thoracic Kyphosis: neutral.  Lumbar Lordosis: neutral.    Lumbar Range of Motion: extension decreased.  Cervical Range of Motion: .  Thoracic Range of Motion: extension decreased.  Extremity Range of Motion: .    Palpation:   Lev scapulae: stiff, referred pain: no   Subocc mM  Lumbar paraspinals  Segmental dysfunction pre-treatment:L5  T34.  C\2  C56    Assessment post-treatment:  Cervical: ROM increased.  Thoracic: ROM increased.  Lumbar: ROM increased.    Comments: .  Past left hip replacement      Complicating Factors: .    Plan / Procedure:    Expected release date: .  Treatment plan: 2 times 2 weeks.  Instructed patient: ice 20 minutes every other hour as needed.  Short term goals: reduce pain.  Long term goals: restore normal function.  Prognosis: excellent.

## 2020-09-30 ENCOUNTER — OFFICE VISIT (OUTPATIENT)
Dept: CHIROPRACTIC MEDICINE | Facility: OTHER | Age: 60
End: 2020-09-30
Attending: CHIROPRACTOR
Payer: OTHER MISCELLANEOUS

## 2020-09-30 DIAGNOSIS — M99.02 SEGMENTAL AND SOMATIC DYSFUNCTION OF THORACIC REGION: ICD-10-CM

## 2020-09-30 DIAGNOSIS — M54.2 CERVICALGIA: ICD-10-CM

## 2020-09-30 DIAGNOSIS — M99.01 SEGMENTAL AND SOMATIC DYSFUNCTION OF CERVICAL REGION: Primary | ICD-10-CM

## 2020-09-30 PROCEDURE — 98941 CHIROPRACT MANJ 3-4 REGIONS: CPT | Mod: AT | Performed by: CHIROPRACTOR

## 2020-09-30 NOTE — PROGRESS NOTES
Subjective Finding:    Chief compalint: Patient presents with:  Neck Pain  , Pain Scale: 8/10, Intensity: sharp, Duration: 10 days, Change since last visit: , Radiating: no.    Date of injury:     Activities that the pain restricts:   Home/household/hobbies/social activities: no.  Work duties: no.  Sleep: no.  Makes symptoms better: rest.  Makes symptoms worse: activity.  Have you seen anyone else for the symptoms? yes.  Work related: yes  Automobile related injury: no.    Objective and Assessment:    Posture Analysis:   High shoulder: right.  Head tilt: right.  High iliac crest: .  Head carriage: forward.  Thoracic Kyphosis: neutral.  Lumbar Lordosis: neutral.    Lumbar Range of Motion: extension decreased.  Cervical Range of Motion: .  Thoracic Range of Motion: extension decreased.  Extremity Range of Motion: .    Palpation:   Lev scapulae: stiff, referred pain: no   Subocc mM  Lumbar paraspinals  Segmental dysfunction pre-treatment:L5  T34.  C\2  C56    Assessment post-treatment:  Cervical: ROM increased.  Thoracic: ROM increased.  Lumbar: ROM increased.    Comments: .  Past left hip replacement      Complicating Factors: .    Plan / Procedure:    Expected release date: .  Treatment plan: 2 times 2 weeks.  Instructed patient: ice 20 minutes every other hour as needed.  Short term goals: reduce pain.  Long term goals: restore normal function.  Prognosis: excellent.

## 2020-10-02 ENCOUNTER — OFFICE VISIT (OUTPATIENT)
Dept: CHIROPRACTIC MEDICINE | Facility: OTHER | Age: 60
End: 2020-10-02
Attending: CHIROPRACTOR
Payer: OTHER MISCELLANEOUS

## 2020-10-02 DIAGNOSIS — M99.01 SEGMENTAL AND SOMATIC DYSFUNCTION OF CERVICAL REGION: Primary | ICD-10-CM

## 2020-10-02 DIAGNOSIS — M99.02 SEGMENTAL AND SOMATIC DYSFUNCTION OF THORACIC REGION: ICD-10-CM

## 2020-10-02 DIAGNOSIS — M99.03 SEGMENTAL AND SOMATIC DYSFUNCTION OF LUMBAR REGION: ICD-10-CM

## 2020-10-02 DIAGNOSIS — M54.2 CERVICALGIA: ICD-10-CM

## 2020-10-02 PROCEDURE — 98941 CHIROPRACT MANJ 3-4 REGIONS: CPT | Mod: AT | Performed by: CHIROPRACTOR

## 2020-10-02 NOTE — PROGRESS NOTES
"Subjective     Estella ORO Aas is a 59 year old female who presents to clinic today for the following health issues:    HPI         Concern - 2 week follow up  Onset: had a fall and got a concussion  Description: concussion  Intensity: mild  Progression of Symptoms:  improving  Accompanying Signs & Symptoms: not as foggy, still feels slow. Wants to discuss next steps  Previous history of similar problem: none  Precipitating factors:        Worsened by: none  Alleviating factors:        Improved by: none  Therapies tried and outcome: None      Review of Systems   Constitutional, HEENT, cardiovascular, pulmonary, gi and gu systems are negative, except as otherwise noted.      Objective    /70 (BP Location: Right arm, Patient Position: Right side, Cuff Size: Adult Regular)   Pulse 69   Temp 98.1  F (36.7  C) (Tympanic)   Ht 1.56 m (5' 1.4\")   Wt 69.4 kg (153 lb)   SpO2 97%   BMI 28.53 kg/m    Body mass index is 28.53 kg/m .  Physical Exam   GENERAL: healthy, alert and no distress  EYES: Eyes grossly normal to inspection, PERRL and conjunctivae and sclerae normal  NECK: no adenopathy, no asymmetry, masses, or scars and thyroid normal to palpation  RESP: lungs clear to auscultation - no rales, rhonchi or wheezes  CV: regular rate and rhythm, normal S1 S2, no S3 or S4, no murmur, click or rub, no peripheral edema and peripheral pulses strong  ABDOMEN: soft, nontender, no hepatosplenomegaly, no masses and bowel sounds normal  MS: extremities normal- no gross deformities noted  SKIN: no suspicious lesions or rashes  NEURO: Normal strength and tone, sensory exam grossly normal and word choice is difficulty   PSYCH: mentation appears normal and affect normal/bright    No results found for this or any previous visit (from the past 24 hour(s)).        Assessment & Plan     1. Significant closed head trauma within past 3 months  SHE IS IN TREATMENT AND HAS BETTER MOVEMENT IN HER NECK AND SHOULDERS REMAINS LIMITED IN " "COGNITION. CONCENTRATION AND WATCHING MOVEMENT IS VERY DIFFICULT FOR HER. NO FURTHER  FALLING. WORKING ON GETTING HER FOCUS AND ATTENTION. WILL SEE BACK IN 2 WEEKS. 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 LIMITED DRIVING REMAINS.            BMI:   Estimated body mass index is 28.53 kg/m  as calculated from the following:    Height as of this encounter: 1.56 m (5' 1.4\").    Weight as of this encounter: 69.4 kg (153 lb).   Weight management plan noted, stable and monitoring         See Patient Instructions    No follow-ups on file.    FELIX Joe  [facility] - [place]    "

## 2020-10-05 ENCOUNTER — OFFICE VISIT (OUTPATIENT)
Dept: CHIROPRACTIC MEDICINE | Facility: OTHER | Age: 60
End: 2020-10-05
Attending: CHIROPRACTOR
Payer: OTHER MISCELLANEOUS

## 2020-10-05 ENCOUNTER — OFFICE VISIT (OUTPATIENT)
Dept: FAMILY MEDICINE | Facility: OTHER | Age: 60
End: 2020-10-05
Attending: PHYSICIAN ASSISTANT
Payer: OTHER MISCELLANEOUS

## 2020-10-05 VITALS
TEMPERATURE: 98.1 F | SYSTOLIC BLOOD PRESSURE: 110 MMHG | WEIGHT: 153 LBS | DIASTOLIC BLOOD PRESSURE: 70 MMHG | OXYGEN SATURATION: 97 % | HEART RATE: 69 BPM | HEIGHT: 61 IN | BODY MASS INDEX: 28.89 KG/M2

## 2020-10-05 DIAGNOSIS — M54.2 CERVICALGIA: ICD-10-CM

## 2020-10-05 DIAGNOSIS — M99.02 SEGMENTAL AND SOMATIC DYSFUNCTION OF THORACIC REGION: ICD-10-CM

## 2020-10-05 DIAGNOSIS — M99.01 SEGMENTAL AND SOMATIC DYSFUNCTION OF CERVICAL REGION: Primary | ICD-10-CM

## 2020-10-05 DIAGNOSIS — Z87.820 SIGNIFICANT CLOSED HEAD TRAUMA WITHIN PAST 3 MONTHS: Primary | ICD-10-CM

## 2020-10-05 PROCEDURE — 98940 CHIROPRACT MANJ 1-2 REGIONS: CPT | Mod: AT | Performed by: CHIROPRACTOR

## 2020-10-05 PROCEDURE — 99213 OFFICE O/P EST LOW 20 MIN: CPT | Performed by: PHYSICIAN ASSISTANT

## 2020-10-05 ASSESSMENT — ANXIETY QUESTIONNAIRES
6. BECOMING EASILY ANNOYED OR IRRITABLE: NOT AT ALL
5. BEING SO RESTLESS THAT IT IS HARD TO SIT STILL: NOT AT ALL
1. FEELING NERVOUS, ANXIOUS, OR ON EDGE: NOT AT ALL
3. WORRYING TOO MUCH ABOUT DIFFERENT THINGS: NOT AT ALL
2. NOT BEING ABLE TO STOP OR CONTROL WORRYING: NOT AT ALL
7. FEELING AFRAID AS IF SOMETHING AWFUL MIGHT HAPPEN: NOT AT ALL
4. TROUBLE RELAXING: NOT AT ALL
GAD7 TOTAL SCORE: 0

## 2020-10-05 ASSESSMENT — MIFFLIN-ST. JEOR: SCORE: 1207.73

## 2020-10-05 ASSESSMENT — PATIENT HEALTH QUESTIONNAIRE - PHQ9: SUM OF ALL RESPONSES TO PHQ QUESTIONS 1-9: 0

## 2020-10-05 NOTE — NURSING NOTE
"Chief Complaint   Patient presents with     2 week follow up on concussion       Initial /70 (BP Location: Right arm, Patient Position: Right side, Cuff Size: Adult Regular)   Pulse 69   Temp 98.1  F (36.7  C) (Tympanic)   Ht 1.56 m (5' 1.4\")   Wt 69.4 kg (153 lb)   SpO2 97%   BMI 28.53 kg/m   Estimated body mass index is 28.53 kg/m  as calculated from the following:    Height as of this encounter: 1.56 m (5' 1.4\").    Weight as of this encounter: 69.4 kg (153 lb).  Medication Reconciliation: complete  Yajaira Chacon LPN  "

## 2020-10-05 NOTE — LETTER
[facility] - [place]  [address] [place] [address]  Phone: [phone]    October 5, 2020        Estella ORO Aas  [address] [place] [address]          To whom it may concern:    RE: Estella ORO Aas    Patient was seen and treated today at our clinic and missed work.  Will continue out of work for two weeks and evaluate again.       Please contact me for questions or concerns.      Sincerely,        FELIX Joe

## 2020-10-05 NOTE — PROGRESS NOTES
Subjective Finding:    Chief compalint: Patient presents with:  Back Pain  Neck Pain  , Pain Scale: 8/10, Intensity: sharp, Duration: 10 days, Change since last visit: , Radiating: no.    Date of injury:     Activities that the pain restricts:   Home/household/hobbies/social activities: no.  Work duties: no.  Sleep: no.  Makes symptoms better: rest.  Makes symptoms worse: activity.  Have you seen anyone else for the symptoms? yes.  Work related: yes  Automobile related injury: no.    Objective and Assessment:    Posture Analysis:   High shoulder: right.  Head tilt: right.  High iliac crest: .  Head carriage: forward.  Thoracic Kyphosis: neutral.  Lumbar Lordosis: neutral.    Lumbar Range of Motion: extension decreased.  Cervical Range of Motion: .  Thoracic Range of Motion: extension decreased.  Extremity Range of Motion: .    Palpation:   Lev scapulae: stiff, referred pain: no   Subocc mM  Lumbar paraspinals  Segmental dysfunction pre-treatment:L5  T34.  C\2  C56    Assessment post-treatment:  Cervical: ROM increased.  Thoracic: ROM increased.  Lumbar: ROM increased.    Comments: .  Past left hip replacement      Complicating Factors: .    Plan / Procedure:    Expected release date: .  Treatment plan: 2 times 2 weeks.  Instructed patient: ice 20 minutes every other hour as needed.  Short term goals: reduce pain.  Long term goals: restore normal function.  Prognosis: excellent.

## 2020-10-06 ASSESSMENT — ANXIETY QUESTIONNAIRES: GAD7 TOTAL SCORE: 0

## 2020-10-06 NOTE — PROGRESS NOTES
Subjective Finding:    Chief compalint: Patient presents with:  Neck Pain: getting better.  ROm is better.  still feeling like she is slightly foggy   , Pain Scale: 8/10, Intensity: sharp, Duration: 10 days, Change since last visit: , Radiating: no.    Date of injury:     Activities that the pain restricts:   Home/household/hobbies/social activities: no.  Work duties: no.  Sleep: no.  Makes symptoms better: rest.  Makes symptoms worse: activity.  Have you seen anyone else for the symptoms? yes.  Work related: yes  Automobile related injury: no.    Objective and Assessment:    Posture Analysis:   High shoulder: right.  Head tilt: right.  High iliac crest: .  Head carriage: forward.  Thoracic Kyphosis: neutral.  Lumbar Lordosis: neutral.    Lumbar Range of Motion: extension decreased.  Cervical Range of Motion: .  Thoracic Range of Motion: extension decreased.  Extremity Range of Motion: .    Palpation:   Lev scapulae: stiff, referred pain: no   Subocc mM  Lumbar paraspinals  Segmental dysfunction pre-treatment:L5  T34.  C\2  C56    Assessment post-treatment:  Cervical: ROM increased.  Thoracic: ROM increased.  Lumbar: ROM increased.    Comments: .  Past left hip replacement      Complicating Factors: .    Plan / Procedure:    Expected release date: .  Treatment plan: 2 times 2 weeks.  Instructed patient: ice 20 minutes every other hour as needed.  Short term goals: reduce pain.  Long term goals: restore normal function.  Prognosis: excellent.

## 2020-10-08 ENCOUNTER — OFFICE VISIT (OUTPATIENT)
Dept: CHIROPRACTIC MEDICINE | Facility: OTHER | Age: 60
End: 2020-10-08
Attending: CHIROPRACTOR
Payer: OTHER MISCELLANEOUS

## 2020-10-08 DIAGNOSIS — M99.01 SEGMENTAL AND SOMATIC DYSFUNCTION OF CERVICAL REGION: Primary | ICD-10-CM

## 2020-10-08 DIAGNOSIS — M54.2 CERVICALGIA: ICD-10-CM

## 2020-10-08 DIAGNOSIS — M99.02 SEGMENTAL AND SOMATIC DYSFUNCTION OF THORACIC REGION: ICD-10-CM

## 2020-10-08 PROCEDURE — 98940 CHIROPRACT MANJ 1-2 REGIONS: CPT | Mod: AT | Performed by: CHIROPRACTOR

## 2020-10-08 NOTE — PROGRESS NOTES
Subjective Finding:    Chief compalint: Patient presents with:  Back Pain: upper back pain  Neck Pain  , Pain Scale: 8/10, Intensity: sharp, Duration: 10 days, Change since last visit: , Radiating: no.    Date of injury:     Activities that the pain restricts:   Home/household/hobbies/social activities: no.  Work duties: no.  Sleep: no.  Makes symptoms better: rest.  Makes symptoms worse: activity.  Have you seen anyone else for the symptoms? yes.  Work related: yes  Automobile related injury: no.    Objective and Assessment:    Posture Analysis:   High shoulder: right.  Head tilt: right.  High iliac crest: .  Head carriage: forward.  Thoracic Kyphosis: neutral.  Lumbar Lordosis: neutral.    Lumbar Range of Motion: extension decreased.  Cervical Range of Motion: .  Thoracic Range of Motion: extension decreased.  Extremity Range of Motion: .    Palpation:   Lev scapulae: stiff, referred pain: no   Subocc mM    Segmental dysfunction pre-treatment  T34.  C\2  C56    Assessment post-treatment:  Cervical: ROM increased.  Thoracic: ROM increased.  Lumbar: ROM increased.    Comments: .  Past left hip replacement      Complicating Factors: .    Plan / Procedure:    Expected release date: .  Treatment plan: 2 times 2 weeks.  Instructed patient: ice 20 minutes every other hour as needed.  Short term goals: reduce pain.  Long term goals: restore normal function.  Prognosis: excellent.

## 2020-10-15 NOTE — PROGRESS NOTES
"Occupational Visit     SUBJECTIVE:  Estella ORO Aas, 60 year old, female is seen for follow up of occupational injury. Date of injury is 9/17/20.    Linked Episodes   Type: Episode: Status: Noted: Resolved: Last update: Updated by:   WORK COMP Head Injury Active 9/17/2020  10/19/2020  9:51 AM Yajaira Ely LPN      Comments:       Head Injury      Duration: since 9/17/20    Description (location/character/radiation): hit shouders and neck then head    Intensity:  mild    Accompanying signs and symptoms: slight headache    History (similar episodes/previous evaluation): none previously to injury    Precipitating or alleviating factors: None    Therapies tried and outcome: None         Allergies   Allergen Reactions     Cats      \"Cat/feline product derivatives\"         Review of Systems:  Constitutional, HEENT, cardiovascular, pulmonary, gi and gu systems are negative, except as otherwise noted.      OBJECTIVE:  Vitals:    10/19/20 0953   BP: 108/68   Pulse: 76   Temp: 98.8  F (37.1  C)   SpO2: 97%               Exam:  GENERAL:: healthy, alert and no distress  HENT: ear canals- normal; TMs- normal; Nose- normal; Mouth- no ulcers, no lesions  NECK: no tenderness, no adenopathy, no asymmetry, no masses, no stiffness; thyroid- normal to palpation  RESP: lungs clear to auscultation - no rales, no rhonchi, no wheezes  CV: regular rates and rhythm, normal S1 S2, no S3 or S4 and no murmur, no click or rub -  MS: extremities- no gross deformities noted, no edema  SKIN: no suspicious lesions, no rashes  BACK: no CVA tenderness, no paralumbar tenderness  LYMPHATICS: ant. cervical- normal, post. cervical- normal, axillary- normal, supraclavicular- normal, inguinal- normal  Neuro: PERRLA and has good balance  Psyche: mood is down and focus is better. On and off headaches remain.  Focus is ok. Trying to cook meals and doing ok on that.     Labs: No results found for this or any previous visit (from the past 24 " hour(s)).      ASSESSMENT/PLAN:  1. Fall, subsequent encounter  At work fell from a ceiling support. Neck has better movement and her upper back.     2. Closed head injury, subsequent encounter  She is better in thought and cognition. Kelly to make a dinner and able to do things in succession. Still has headaches and is tired easily. Note today she is wanting to get back to work.     3. Neck pain  Has been doing home exercises. Ice and motrin have been helping still having spasm.

## 2020-10-19 ENCOUNTER — OFFICE VISIT (OUTPATIENT)
Dept: FAMILY MEDICINE | Facility: OTHER | Age: 60
End: 2020-10-19
Attending: PHYSICIAN ASSISTANT
Payer: OTHER MISCELLANEOUS

## 2020-10-19 VITALS
SYSTOLIC BLOOD PRESSURE: 108 MMHG | WEIGHT: 153.2 LBS | DIASTOLIC BLOOD PRESSURE: 68 MMHG | TEMPERATURE: 98.8 F | HEIGHT: 61 IN | OXYGEN SATURATION: 97 % | BODY MASS INDEX: 28.92 KG/M2 | HEART RATE: 76 BPM

## 2020-10-19 DIAGNOSIS — S09.90XD CLOSED HEAD INJURY, SUBSEQUENT ENCOUNTER: ICD-10-CM

## 2020-10-19 DIAGNOSIS — M54.2 NECK PAIN: ICD-10-CM
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
## 2020-10-19 PROCEDURE — 99213 OFFICE O/P EST LOW 20 MIN: CPT | Performed by: PHYSICIAN ASSISTANT

## 2020-10-19 ASSESSMENT — ANXIETY QUESTIONNAIRES
5. BEING SO RESTLESS THAT IT IS HARD TO SIT STILL: NOT AT ALL
1. FEELING NERVOUS, ANXIOUS, OR ON EDGE: NOT AT ALL
3. WORRYING TOO MUCH ABOUT DIFFERENT THINGS: NOT AT ALL
GAD7 TOTAL SCORE: 0
7. FEELING AFRAID AS IF SOMETHING AWFUL MIGHT HAPPEN: NOT AT ALL
4. TROUBLE RELAXING: NOT AT ALL
2. NOT BEING ABLE TO STOP OR CONTROL WORRYING: NOT AT ALL
6. BECOMING EASILY ANNOYED OR IRRITABLE: NOT AT ALL

## 2020-10-19 ASSESSMENT — PATIENT HEALTH QUESTIONNAIRE - PHQ9: SUM OF ALL RESPONSES TO PHQ QUESTIONS 1-9: 0

## 2020-10-19 ASSESSMENT — MIFFLIN-ST. JEOR: SCORE: 1203.64

## 2020-10-19 NOTE — NURSING NOTE
"Chief Complaint   Patient presents with     Work Comp     head injury       Initial /68 (BP Location: Left arm, Patient Position: Sitting, Cuff Size: Adult Regular)   Pulse 76   Temp 98.8  F (37.1  C) (Tympanic)   Ht 1.56 m (5' 1.4\")   Wt 69.5 kg (153 lb 3.2 oz)   SpO2 97%   BMI 28.57 kg/m   Estimated body mass index is 28.57 kg/m  as calculated from the following:    Height as of this encounter: 1.56 m (5' 1.4\").    Weight as of this encounter: 69.5 kg (153 lb 3.2 oz).  Medication Reconciliation: complete  Yajaira Chacon LPN  "

## 2020-10-19 NOTE — LETTER
[facility] - [place]  [address] [place] [address]  Phone: [phone]    October 19, 2020        Estella ORO Aas  [address] [place] [address]          To whom it may concern:    RE: Estella ORO Aas    Patient was seen and treated today at our clinic and missed work. We are going to send her back to work on a work hardening program.  4 hours per day for the next month.  We will see us     Please contact me for questions or concerns.      Sincerely,        FELIX Joe

## 2020-10-19 NOTE — PATIENT INSTRUCTIONS
Thank you for choosing [facility].   I have office hours 8:00 am to 4:30 pm on Monday's, Wednesday's, Thursday's and Friday's. My nurse and I are out of the office every Tuesday.    Following your visit, when your labs and diagnostic testing have returned, I will review then and you will be contacted by my nurse.  If you are on My Chart, you can also view results there.    For refills, notify your pharmacy regarding what you need and the pharmacy will generate a refill request. Do not call my nurse as she is unable to process refill request. Please plan ahead and allow 3-5 days for refill requests.    You will generally receive a reminder call the day prior to your appointment.  If you cannot attend your appointment, please cancel your appointment with as much notice as possible.  If there is a pattern of failure to present for your appointments, I cannot provide consistent, meaningful, ongoing care for you. It is very important to me that you come in for your care, so we can best assist you with your health care needs.    IMPORTANT:  Please note that it is my standard of practice to NOT participate in prescribing ongoing requested Narcotic Analgesic therapy, and/or participate in the prescribing of other controlled substances.  My nurse and I am happy to assist you with the process of referral for alternative pain management as needed, and other treatment modalities including but not limited to:  Physical Therapy, Physical Medicine and Rehab, Counseling, Chiropractic Care, Orthopedic Care, and non-narcotic medication management.     In the event that you need to be seen for emergent concerns and I am out of office,  please see one of my colleagues for acute concerns.  You may also present to  or ER.  I appreciate the opportunity to serve you and look forward to supporting your healthcare needs in the future. Please contact me with any questions or concerns that you may  have.    Sincerely,      Mia Villagomez RN, PA-C

## 2020-10-20 ASSESSMENT — ANXIETY QUESTIONNAIRES: GAD7 TOTAL SCORE: 0

## 2020-11-18 NOTE — PROGRESS NOTES
"Subjective     Estella ORO Aas is a 60 year old female who presents to clinic today for the following health issues:    HPI       Fall follow up       Duration: ***    Description (location/character/radiation): ***    Intensity:  {mild,moderate,severe:399505}    Accompanying signs and symptoms: ***    History (similar episodes/previous evaluation): {.:141684::\"None\"}    Precipitating or alleviating factors: {.:135784::\"None\"}    Therapies tried and outcome: {.:611148::\"None\"}           {SUPERLIST (Optional):162403}  {additonal problems for provider to add (Optional):886788}    Review of Systems   {ROS COMP (Optional):056258}      Objective    There were no vitals taken for this visit.  There is no height or weight on file to calculate BMI.  Physical Exam   {Exam List (Optional):381864}    {Diagnostic Test Results (Optional):590749}        {PROVIDER CHARTING PREFERENCE:224190}    "

## 2020-11-20 ENCOUNTER — OFFICE VISIT (OUTPATIENT)
Dept: FAMILY MEDICINE | Facility: OTHER | Age: 60
End: 2020-11-20
Attending: PHYSICIAN ASSISTANT
Payer: OTHER MISCELLANEOUS

## 2020-11-20 VITALS
WEIGHT: 153.2 LBS | SYSTOLIC BLOOD PRESSURE: 108 MMHG | TEMPERATURE: 97.4 F | OXYGEN SATURATION: 98 % | BODY MASS INDEX: 28.92 KG/M2 | DIASTOLIC BLOOD PRESSURE: 72 MMHG | HEIGHT: 61 IN | HEART RATE: 89 BPM

## 2020-11-20 DIAGNOSIS — S09.90XD CLOSED HEAD INJURY, SUBSEQUENT ENCOUNTER: Primary | ICD-10-CM

## 2020-11-20 DIAGNOSIS — F41.1 GAD (GENERALIZED ANXIETY DISORDER): ICD-10-CM

## 2020-11-20 DIAGNOSIS — L50.1 CHRONIC IDIOPATHIC URTICARIA: ICD-10-CM

## 2020-11-20 PROCEDURE — 99213 OFFICE O/P EST LOW 20 MIN: CPT | Performed by: PHYSICIAN ASSISTANT

## 2020-11-20 RX ORDER — ESCITALOPRAM OXALATE 20 MG/1
20 TABLET ORAL DAILY
Qty: 90 TABLET | Refills: 1 | Status: SHIPPED | OUTPATIENT
Start: 2020-11-20 | End: 2021-07-06

## 2020-11-20 RX ORDER — HYDROXYZINE HYDROCHLORIDE 25 MG/1
TABLET, FILM COATED ORAL
Qty: 60 TABLET | Refills: 1 | Status: SHIPPED | OUTPATIENT
Start: 2020-11-20 | End: 2021-11-05

## 2020-11-20 ASSESSMENT — MIFFLIN-ST. JEOR: SCORE: 1203.64

## 2020-11-20 ASSESSMENT — PAIN SCALES - GENERAL: PAINLEVEL: NO PAIN (0)

## 2020-11-20 NOTE — PATIENT INSTRUCTIONS
Thank you for choosing [facility].   I have office hours 8:00 am to 4:30 pm on Monday's, Wednesday's, Thursday's and Friday's. My nurse and I are out of the office every Tuesday.    Following your visit, when your labs and diagnostic testing have returned, I will review then and you will be contacted by my nurse.  If you are on My Chart, you can also view results there.    For refills, notify your pharmacy regarding what you need and the pharmacy will generate a refill request. Do not call my nurse as she is unable to process refill request. Please plan ahead and allow 3-5 days for refill requests.    You will generally receive a reminder call the day prior to your appointment.  If you cannot attend your appointment, please cancel your appointment with as much notice as possible.  If there is a pattern of failure to present for your appointments, I cannot provide consistent, meaningful, ongoing care for you. It is very important to me that you come in for your care, so we can best assist you with your health care needs.    IMPORTANT:  Please note that it is my standard of practice to NOT participate in prescribing ongoing requested Narcotic Analgesic therapy, and/or participate in the prescribing of other controlled substances.  My nurse and I am happy to assist you with the process of referral for alternative pain management as needed, and other treatment modalities including but not limited to:  Physical Therapy, Physical Medicine and Rehab, Counseling, Chiropractic Care, Orthopedic Care, and non-narcotic medication management.     In the event that you need to be seen for emergent concerns and I am out of office,  please see one of my colleagues for acute concerns.  You may also present to  or ER.  I appreciate the opportunity to serve you and look forward to supporting your healthcare needs in the future. Please contact me with any questions or concerns that you may  have.    Sincerely,      Mia Villagomez RN, PA-C

## 2020-11-20 NOTE — PROGRESS NOTES
"Occupational Visit     SUBJECTIVE:  Estella ORO Aas, 60 year old, female is seen for follow up of occupational injury. Date of injury is 9/17/20.    Linked Episodes   Type: Episode: Status: Noted: Resolved: Last update: Updated by:   WORK COMP Head Injury Active 9/17/2020 11/20/2020  1:53 PM Yajaira Ely LPN      Comments:       Head Injury      Duration: since 9/17/20    Description (location/character/radiation): head injury    Intensity:  mild    Accompanying signs and symptoms: back to work part time    History (similar episodes/previous evaluation): None    Precipitating or alleviating factors: None    Therapies tried and outcome: has occasionally headache but feels fine overall          Allergies   Allergen Reactions     Cats      \"Cat/feline product derivatives\"         Review of Systems:  Constitutional, HEENT, cardiovascular, pulmonary, gi and gu systems are negative, except as otherwise noted.      OBJECTIVE:  Mild dizziness rarely.     Exam:  GENERAL: healthy, alert, well nourished, well hydrated, no distress  MS: extremities- no gross deformities noted, no edema      Labs: No results found for this or any previous visit (from the past 24 hour(s)).      ASSESSMENT/PLAN:      1. Closed head injury, subsequent encounter  Still having trouble sleeping.  Motivation is low still. Focus is ok. This is the point I think she can get back to work. She will let me know if any further trouble.   - hydrOXYzine (ATARAX) 25 MG tablet; Take one to two pills one hour before bed.  Dispense: 60 tablet; Refill: 1    "

## 2020-11-20 NOTE — NURSING NOTE
"Chief Complaint   Patient presents with     Work Comp       Initial /72 (BP Location: Left arm, Patient Position: Sitting, Cuff Size: Adult Regular)   Pulse 89   Temp 97.4  F (36.3  C) (Tympanic)   Ht 1.56 m (5' 1.4\")   Wt 69.5 kg (153 lb 3.2 oz)   SpO2 98%   BMI 28.57 kg/m   Estimated body mass index is 28.57 kg/m  as calculated from the following:    Height as of this encounter: 1.56 m (5' 1.4\").    Weight as of this encounter: 69.5 kg (153 lb 3.2 oz).  Medication Reconciliation: complete  Yajaira Chacon LPN  "

## 2020-12-20 ENCOUNTER — HEALTH MAINTENANCE LETTER (OUTPATIENT)
Age: 60
End: 2020-12-20

## 2021-01-08 ENCOUNTER — OFFICE VISIT (OUTPATIENT)
Dept: CHIROPRACTIC MEDICINE | Facility: OTHER | Age: 61
End: 2021-01-08
Attending: CHIROPRACTOR
Payer: COMMERCIAL

## 2021-01-08 DIAGNOSIS — M54.2 CERVICALGIA: ICD-10-CM

## 2021-01-08 DIAGNOSIS — M99.01 SEGMENTAL AND SOMATIC DYSFUNCTION OF CERVICAL REGION: Primary | ICD-10-CM

## 2021-01-08 DIAGNOSIS — M99.02 SEGMENTAL AND SOMATIC DYSFUNCTION OF THORACIC REGION: ICD-10-CM

## 2021-01-08 PROCEDURE — 98940 CHIROPRACT MANJ 1-2 REGIONS: CPT | Mod: AT | Performed by: CHIROPRACTOR

## 2021-01-12 NOTE — PROGRESS NOTES
Subjective Finding:    Chief compalint: Patient presents with:  Neck Pain  , Pain Scale: 4/10, Intensity: sharp, Duration: 3 days, Change since last visit: , Radiating: no.    Date of injury:     Activities that the pain restricts:   Home/household/hobbies/social activities: no.  Work duties: no.  Sleep: no.  Makes symptoms better: rest.  Makes symptoms worse: activity.  Have you seen anyone else for the symptoms? no.  Work related: no  Automobile related injury: no.    Objective and Assessment:    Posture Analysis:   High shoulder: right.  Head tilt: right.  High iliac crest: .  Head carriage: forward.  Thoracic Kyphosis: neutral.  Lumbar Lordosis: neutral.    Lumbar Range of Motion: extension decreased.  Cervical Range of Motion: .  Thoracic Range of Motion: extension decreased.  Extremity Range of Motion: .    Palpation:   Lev scapulae: stiff, referred pain: no   Subocc mM    Segmental dysfunction pre-treatment  T34.  C\2  C56    Assessment post-treatment:  Cervical: ROM increased.  Thoracic: ROM increased.  Lumbar: ROM increased.    Comments: .  Past left hip replacement      Complicating Factors: .    Plan / Procedure:    Expected release date: .  Treatment plan: 2 times 2 weeks.  Instructed patient: ice 20 minutes every other hour as needed.  Short term goals: reduce pain.  Long term goals: restore normal function.  Prognosis: excellent.

## 2021-05-14 ENCOUNTER — OFFICE VISIT (OUTPATIENT)
Dept: CHIROPRACTIC MEDICINE | Facility: OTHER | Age: 61
End: 2021-05-14
Attending: CHIROPRACTOR
Payer: COMMERCIAL

## 2021-05-14 DIAGNOSIS — M99.02 SEGMENTAL AND SOMATIC DYSFUNCTION OF THORACIC REGION: ICD-10-CM

## 2021-05-14 DIAGNOSIS — M99.01 SEGMENTAL AND SOMATIC DYSFUNCTION OF CERVICAL REGION: ICD-10-CM

## 2021-05-14 DIAGNOSIS — M99.03 SEGMENTAL AND SOMATIC DYSFUNCTION OF LUMBAR REGION: Primary | ICD-10-CM

## 2021-05-14 DIAGNOSIS — M54.50 ACUTE BILATERAL LOW BACK PAIN WITHOUT SCIATICA: ICD-10-CM

## 2021-05-14 PROCEDURE — 98941 CHIROPRACT MANJ 3-4 REGIONS: CPT | Mod: AT | Performed by: CHIROPRACTOR

## 2021-05-17 ENCOUNTER — TELEPHONE (OUTPATIENT)
Dept: FAMILY MEDICINE | Facility: OTHER | Age: 61
End: 2021-05-17

## 2021-05-17 NOTE — TELEPHONE ENCOUNTER
10:45 AM    Reason for Call: Phone Call    Description:R knee pain and hard time bending would like to get worked into schedule / please call pt    Was an appointment offered for this call? No  If yes : Appointment type              Date    Preferred method for responding to this message: Telephone Call  What is your phone number ? [phone]    If we cannot reach you directly, may we leave a detailed response at the number you provided? Yes    Can this message wait until your PCP/provider returns, if available today? YES, No, provider is in    Steffany Pereira

## 2021-05-18 NOTE — PROGRESS NOTES
Nisha Tineo is a 60 year old who presents for the following health issues     HPI     Musculoskeletal problem/pain  Onset/Duration: Week  Description  Location: knee - right  Joint Swelling: no  Redness: no  Pain: YES- 5/10  Warmth: no  Intensity:  moderate  Progression of Symptoms:  worsening  Accompanying signs and symptoms:   Fevers: no  Numbness/tingling/weakness: no  History  Trauma to the area: no  Recent illness:  no  Previous similar problem: no  Previous evaluation:  no  Precipitating or alleviating factors:  Aggravating factors include: walking, climbing stairs and exercise  Therapies tried and outcome: rest/inactivity and NSAID - ALlieve          Review of Systems   Constitutional, HEENT, cardiovascular, pulmonary, gi and gu systems are negative, except as otherwise noted.      Objective    /70   Pulse 70   Temp 97.6  F (36.4  C)   Resp 18   Wt 68 kg (150 lb)   SpO2 97%   BMI 27.97 kg/m    Body mass index is 27.97 kg/m .  Physical Exam   GENERAL: healthy, alert and no distress  EYES: Eyes grossly normal to inspection, PERRL and conjunctivae and sclerae normal  HENT: ear canals and TM's normal, nose and mouth without ulcers or lesions  NECK: no adenopathy, no asymmetry, masses, or scars and thyroid normal to palpation  RESP: lungs clear to auscultation - no rales, rhonchi or wheezes  MS: swelling is present in right knee. Pain on lateral ligaments and showing pain in hyperflexion.   SKIN: no suspicious lesions or rashes    Xray - Reviewed and interpreted by me.  normal        1. Acute pain of right knee  She is going to be given an xray and also give her some Pt.   Taking NSAID. Will  a brace over the counter.  If not better we will get MRI.  She will let me know in 4 weeks.   - XR Knee Right 3 Views (Clinic Performed); Future  - PHYSICAL THERAPY REFERRAL

## 2021-05-19 ENCOUNTER — ANCILLARY PROCEDURE (OUTPATIENT)
Dept: GENERAL RADIOLOGY | Facility: OTHER | Age: 61
End: 2021-05-19
Attending: PHYSICIAN ASSISTANT
Payer: COMMERCIAL

## 2021-05-19 ENCOUNTER — OFFICE VISIT (OUTPATIENT)
Dept: FAMILY MEDICINE | Facility: OTHER | Age: 61
End: 2021-05-19
Attending: PHYSICIAN ASSISTANT
Payer: COMMERCIAL

## 2021-05-19 VITALS
DIASTOLIC BLOOD PRESSURE: 70 MMHG | HEART RATE: 70 BPM | OXYGEN SATURATION: 97 % | SYSTOLIC BLOOD PRESSURE: 102 MMHG | TEMPERATURE: 97.6 F | BODY MASS INDEX: 27.97 KG/M2 | WEIGHT: 150 LBS | RESPIRATION RATE: 18 BRPM

## 2021-05-19 DIAGNOSIS — M25.561 ACUTE PAIN OF RIGHT KNEE: Primary | ICD-10-CM

## 2021-05-19 DIAGNOSIS — M25.561 ACUTE PAIN OF RIGHT KNEE: ICD-10-CM

## 2021-05-19 PROCEDURE — 73562 X-RAY EXAM OF KNEE 3: CPT | Mod: TC | Performed by: RADIOLOGY

## 2021-05-19 PROCEDURE — 99213 OFFICE O/P EST LOW 20 MIN: CPT | Performed by: PHYSICIAN ASSISTANT

## 2021-05-19 ASSESSMENT — ANXIETY QUESTIONNAIRES
GAD7 TOTAL SCORE: 0
5. BEING SO RESTLESS THAT IT IS HARD TO SIT STILL: NOT AT ALL
4. TROUBLE RELAXING: NOT AT ALL
1. FEELING NERVOUS, ANXIOUS, OR ON EDGE: NOT AT ALL
IF YOU CHECKED OFF ANY PROBLEMS ON THIS QUESTIONNAIRE, HOW DIFFICULT HAVE THESE PROBLEMS MADE IT FOR YOU TO DO YOUR WORK, TAKE CARE OF THINGS AT HOME, OR GET ALONG WITH OTHER PEOPLE: SOMEWHAT DIFFICULT
3. WORRYING TOO MUCH ABOUT DIFFERENT THINGS: NOT AT ALL
2. NOT BEING ABLE TO STOP OR CONTROL WORRYING: NOT AT ALL
7. FEELING AFRAID AS IF SOMETHING AWFUL MIGHT HAPPEN: NOT AT ALL
6. BECOMING EASILY ANNOYED OR IRRITABLE: NOT AT ALL

## 2021-05-19 ASSESSMENT — PAIN SCALES - GENERAL: PAINLEVEL: MODERATE PAIN (5)

## 2021-05-19 ASSESSMENT — PATIENT HEALTH QUESTIONNAIRE - PHQ9: SUM OF ALL RESPONSES TO PHQ QUESTIONS 1-9: 4

## 2021-05-19 NOTE — PATIENT INSTRUCTIONS
Patient Education     Knee Pain  Knee pain is very common. It s especially common in active people who put a lot of pressure on their knees, like runners. It affects women more often than men.  Your kneecap (patella) is a thick, round bone. It covers and protects the front portion of your knee joint. It moves along a groove in your thighbone (femur) as part of the patellofemoral joint. A layer of cartilage surrounds the underside of your kneecap. This layer protects it from grinding against your femur.  When this cartilage softens and breaks down, it can cause knee pain. This is partly because of repetitive stress. The stress irritates the lining of the joint. This causes pain in the underlying bone.  What causes knee pain?  Many things can cause knee pain. You may have more than one cause. Some of these include:    Overuse of the knee joint    The kneecap doesn t line up with the tissue around it    Damage to small nerves in the area    Damage to the ligament-like structure that holds the kneecap in place (retinaculum)    Breakdown of the bone under the cartilage    Swelling in the soft tissues around the kneecap    Injury  You might be more likely to have knee pain if you:    Exercise a lot    Recently increased the intensity of your workouts    Have a body mass index (BMI) greater than 25    Have poor alignment of your kneecap    Walk with your feet turned overly outward or inward    Have weakness in surrounding muscle groups (inner quad or hip adductor muscles)    Have too much tightness in surrounding muscle groups (hamstrings or iliotibial band)    Have a recent history of injury to the area    Are female  Symptoms of knee pain  This type of knee pain is a dull, aching pain in the front of the knee in the area under and around the kneecap. This pain may start quickly or slowly. Your pain might be worse when you squat, run, or sit for a long time. Climbing stairs may be painful or hard to do. You might also  sometimes feel like your knee is giving out. You may have symptoms in one or both of your knees.  Diagnosing knee pain  Your healthcare provider will ask about your medical history and your symptoms. Be sure to describe any activities that make your knee pain worse. He or she will look at your knee. This will include tests of your range of motion, strength, and areas of pain of your knee. Your knee alignment will be checked.  Your healthcare provider will need to rule out other causes of your knee pain, such as arthritis. You may need an imaging test, such as an X-ray or MRI.  Treatment for knee pain  Treatments that can help ease your symptoms may include:    Avoiding activities for a while that make your pain worse, returning to activity over time    Icing the outside of your knee when it causes you pain    Taking over-the-counter pain medicine such as NSAIDs    Wearing a knee brace or taping your knee to support it    Compression to help prevent swelling    Wearing special shoe inserts to help keep your feet in the proper alignment    Elevating your knee    Doing special exercises to stretch and strengthen the muscles around your hip and your knee  These steps help most people manage knee pain. But some cases of knee pain need to be treated with surgery. You rarely need surgery right away. You may need it later if other treatments don t work. Your healthcare provider may refer you to an orthopedic surgeon. He or she will talk with you about your choices.  Preventing knee pain  Losing weight and correcting excess muscle tightness or muscle weakness may help lower your risk.  In some cases, you can prevent knee pain. To help prevent a flare-up of knee pain, do these things:    Regularly do all the exercises your doctor or physical therapist advises    Warm up fully before exercising    Support your knee as advised by your doctor or physical therapist    Increase training gradually, and ease up on training when  needed    Have an expert check your gait for running or other sporting activities    Stretch properly before and after exercise    Replace your running shoes regularly    Lose excess weight  When to call your healthcare provider  Call your healthcare provider right away if:    Your symptoms don t get better after a few weeks of treatment    You have any new symptoms  [employer] last reviewed this educational content on 6/1/2019 2000-2021 [employer]. All rights reserved. This information is not intended as a substitute for professional medical care. Always follow your healthcare professional's instructions.

## 2021-05-19 NOTE — NURSING NOTE
"Chief Complaint   Patient presents with     Musculoskeletal Problem       Initial /70   Pulse 70   Temp 97.6  F (36.4  C)   Resp 18   Wt 68 kg (150 lb)   SpO2 97%   BMI 27.97 kg/m   Estimated body mass index is 27.97 kg/m  as calculated from the following:    Height as of 11/20/20: 1.56 m (5' 1.4\").    Weight as of this encounter: 68 kg (150 lb).  Medication Reconciliation: dara Motta  "

## 2021-05-20 ENCOUNTER — TELEPHONE (OUTPATIENT)
Dept: FAMILY MEDICINE | Facility: OTHER | Age: 61
End: 2021-05-20

## 2021-05-20 ASSESSMENT — ANXIETY QUESTIONNAIRES: GAD7 TOTAL SCORE: 0

## 2021-05-20 NOTE — PROGRESS NOTES
Subjective Finding:    Chief compalint: Patient presents with:  Back Pain  Neck Pain  , Pain Scale: 4/10, Intensity: sharp, Duration: 3 days, Change since last visit: , Radiating: no.    Date of injury:     Activities that the pain restricts:   Home/household/hobbies/social activities: no.  Work duties: no.  Sleep: no.  Makes symptoms better: rest.  Makes symptoms worse: activity.  Have you seen anyone else for the symptoms? no.  Work related: no  Automobile related injury: no.    Objective and Assessment:    Posture Analysis:   High shoulder: right.  Head tilt: right.  High iliac crest: .  Head carriage: forward.  Thoracic Kyphosis: neutral.  Lumbar Lordosis: neutral.    Lumbar Range of Motion: extension decreased.  Cervical Range of Motion: .  Thoracic Range of Motion: extension decreased.  Extremity Range of Motion: .    Palpation:   Lev scapulae: stiff, referred pain: no   Subocc mM    Segmental dysfunction pre-treatment  T34.  C\2  C56    Assessment post-treatment:  Cervical: ROM increased.  Thoracic: ROM increased.  Lumbar: ROM increased.    Comments: .  Past left hip replacement      Complicating Factors: .    Plan / Procedure:    Expected release date: .  Treatment plan: 2 times 2 weeks.  Instructed patient: ice 20 minutes every other hour as needed.  Short term goals: reduce pain.  Long term goals: restore normal function.  Prognosis: excellent.

## 2021-06-17 NOTE — PROGRESS NOTES
Subjective     Estella ORO Aas is a 60 year old female who presents to clinic today for the following health issues:    HPI         Musculoskeletal problem/pain  Onset/Duration: Month   Description  Location: knee - left  Joint Swelling: no  Redness: no  Pain: YES- 10/10  Warmth: no  Intensity:  moderate, severe  Progression of Symptoms:  worsening and intermittent  Accompanying signs and symptoms:   Fevers: no  Numbness/tingling/weakness: Baby toe right foot   History  Trauma to the area: no  Recent illness:  no  Previous similar problem: YES- Bone spur right knee  Previous evaluation:  YES- 5/19/2021   Precipitating or alleviating factors:  Aggravating factors include: standing, walking, climbing stairs, lifting and overuse  Therapies tried and outcome: rest/inactivity, immobilization and lidocaine patches, allieve         Patient Active Problem List   Diagnosis     Advanced directives, counseling/discussion     RADHA (generalized anxiety disorder)     Chronic idiopathic urticaria     Special screening for malignant neoplasms, colon     Significant closed head trauma within past 3 months     Acute recurrent maxillary sinusitis     Fall, subsequent encounter     Closed head injury, subsequent encounter     Past Surgical History:   Procedure Laterality Date     AS TOTAL HIP ARTHROPLASTY Left 03/02/2017     basilar joint injection, left thumb  03/02/2017     C TOTAL HIP ARTHROPLASTY Left 03/02/2017     COLONOSCOPY  5/20/2006     COLONOSCOPY N/A 6/18/2020    Procedure: COLONOSCOPY;  Surgeon: Bradly Jauregui MD;  Location: [facility]     hand  20009    RT     HYSTERECTOMY TOTAL ABDOMINAL N/A 2000    Cervix removed. Ovaries remain.        Social History     Tobacco Use     Smoking status: Current Every Day Smoker     Packs/day: 0.50     Types: Cigarettes     Last attempt to quit: 10/1/2017     Years since quitting: 3.7     Smokeless tobacco: Never Used     Tobacco comment: Quit 1990; no passive exposure   Substance Use  "Topics     Alcohol use: Yes     Comment: Socially     Family History   Problem Relation Age of Onset     Other - See Comments Father         Alzheimer's Disease     C.A.D. Father      Hypertension Mother          Current Outpatient Medications   Medication Sig Dispense Refill     cholecalciferol (VITAMIN D3) 5000 UNITS TABS tablet Take 10,000 Units by mouth daily       escitalopram (LEXAPRO) 20 MG tablet Take 1 tablet (20 mg) by mouth daily 90 tablet 1     calcium citrate (CITRACAL) 950 MG tablet Take 1 tablet by mouth 2 times daily       cyclobenzaprine (FLEXERIL) 10 MG tablet Take 1 tablet (10 mg) by mouth 2 times daily as needed for muscle spasms (Patient not taking: Reported on 6/21/2021) 60 tablet 1     dextromethorphan-guaiFENesin (MUCINEX DM)  MG 12 hr tablet Take 1 tablet by mouth every 12 hours (Patient not taking: Reported on 6/21/2021) 30 tablet 1     hydrOXYzine (ATARAX) 25 MG tablet Take one to two pills one hour before bed. (Patient not taking: Reported on 6/21/2021) 60 tablet 1     Allergies   Allergen Reactions     Cats      \"Cat/feline product derivatives\"     Recent Labs   Lab Test 06/12/19  1220 02/23/17  1413 01/20/16  0848 07/21/14  1446 07/21/14  1446 05/20/13  0832   *  --   --   --   --  98   HDL 72  --   --   --   --  61*   TRIG 127  --   --   --   --  81   ALT 17 20 18  --   --   --    CR 0.65 0.64 0.71   < >  --   --    GFRESTIMATED >90 >90  Non  GFR Calc   85   < >  --   --    GFRESTBLACK >90 >90   GFR Calc   >90   GFR Calc     < >  --   --    POTASSIUM 3.8 4.0 4.1   < >  --   --    TSH  --   --   --   --  1.32  --     < > = values in this interval not displayed.      BP Readings from Last 3 Encounters:   06/21/21 124/80   05/19/21 102/70   11/20/20 108/72    Wt Readings from Last 3 Encounters:   06/21/21 70.7 kg (155 lb 12.8 oz)   05/19/21 68 kg (150 lb)   11/20/20 69.5 kg (153 lb 3.2 oz)                    Reviewed and updated " "as needed this visit by Provider                 Review of Systems   Constitutional, HEENT, cardiovascular, pulmonary, gi and gu systems are negative, except as otherwise noted.      Objective    /80   Pulse 65   Temp 97.4  F (36.3  C)   Resp 18   Ht 1.562 m (5' 1.5\")   Wt 70.7 kg (155 lb 12.8 oz)   SpO2 98%   BMI 28.96 kg/m    Body mass index is 28.96 kg/m .  Physical Exam   GENERAL: healthy, alert and no distress  EYES: Eyes grossly normal to inspection, PERRL and conjunctivae and sclerae normal  HENT: ear canals and TM's normal, nose and mouth without ulcers or lesions  NECK: no adenopathy, no asymmetry, masses, or scars and thyroid normal to palpation  RESP: lungs clear to auscultation - no rales, rhonchi or wheezes  CV: regular rate and rhythm, normal S1 S2, no S3 or S4, no murmur, click or rub, no peripheral edema and peripheral pulses strong  ABDOMEN: soft, nontender, no hepatosplenomegaly, no masses and bowel sounds normal  MS: left knee is very swollen and medial deformity very tender but not hot. Has been on going for a month.   SKIN: no suspicious lesions or rashes  NEURO: has a weak knee. Can't bear weight.     Diagnostic Test Results:  Labs reviewed in Epic  No results found for this or any previous visit (from the past 24 hour(s)).        Assessment & Plan     Acute pain of left knee  She is going to see Dr. Lima needing an injection at a minimum.    - XR Knee Left 3 Views (Clinic Performed); Future  - CRP, inflammation  - Uric acid  - Rheumatoid factor  - Anti Nuclear Rupa IgG by IFA with Reflex  - Erythrocyte sedimentation rate auto  - Lyme Disease Rupa with reflex to WB Serum  - Parasite stain  - Orthopedic  Referral; Future     Tobacco Cessation:   reports that she has been smoking cigarettes. She has been smoking about 0.50 packs per day. She has never used smokeless tobacco.  BMI:   Estimated body mass index is 28.96 kg/m  as calculated from the following:    Height as of " "this encounter: 1.562 m (5' 1.5\").    Weight as of this encounter: 70.7 kg (155 lb 12.8 oz).   Weight management plan noted, stable and monitoring         See Patient Instructions    No follow-ups on file.    FELIX Joe  [facility] [place]  "

## 2021-06-21 ENCOUNTER — OFFICE VISIT (OUTPATIENT)
Dept: FAMILY MEDICINE | Facility: OTHER | Age: 61
End: 2021-06-21
Attending: PHYSICIAN ASSISTANT
Payer: COMMERCIAL

## 2021-06-21 ENCOUNTER — ANCILLARY PROCEDURE (OUTPATIENT)
Dept: GENERAL RADIOLOGY | Facility: OTHER | Age: 61
End: 2021-06-21
Attending: PHYSICIAN ASSISTANT
Payer: COMMERCIAL

## 2021-06-21 VITALS
DIASTOLIC BLOOD PRESSURE: 80 MMHG | TEMPERATURE: 97.4 F | WEIGHT: 155.8 LBS | HEART RATE: 65 BPM | OXYGEN SATURATION: 98 % | BODY MASS INDEX: 28.67 KG/M2 | SYSTOLIC BLOOD PRESSURE: 124 MMHG | RESPIRATION RATE: 18 BRPM | HEIGHT: 62 IN

## 2021-06-21 DIAGNOSIS — M25.562 ACUTE PAIN OF LEFT KNEE: ICD-10-CM

## 2021-06-21 DIAGNOSIS — M25.562 ACUTE PAIN OF LEFT KNEE: Primary | ICD-10-CM

## 2021-06-21 LAB
CRP SERPL-MCNC: <2.9 MG/L (ref 0–8)
ERYTHROCYTE [SEDIMENTATION RATE] IN BLOOD BY WESTERGREN METHOD: 10 MM/H (ref 0–30)
URATE SERPL-MCNC: 3.1 MG/DL (ref 2.6–6)

## 2021-06-21 PROCEDURE — 73562 X-RAY EXAM OF KNEE 3: CPT | Mod: TC | Performed by: RADIOLOGY

## 2021-06-21 PROCEDURE — 87015 SPECIMEN INFECT AGNT CONCNTJ: CPT | Performed by: PHYSICIAN ASSISTANT

## 2021-06-21 PROCEDURE — 84550 ASSAY OF BLOOD/URIC ACID: CPT | Performed by: PHYSICIAN ASSISTANT

## 2021-06-21 PROCEDURE — 86431 RHEUMATOID FACTOR QUANT: CPT | Performed by: PHYSICIAN ASSISTANT

## 2021-06-21 PROCEDURE — 85652 RBC SED RATE AUTOMATED: CPT | Performed by: PHYSICIAN ASSISTANT

## 2021-06-21 PROCEDURE — 99213 OFFICE O/P EST LOW 20 MIN: CPT | Performed by: PHYSICIAN ASSISTANT

## 2021-06-21 PROCEDURE — 87207 SMEAR SPECIAL STAIN: CPT | Mod: TC | Performed by: PHYSICIAN ASSISTANT

## 2021-06-21 PROCEDURE — 86618 LYME DISEASE ANTIBODY: CPT | Performed by: PHYSICIAN ASSISTANT

## 2021-06-21 PROCEDURE — 36415 COLL VENOUS BLD VENIPUNCTURE: CPT | Performed by: PHYSICIAN ASSISTANT

## 2021-06-21 PROCEDURE — 86140 C-REACTIVE PROTEIN: CPT | Performed by: PHYSICIAN ASSISTANT

## 2021-06-21 PROCEDURE — 86038 ANTINUCLEAR ANTIBODIES: CPT | Performed by: PHYSICIAN ASSISTANT

## 2021-06-21 RX ORDER — HYDROCODONE BITARTRATE AND ACETAMINOPHEN 5; 325 MG/1; MG/1
1 TABLET ORAL EVERY 6 HOURS PRN
Qty: 18 TABLET | Refills: 0 | Status: SHIPPED | OUTPATIENT
Start: 2021-06-21 | End: 2021-06-24

## 2021-06-21 ASSESSMENT — MIFFLIN-ST. JEOR: SCORE: 1217.01

## 2021-06-21 ASSESSMENT — PAIN SCALES - GENERAL: PAINLEVEL: MODERATE PAIN (5)

## 2021-06-21 NOTE — NURSING NOTE
"Chief Complaint   Patient presents with     Musculoskeletal Problem       Initial /80   Pulse 65   Temp 97.4  F (36.3  C)   Resp 18   Ht 1.562 m (5' 1.5\")   Wt 70.7 kg (155 lb 12.8 oz)   SpO2 98%   BMI 28.96 kg/m   Estimated body mass index is 28.96 kg/m  as calculated from the following:    Height as of this encounter: 1.562 m (5' 1.5\").    Weight as of this encounter: 70.7 kg (155 lb 12.8 oz).  Medication Reconciliation: dara Motta  "

## 2021-06-22 LAB
ANA SER QL IF: NEGATIVE
B BURGDOR IGG+IGM SER QL: 0.04 (ref 0–0.89)
RHEUMATOID FACT SER NEPH-ACNC: <7 IU/ML (ref 0–20)

## 2021-06-23 ENCOUNTER — TELEPHONE (OUTPATIENT)
Dept: FAMILY MEDICINE | Facility: OTHER | Age: 61
End: 2021-06-23

## 2021-06-23 DIAGNOSIS — M25.562 ACUTE PAIN OF LEFT KNEE: Primary | ICD-10-CM

## 2021-06-23 LAB
PARASITE SPEC INSPECT: NORMAL
SPECIMEN SOURCE: NORMAL

## 2021-06-23 NOTE — TELEPHONE ENCOUNTER
Patient called stating that she called [facility] about the referral and they didn't have it.  Advised that it goes to [facility], not [facility] and then [facility] will call her and set up an appointment and sometimes it takes a week or two.  She is requesting it to be resent in case they did not receive it.

## 2021-06-24 ENCOUNTER — NURSE TRIAGE (OUTPATIENT)
Dept: FAMILY MEDICINE | Facility: OTHER | Age: 61
End: 2021-06-24

## 2021-07-03 ENCOUNTER — MYC MEDICAL ADVICE (OUTPATIENT)
Dept: FAMILY MEDICINE | Facility: OTHER | Age: 61
End: 2021-07-03

## 2021-07-03 DIAGNOSIS — L50.1 CHRONIC IDIOPATHIC URTICARIA: ICD-10-CM

## 2021-07-03 DIAGNOSIS — F41.1 GAD (GENERALIZED ANXIETY DISORDER): ICD-10-CM

## 2021-07-06 RX ORDER — ESCITALOPRAM OXALATE 20 MG/1
20 TABLET ORAL DAILY
Qty: 90 TABLET | Refills: 1 | Status: SHIPPED | OUTPATIENT
Start: 2021-07-06 | End: 2022-01-05

## 2021-08-08 ENCOUNTER — HEALTH MAINTENANCE LETTER (OUTPATIENT)
Age: 61
End: 2021-08-08

## 2021-10-03 ENCOUNTER — HEALTH MAINTENANCE LETTER (OUTPATIENT)
Age: 61
End: 2021-10-03

## 2021-10-15 ENCOUNTER — MEDICAL CORRESPONDENCE (OUTPATIENT)
Dept: HEALTH INFORMATION MANAGEMENT | Facility: CLINIC | Age: 61
End: 2021-10-15

## 2021-11-04 NOTE — PROGRESS NOTES
[facility] [place]  [address]  [place] [address]  Phone: [phone]  Primary Provider: Mia Rainey  Pre-op Performing Provider: MIA RAINEY      PREOPERATIVE EVALUATION:  Today's date: 11/5/2021    Estella ORO Aas is a 61 year old female who presents for a preoperative evaluation.    Surgical Information:  Surgery/Procedure: Right Knee Replacement   Surgery Location: [facility]  Surgeon: Dr. Lima  Surgery Date: 11/11/2021  Time of Surgery: tbd  Where patient plans to recover: At home with family  Fax number for surgical facility: [phone]    Type of Anesthesia Anticipated: to be determined    Assessment & Plan     The proposed surgical procedure is considered INTERMEDIATE risk.    Preop general physical exam  She is optimized for surgery of her knee replacement.   - EKG 12-lead complete w/read - (Clinic Performed)  - CBC with platelets and differential; Future  - Basic metabolic panel; Future  - UA reflex to Microscopic and Culture - [place]; Future  - Hemoglobin A1c; Future         Risks and Recommendations:  The patient has the following additional risks and recommendations for perioperative complications:   - No identified additional risk factors other than previously addressed    Medication Instructions:   - ibuprofen (Advil, Motrin): HOLD 1 day before surgery.     RECOMMENDATION:  APPROVAL GIVEN to proceed with proposed procedure, without further diagnostic evaluation.    Review of external notes as documented above           10  minutes spent on the date of the encounter doing chart review, review of test results, patient visit and documentation         Subjective     HPI related to upcoming procedure: she is going to be having right knee replaced then her left knee.    Dr. Lima will be doing this at the surgery suites in [place], [place].   Doing out patient procedure.      Preop Questions 11/2/2021   1. Have you ever had a heart attack or stroke? No   2. Have you ever had  surgery on your heart or blood vessels, such as a stent placement, a coronary artery bypass, or surgery on an artery in your head, neck, heart, or legs? No   3. Do you have chest pain with activity? No   4. Do you have a history of  heart failure? No   5. Do you currently have a cold, bronchitis or symptoms of other infection? No   6. Do you have a cough, shortness of breath, or wheezing? No   7. Do you or anyone in your family have previous history of blood clots? No   8. Do you or does anyone in your family have a serious bleeding problem such as prolonged bleeding following surgeries or cuts? No   9. Have you ever had problems with anemia or been told to take iron pills? YES -    10. Have you had any abnormal blood loss such as black, tarry or bloody stools, or abnormal vaginal bleeding? No   11. Have you ever had a blood transfusion? No   12. Are you willing to have a blood transfusion if it is medically needed before, during, or after your surgery? Yes   13. Have you or any of your relatives ever had problems with anesthesia? No   14. Do you have sleep apnea, excessive snoring or daytime drowsiness? No   15. Do you have any artifical heart valves or other implanted medical devices like a pacemaker, defibrillator, or continuous glucose monitor? No   16. Do you have artificial joints? YES - hip replacement done over 2 years ago.    17. Are you allergic to latex? No       Health Care Directive:  Patient does not have a Health Care Directive or Living Will: Discussed advance care planning with patient; however, patient declined at this time.    Preoperative Review of :   reviewed - controlled substances reflected in medication list.      Status of Chronic Conditions:  DEPRESSION - Patient has a long history of Depression of moderate severity requiring medication for control with recent symptoms being stable..Current symptoms of depression include none.       Review of Systems  CONSTITUTIONAL: NEGATIVE for  fever, chills, change in weight  INTEGUMENTARY/SKIN: NEGATIVE for worrisome rashes, moles or lesions  EYES: NEGATIVE for vision changes or irritation  ENT/MOUTH: NEGATIVE for ear, mouth and throat problems  RESP: NEGATIVE for significant cough or SOB  CV: NEGATIVE for chest pain, palpitations or peripheral edema  GI: NEGATIVE for nausea, abdominal pain, heartburn, or change in bowel habits  : NEGATIVE for frequency, dysuria, or hematuria  MUSCULOSKELETAL: NEGATIVE for significant arthralgias or myalgia  NEURO: NEGATIVE for weakness, dizziness or paresthesias  ENDOCRINE: NEGATIVE for temperature intolerance, skin/hair changes  HEME: NEGATIVE for bleeding problems  PSYCHIATRIC: NEGATIVE for changes in mood or affect    Patient Active Problem List    Diagnosis Date Noted     Fall, subsequent encounter 10/19/2020     Priority: Medium     Closed head injury, subsequent encounter 10/19/2020     Priority: Medium     Significant closed head trauma within past 3 months 09/21/2020     Priority: Medium     Acute recurrent maxillary sinusitis 09/21/2020     Priority: Medium     Special screening for malignant neoplasms, colon 02/26/2020     Priority: Medium     Added automatically from request for surgery [id number]       RADHA (generalized anxiety disorder) 08/18/2019     Priority: Medium     Chronic idiopathic urticaria 08/18/2019     Priority: Medium     Advanced directives, counseling/discussion 02/23/2017     Priority: Medium     Advance Care Planning 2/23/2017: ACP Review of Chart / Resources Provided:  Reviewed chart for advance care plan.  Estella ORO Aas has no plan or code status on file. Discussed available resources and provided with information. Confirmed code status reflects current choices pending further ACP discussions.  Confirmed/documented legally designated decision makers.  Added by Gail Zambrano              Past Medical History:   Diagnosis Date     Depressive disorder, not elsewhere classified 7/23/2008      "Dysthymic disorder 2008     Past Surgical History:   Procedure Laterality Date     AS TOTAL HIP ARTHROPLASTY Left 2017     basilar joint injection, left thumb  2017     C TOTAL HIP ARTHROPLASTY Left 2017     COLONOSCOPY  2006     COLONOSCOPY N/A 2020    Procedure: COLONOSCOPY;  Surgeon: Bradly Jauregui MD;  Location: [facility]     hand      RT     HYSTERECTOMY TOTAL ABDOMINAL N/A     Cervix removed. Ovaries remain.      Current Outpatient Medications   Medication Sig Dispense Refill     calcium citrate (CITRACAL) 950 MG tablet Take 1 tablet by mouth 2 times daily       cholecalciferol (VITAMIN D3) 5000 UNITS TABS tablet Take 10,000 Units by mouth daily       escitalopram (LEXAPRO) 20 MG tablet Take 1 tablet (20 mg) by mouth daily 90 tablet 1       Allergies   Allergen Reactions     Cats      \"Cat/feline product derivatives\"        Social History     Tobacco Use     Smoking status: Current Every Day Smoker     Packs/day: 0.50     Types: Cigarettes     Last attempt to quit: 10/1/2017     Years since quittin.0     Smokeless tobacco: Never Used     Tobacco comment: Quit ; no passive exposure   Substance Use Topics     Alcohol use: Yes     Comment: Socially     Family History   Problem Relation Age of Onset     Other - See Comments Father         Alzheimer's Disease     C.A.D. Father      Hypertension Mother      History   Drug Use No         Objective     /78 (BP Location: Left arm, Patient Position: Sitting, Cuff Size: Adult Regular)   Pulse 72   Temp 97.5  F (36.4  C) (Tympanic)   Ht 1.562 m (5' 1.5\")   Wt 72.1 kg (159 lb)   SpO2 97%   BMI 29.56 kg/m      Physical Exam    GENERAL APPEARANCE: healthy, alert and no distress     EYES: EOMI, PERRL     HENT: ear canals and TM's normal and nose and mouth without ulcers or lesions     NECK: no adenopathy, no asymmetry, masses, or scars and thyroid normal to palpation     RESP: lungs clear to auscultation - no " rales, rhonchi or wheezes     CV: regular rates and rhythm, normal S1 S2, no S3 or S4 and no murmur, click or rub     ABDOMEN:  soft, nontender, no HSM or masses and bowel sounds normal     MS: extremities normal- no gross deformities noted, no evidence of inflammation in joints, FROM in all extremities.     SKIN: no suspicious lesions or rashes     NEURO: Normal strength and tone, sensory exam grossly normal, mentation intact and speech normal     PSYCH: mentation appears normal. and affect normal/bright     LYMPHATICS: No cervical adenopathy    No results for input(s): HGB, PLT, INR, NA, POTASSIUM, CR, A1C in the last 79690 hours.     Diagnostics:  Recent Results (from the past 24 hour(s))   Basic metabolic panel    Collection Time: 11/05/21  2:22 PM   Result Value Ref Range    Sodium 140 133 - 144 mmol/L    Potassium 3.7 3.4 - 5.3 mmol/L    Chloride 106 94 - 109 mmol/L    Carbon Dioxide (CO2) 29 20 - 32 mmol/L    Anion Gap 5 3 - 14 mmol/L    Urea Nitrogen 8 7 - 30 mg/dL    Creatinine 0.70 0.52 - 1.04 mg/dL    Calcium 9.1 8.5 - 10.1 mg/dL    Glucose 80 70 - 99 mg/dL    GFR Estimate >90 >60 mL/min/1.73m2   UA reflex to Microscopic and Culture - [place]    Collection Time: 11/05/21  2:22 PM    Specimen: Urine, Clean Catch   Result Value Ref Range    Color Urine Straw Colorless, Straw, Light Yellow, Yellow    Appearance Urine Clear Clear    Glucose Urine Negative Negative mg/dL    Bilirubin Urine Negative Negative    Ketones Urine Negative Negative mg/dL    Specific Gravity Urine 1.003 1.003 - 1.035    Blood Urine Negative Negative    pH Urine 6.0 4.7 - 8.0    Protein Albumin Urine Negative Negative mg/dL    Urobilinogen Urine Normal Normal, 2.0 mg/dL    Nitrite Urine Negative Negative    Leukocyte Esterase Urine Negative Negative   CBC with platelets and differential    Collection Time: 11/05/21  2:22 PM   Result Value Ref Range    WBC Count 8.2 4.0 - 11.0 10e3/uL    RBC Count 4.73 3.80 - 5.20 10e6/uL    Hemoglobin  13.6 11.7 - 15.7 g/dL    Hematocrit 41.0 35.0 - 47.0 %    MCV 87 78 - 100 fL    MCH 28.8 26.5 - 33.0 pg    MCHC 33.2 31.5 - 36.5 g/dL    RDW 12.8 10.0 - 15.0 %    Platelet Count 291 150 - 450 10e3/uL    % Neutrophils 57 %    % Lymphocytes 36 %    % Monocytes 5 %    % Eosinophils 2 %    % Basophils 0 %    % Immature Granulocytes 0 %    NRBCs per 100 WBC 0 <1 /100    Absolute Neutrophils 4.6 1.6 - 8.3 10e3/uL    Absolute Lymphocytes 2.9 0.8 - 5.3 10e3/uL    Absolute Monocytes 0.4 0.0 - 1.3 10e3/uL    Absolute Eosinophils 0.2 0.0 - 0.7 10e3/uL    Absolute Basophils 0.0 0.0 - 0.2 10e3/uL    Absolute Immature Granulocytes 0.0 <=0.0 10e3/uL    Absolute NRBCs 0.0 10e3/uL      No EKG this visit, completed in the last 90 days.  EKG: appears normal, NSR, normal axis, normal intervals, no acute ST/T changes c/w ischemia, no LVH by voltage criteria, unchanged from previous tracings    Revised Cardiac Risk Index (RCRI):  The patient has the following serious cardiovascular risks for perioperative complications:   - No serious cardiac risks = 0 points     RCRI Interpretation: 0 points: Class I (very low risk - 0.4% complication rate)        Signed Electronically by: FELIX Joe  Copy of this evaluation report is provided to requesting physician.

## 2021-11-05 ENCOUNTER — OFFICE VISIT (OUTPATIENT)
Dept: FAMILY MEDICINE | Facility: OTHER | Age: 61
End: 2021-11-05
Attending: PHYSICIAN ASSISTANT
Payer: COMMERCIAL

## 2021-11-05 VITALS
TEMPERATURE: 97.5 F | SYSTOLIC BLOOD PRESSURE: 120 MMHG | BODY MASS INDEX: 29.26 KG/M2 | DIASTOLIC BLOOD PRESSURE: 78 MMHG | HEART RATE: 72 BPM | WEIGHT: 159 LBS | HEIGHT: 62 IN | OXYGEN SATURATION: 97 %

## 2021-11-05 DIAGNOSIS — Z01.818 PREOP GENERAL PHYSICAL EXAM: Primary | ICD-10-CM

## 2021-11-05 LAB
ALBUMIN UR-MCNC: NEGATIVE MG/DL
ANION GAP SERPL CALCULATED.3IONS-SCNC: 5 MMOL/L (ref 3–14)
APPEARANCE UR: CLEAR
BASOPHILS # BLD AUTO: 0 10E3/UL (ref 0–0.2)
BASOPHILS NFR BLD AUTO: 0 %
BILIRUB UR QL STRIP: NEGATIVE
BUN SERPL-MCNC: 8 MG/DL (ref 7–30)
CALCIUM SERPL-MCNC: 9.1 MG/DL (ref 8.5–10.1)
CHLORIDE BLD-SCNC: 106 MMOL/L (ref 94–109)
CO2 SERPL-SCNC: 29 MMOL/L (ref 20–32)
COLOR UR AUTO: NORMAL
CREAT SERPL-MCNC: 0.7 MG/DL (ref 0.52–1.04)
EOSINOPHIL # BLD AUTO: 0.2 10E3/UL (ref 0–0.7)
EOSINOPHIL NFR BLD AUTO: 2 %
ERYTHROCYTE [DISTWIDTH] IN BLOOD BY AUTOMATED COUNT: 12.8 % (ref 10–15)
EST. AVERAGE GLUCOSE BLD GHB EST-MCNC: 105 MG/DL
GFR SERPL CREATININE-BSD FRML MDRD: >90 ML/MIN/1.73M2
GLUCOSE BLD-MCNC: 80 MG/DL (ref 70–99)
GLUCOSE UR STRIP-MCNC: NEGATIVE MG/DL
HBA1C MFR BLD: 5.3 % (ref 0–5.6)
HCT VFR BLD AUTO: 41 % (ref 35–47)
HGB BLD-MCNC: 13.6 G/DL (ref 11.7–15.7)
HGB UR QL STRIP: NEGATIVE
IMM GRANULOCYTES # BLD: 0 10E3/UL
IMM GRANULOCYTES NFR BLD: 0 %
KETONES UR STRIP-MCNC: NEGATIVE MG/DL
LEUKOCYTE ESTERASE UR QL STRIP: NEGATIVE
LYMPHOCYTES # BLD AUTO: 2.9 10E3/UL (ref 0.8–5.3)
LYMPHOCYTES NFR BLD AUTO: 36 %
MCH RBC QN AUTO: 28.8 PG (ref 26.5–33)
MCHC RBC AUTO-ENTMCNC: 33.2 G/DL (ref 31.5–36.5)
MCV RBC AUTO: 87 FL (ref 78–100)
MONOCYTES # BLD AUTO: 0.4 10E3/UL (ref 0–1.3)
MONOCYTES NFR BLD AUTO: 5 %
NEUTROPHILS # BLD AUTO: 4.6 10E3/UL (ref 1.6–8.3)
NEUTROPHILS NFR BLD AUTO: 57 %
NITRATE UR QL: NEGATIVE
NRBC # BLD AUTO: 0 10E3/UL
NRBC BLD AUTO-RTO: 0 /100
PH UR STRIP: 6 [PH] (ref 4.7–8)
PLATELET # BLD AUTO: 291 10E3/UL (ref 150–450)
POTASSIUM BLD-SCNC: 3.7 MMOL/L (ref 3.4–5.3)
RBC # BLD AUTO: 4.73 10E6/UL (ref 3.8–5.2)
SODIUM SERPL-SCNC: 140 MMOL/L (ref 133–144)
SP GR UR STRIP: 1 (ref 1–1.03)
UROBILINOGEN UR STRIP-MCNC: NORMAL MG/DL
WBC # BLD AUTO: 8.2 10E3/UL (ref 4–11)

## 2021-11-05 PROCEDURE — 80048 BASIC METABOLIC PNL TOTAL CA: CPT | Performed by: PHYSICIAN ASSISTANT

## 2021-11-05 PROCEDURE — 83036 HEMOGLOBIN GLYCOSYLATED A1C: CPT | Performed by: PHYSICIAN ASSISTANT

## 2021-11-05 PROCEDURE — 36415 COLL VENOUS BLD VENIPUNCTURE: CPT | Performed by: PHYSICIAN ASSISTANT

## 2021-11-05 PROCEDURE — 99214 OFFICE O/P EST MOD 30 MIN: CPT | Performed by: PHYSICIAN ASSISTANT

## 2021-11-05 PROCEDURE — 85025 COMPLETE CBC W/AUTO DIFF WBC: CPT | Performed by: PHYSICIAN ASSISTANT

## 2021-11-05 PROCEDURE — 81003 URINALYSIS AUTO W/O SCOPE: CPT | Performed by: PHYSICIAN ASSISTANT

## 2021-11-05 PROCEDURE — 93010 ELECTROCARDIOGRAM REPORT: CPT | Mod: 77 | Performed by: INTERNAL MEDICINE

## 2021-11-05 ASSESSMENT — PATIENT HEALTH QUESTIONNAIRE - PHQ9: SUM OF ALL RESPONSES TO PHQ QUESTIONS 1-9: 0

## 2021-11-05 ASSESSMENT — ANXIETY QUESTIONNAIRES
5. BEING SO RESTLESS THAT IT IS HARD TO SIT STILL: NOT AT ALL
7. FEELING AFRAID AS IF SOMETHING AWFUL MIGHT HAPPEN: NOT AT ALL
2. NOT BEING ABLE TO STOP OR CONTROL WORRYING: NOT AT ALL
GAD7 TOTAL SCORE: 0
4. TROUBLE RELAXING: NOT AT ALL
6. BECOMING EASILY ANNOYED OR IRRITABLE: NOT AT ALL
3. WORRYING TOO MUCH ABOUT DIFFERENT THINGS: NOT AT ALL
1. FEELING NERVOUS, ANXIOUS, OR ON EDGE: NOT AT ALL

## 2021-11-05 ASSESSMENT — MIFFLIN-ST. JEOR: SCORE: 1226.53

## 2021-11-05 ASSESSMENT — PAIN SCALES - GENERAL: PAINLEVEL: MODERATE PAIN (5)

## 2021-11-05 NOTE — NURSING NOTE
"Chief Complaint   Patient presents with     Pre-Op Exam       Initial /78 (BP Location: Left arm, Patient Position: Sitting, Cuff Size: Adult Regular)   Pulse 72   Temp 97.5  F (36.4  C) (Tympanic)   Ht 1.562 m (5' 1.5\")   Wt 72.1 kg (159 lb)   SpO2 97%   BMI 29.56 kg/m   Estimated body mass index is 29.56 kg/m  as calculated from the following:    Height as of this encounter: 1.562 m (5' 1.5\").    Weight as of this encounter: 72.1 kg (159 lb).  Medication Reconciliation: complete  Yajaira Chacon LPN  "

## 2021-11-06 ASSESSMENT — ANXIETY QUESTIONNAIRES: GAD7 TOTAL SCORE: 0

## 2021-11-07 ENCOUNTER — OFFICE VISIT (OUTPATIENT)
Dept: FAMILY MEDICINE | Facility: OTHER | Age: 61
End: 2021-11-07
Attending: PHYSICIAN ASSISTANT
Payer: COMMERCIAL

## 2021-11-07 DIAGNOSIS — Z20.822 COVID-19 RULED OUT: Primary | ICD-10-CM

## 2021-11-07 PROCEDURE — U0003 INFECTIOUS AGENT DETECTION BY NUCLEIC ACID (DNA OR RNA); SEVERE ACUTE RESPIRATORY SYNDROME CORONAVIRUS 2 (SARS-COV-2) (CORONAVIRUS DISEASE [COVID-19]), AMPLIFIED PROBE TECHNIQUE, MAKING USE OF HIGH THROUGHPUT TECHNOLOGIES AS DESCRIBED BY CMS-2020-01-R: HCPCS

## 2021-11-07 PROCEDURE — U0005 INFEC AGEN DETEC AMPLI PROBE: HCPCS

## 2021-11-08 LAB — SARS-COV-2 RNA RESP QL NAA+PROBE: NEGATIVE

## 2021-11-09 ENCOUNTER — TELEPHONE (OUTPATIENT)
Dept: FAMILY MEDICINE | Facility: OTHER | Age: 61
End: 2021-11-09
Payer: COMMERCIAL

## 2021-11-22 ENCOUNTER — TRANSFERRED RECORDS (OUTPATIENT)
Dept: HEALTH INFORMATION MANAGEMENT | Facility: CLINIC | Age: 61
End: 2021-11-22

## 2021-12-21 ENCOUNTER — TRANSFERRED RECORDS (OUTPATIENT)
Dept: HEALTH INFORMATION MANAGEMENT | Facility: CLINIC | Age: 61
End: 2021-12-21

## 2022-01-23 ENCOUNTER — HEALTH MAINTENANCE LETTER (OUTPATIENT)
Age: 62
End: 2022-01-23

## 2022-03-07 ENCOUNTER — OFFICE VISIT (OUTPATIENT)
Dept: CHIROPRACTIC MEDICINE | Facility: OTHER | Age: 62
End: 2022-03-07
Attending: CHIROPRACTOR
Payer: COMMERCIAL

## 2022-03-07 DIAGNOSIS — M54.2 CERVICALGIA: ICD-10-CM

## 2022-03-07 DIAGNOSIS — M99.01 SEGMENTAL AND SOMATIC DYSFUNCTION OF CERVICAL REGION: Primary | ICD-10-CM

## 2022-03-07 DIAGNOSIS — M99.03 SEGMENTAL AND SOMATIC DYSFUNCTION OF LUMBAR REGION: ICD-10-CM

## 2022-03-07 DIAGNOSIS — M99.02 SEGMENTAL AND SOMATIC DYSFUNCTION OF THORACIC REGION: ICD-10-CM

## 2022-03-07 PROCEDURE — 98941 CHIROPRACT MANJ 3-4 REGIONS: CPT | Mod: AT | Performed by: CHIROPRACTOR

## 2022-07-21 ENCOUNTER — OFFICE VISIT (OUTPATIENT)
Dept: CHIROPRACTIC MEDICINE | Facility: OTHER | Age: 62
End: 2022-07-21
Attending: CHIROPRACTOR
Payer: COMMERCIAL

## 2022-07-21 DIAGNOSIS — M99.02 SEGMENTAL AND SOMATIC DYSFUNCTION OF THORACIC REGION: ICD-10-CM

## 2022-07-21 DIAGNOSIS — M99.01 SEGMENTAL AND SOMATIC DYSFUNCTION OF CERVICAL REGION: ICD-10-CM

## 2022-07-21 DIAGNOSIS — M54.50 ACUTE BILATERAL LOW BACK PAIN WITHOUT SCIATICA: ICD-10-CM

## 2022-07-21 DIAGNOSIS — M99.03 SEGMENTAL AND SOMATIC DYSFUNCTION OF LUMBAR REGION: Primary | ICD-10-CM

## 2022-07-21 PROCEDURE — 98940 CHIROPRACT MANJ 1-2 REGIONS: CPT | Mod: AT | Performed by: CHIROPRACTOR

## 2022-08-01 NOTE — PROGRESS NOTES
Subjective Finding:    Chief compalint: Patient presents with:  Back Pain  , Pain Scale: 4/10, Intensity: sharp, Duration: 3 days, Change since last visit: , Radiating: no.    Date of injury:     Activities that the pain restricts:   Home/household/hobbies/social activities: no.  Work duties: no.  Sleep: no.  Makes symptoms better: rest.  Makes symptoms worse: activity.  Have you seen anyone else for the symptoms? no.  Work related: no  Automobile related injury: no.    Objective and Assessment:    Posture Analysis:   High shoulder: right.  Head tilt: right.  High iliac crest: .  Head carriage: forward.  Thoracic Kyphosis: neutral.  Lumbar Lordosis: neutral.    Lumbar Range of Motion: extension decreased.  Cervical Range of Motion: .  Thoracic Range of Motion: extension decreased.  Extremity Range of Motion: .    Palpation:   Lev scapulae: stiff, referred pain: no   Subocc mM    Segmental dysfunction pre-treatment  T34.  C\2  C56    Assessment post-treatment:  Cervical: ROM increased.  Thoracic: ROM increased.  Lumbar: ROM increased.    Comments: .  Past left hip replacement      Complicating Factors: .    Plan / Procedure:    Expected release date: .  Treatment plan: 2 times 2 weeks.  Instructed patient: ice 20 minutes every other hour as needed.  Short term goals: reduce pain.  Long term goals: restore normal function.  Prognosis: excellent.

## 2022-08-25 ENCOUNTER — ANCILLARY PROCEDURE (OUTPATIENT)
Dept: GENERAL RADIOLOGY | Facility: OTHER | Age: 62
End: 2022-08-25
Attending: PHYSICIAN ASSISTANT
Payer: COMMERCIAL

## 2022-08-25 ENCOUNTER — OFFICE VISIT (OUTPATIENT)
Dept: FAMILY MEDICINE | Facility: OTHER | Age: 62
End: 2022-08-25
Attending: PHYSICIAN ASSISTANT
Payer: COMMERCIAL

## 2022-08-25 VITALS
SYSTOLIC BLOOD PRESSURE: 98 MMHG | HEART RATE: 60 BPM | WEIGHT: 161 LBS | BODY MASS INDEX: 29.93 KG/M2 | DIASTOLIC BLOOD PRESSURE: 60 MMHG | TEMPERATURE: 96.8 F

## 2022-08-25 DIAGNOSIS — Z13.88 SCREENING FOR LEAD EXPOSURE: Primary | ICD-10-CM

## 2022-08-25 DIAGNOSIS — R53.83 ALWAYS TIRED: ICD-10-CM

## 2022-08-25 DIAGNOSIS — R05.9 COUGH: ICD-10-CM

## 2022-08-25 DIAGNOSIS — G47.00 PERSISTENT INSOMNIA: ICD-10-CM

## 2022-08-25 LAB
ALBUMIN SERPL-MCNC: 3.8 G/DL (ref 3.4–5)
ALP SERPL-CCNC: 83 U/L (ref 40–150)
ALT SERPL W P-5'-P-CCNC: 15 U/L (ref 0–50)
ANION GAP SERPL CALCULATED.3IONS-SCNC: 3 MMOL/L (ref 3–14)
AST SERPL W P-5'-P-CCNC: 16 U/L (ref 0–45)
BASOPHILS # BLD AUTO: 0.1 10E3/UL (ref 0–0.2)
BASOPHILS NFR BLD AUTO: 1 %
BILIRUB SERPL-MCNC: 0.4 MG/DL (ref 0.2–1.3)
BUN SERPL-MCNC: 5 MG/DL (ref 7–30)
CALCIUM SERPL-MCNC: 9.3 MG/DL (ref 8.5–10.1)
CHLORIDE BLD-SCNC: 107 MMOL/L (ref 94–109)
CO2 SERPL-SCNC: 29 MMOL/L (ref 20–32)
CREAT SERPL-MCNC: 0.7 MG/DL (ref 0.52–1.04)
EOSINOPHIL # BLD AUTO: 0.2 10E3/UL (ref 0–0.7)
EOSINOPHIL NFR BLD AUTO: 3 %
ERYTHROCYTE [DISTWIDTH] IN BLOOD BY AUTOMATED COUNT: 14.4 % (ref 10–15)
GFR SERPL CREATININE-BSD FRML MDRD: >90 ML/MIN/1.73M2
GLUCOSE BLD-MCNC: 92 MG/DL (ref 70–99)
HCT VFR BLD AUTO: 36.4 % (ref 35–47)
HGB BLD-MCNC: 11.3 G/DL (ref 11.7–15.7)
IMM GRANULOCYTES # BLD: 0 10E3/UL
IMM GRANULOCYTES NFR BLD: 0 %
LYMPHOCYTES # BLD AUTO: 2.6 10E3/UL (ref 0.8–5.3)
LYMPHOCYTES NFR BLD AUTO: 38 %
MCH RBC QN AUTO: 24.3 PG (ref 26.5–33)
MCHC RBC AUTO-ENTMCNC: 31 G/DL (ref 31.5–36.5)
MCV RBC AUTO: 78 FL (ref 78–100)
MONOCYTES # BLD AUTO: 0.4 10E3/UL (ref 0–1.3)
MONOCYTES NFR BLD AUTO: 6 %
NEUTROPHILS # BLD AUTO: 3.5 10E3/UL (ref 1.6–8.3)
NEUTROPHILS NFR BLD AUTO: 52 %
NRBC # BLD AUTO: 0 10E3/UL
NRBC BLD AUTO-RTO: 0 /100
PLATELET # BLD AUTO: 344 10E3/UL (ref 150–450)
POTASSIUM BLD-SCNC: 3.6 MMOL/L (ref 3.4–5.3)
PROT SERPL-MCNC: 7.4 G/DL (ref 6.8–8.8)
RBC # BLD AUTO: 4.65 10E6/UL (ref 3.8–5.2)
SODIUM SERPL-SCNC: 139 MMOL/L (ref 133–144)
TSH SERPL DL<=0.005 MIU/L-ACNC: 3.04 MU/L (ref 0.4–4)
WBC # BLD AUTO: 6.7 10E3/UL (ref 4–11)

## 2022-08-25 PROCEDURE — 80050 GENERAL HEALTH PANEL: CPT | Performed by: PHYSICIAN ASSISTANT

## 2022-08-25 PROCEDURE — 99214 OFFICE O/P EST MOD 30 MIN: CPT | Performed by: PHYSICIAN ASSISTANT

## 2022-08-25 PROCEDURE — 36415 COLL VENOUS BLD VENIPUNCTURE: CPT | Performed by: PHYSICIAN ASSISTANT

## 2022-08-25 PROCEDURE — 71046 X-RAY EXAM CHEST 2 VIEWS: CPT | Mod: TC | Performed by: RADIOLOGY

## 2022-08-25 PROCEDURE — 83655 ASSAY OF LEAD: CPT | Mod: 90 | Performed by: PHYSICIAN ASSISTANT

## 2022-08-25 RX ORDER — ZOLPIDEM TARTRATE 5 MG/1
5 TABLET ORAL
Qty: 30 TABLET | Refills: 0 | Status: SHIPPED | OUTPATIENT
Start: 2022-08-25 | End: 2022-10-10

## 2022-08-25 ASSESSMENT — PAIN SCALES - GENERAL: PAINLEVEL: NO PAIN (0)

## 2022-08-25 NOTE — NURSING NOTE
"Chief Complaint   Patient presents with     Recheck Medication       Initial BP 98/60   Pulse 60   Temp 96.8  F (36  C)   Wt 73 kg (161 lb)   BMI 29.93 kg/m   Estimated body mass index is 29.93 kg/m  as calculated from the following:    Height as of 11/5/21: 1.562 m (5' 1.5\").    Weight as of this encounter: 73 kg (161 lb).  Medication Reconciliation: complete  Jessa Behrman, LPN  "

## 2022-08-25 NOTE — PROGRESS NOTES
"  Assessment & Plan     Screening for lead exposure  Painted her very old garage with out any protection over her mouth. Lead based pain.  Severe fatigue and achiness.  Will be getting lead level.   - Lead Capillary ([facility]); Future    Always tired  Labs and improve sleep.  Will try her on medications.  She is dysthymic and Lexapro won't be changed at this time.  We will recheck in a month.   - TSH with free T4 reflex; Future  - CBC with platelets and differential; Future  - Comprehensive metabolic panel (BMP + Alb, Alk Phos, ALT, AST, Total. Bili, TP); Future    Cough  Over a year and now feels like something in right tongue base region for which I can't see. Concerned about this.  Send to ENT for Scope and recommendations.   - XR CHEST 2 VW (Clinic Performed); Future  - Adult ENT  Referral; Future    Persistent insomnia  Start on low dose Ambien. Melatonin not helping awake many times at night never gets more than 2 hours of sleep.   - zolpidem (AMBIEN) 5 MG tablet; Take 1 tablet (5 mg) by mouth nightly as needed for sleep      15 minutes spent on the date of the encounter doing chart review, history and exam, documentation and further activities per the note       Nicotine/Tobacco Cessation:  She reports that she has been smoking cigarettes. She has been smoking about 0.50 packs per day. She has never used smokeless tobacco.  Nicotine/Tobacco Cessation Plan:       BMI:   Estimated body mass index is 29.93 kg/m  as calculated from the following:    Height as of 11/5/21: 1.562 m (5' 1.5\").    Weight as of this encounter: 73 kg (161 lb).   Weight management plan: Discussed healthy diet and exercise guidelines    See Patient Instructions    No follow-ups on file.    FELIX Joe  [facility] [place] Tineo is a 61 year old presenting for the following health issues:  Recheck Medication      HPI     Medication Followup of lexapro     Taking Medication as prescribed: " yes    Side Effects:  Feeling bleh    Medication Helping Symptoms:  Helped for a year, tried upping and it was not great.     Insomnia  Onset/Duration: many years   Description:   Frequency of insomnia:  nightly  Time to fall asleep (sleep latency): 1 hour  Middle of night awakening:  YES  Early morning awakening:  YES  Progression of Symptoms:  worsening  Accompanying Signs & Symptoms:  Daytime sleepiness/napping: YES  Excessive snoring/apnea: lives alone not sure.   Restless legs: YES  Waking to urinate: No  Chronic pain:  YES  Depression symptoms (if yes, do PHQ9): YES  Anxiety symptoms (if yes, do RADHA-7): YES  History:  Prior Insomnia: YES  New stressful situation: No  Precipitating factors:   Caffeine intake: YES  OTC decongestants: No  Any new medications: No  Alleviating factors:  Self medicating (alcohol, etc.):  No  Stress-reduction (exercise, yoga, meditation etc): No  Therapies tried and outcome: caffeine avoidance and melatonin         Review of Systems   Constitutional, HEENT, cardiovascular, pulmonary, GI, , musculoskeletal, neuro, skin, endocrine and psych systems are negative, except as otherwise noted.      Objective    BP 98/60   Pulse 60   Temp 96.8  F (36  C)   Wt 73 kg (161 lb)   BMI 29.93 kg/m    Body mass index is 29.93 kg/m .  Physical Exam   GENERAL: healthy, alert and no distress  EYES: Eyes grossly normal to inspection, PERRL and conjunctivae and sclerae normal  HENT: ear canals and TM's normal, nose and mouth without ulcers or lesions  NECK: no adenopathy, no asymmetry, masses, or scars and thyroid normal to palpation  RESP: lungs clear to auscultation - no rales, rhonchi or wheezes  CV: regular rate and rhythm, normal S1 S2, no S3 or S4, no murmur, click or rub, no peripheral edema and peripheral pulses strong  ABDOMEN: soft, nontender, no hepatosplenomegaly, no masses and bowel sounds normal  MS: no gross musculoskeletal defects noted, no edema  PSYCH: mentation appears kiesha  She  has flat affect and appears tired.  Can fall asleep driving.   LYMPH: no cervical, supraclavicular, axillary, or inguinal adenopathy    Office Visit on 11/07/2021   Component Date Value Ref Range Status     SARS CoV2 PCR 11/07/2021 Negative  Negative Final    NEGATIVE: SARS-CoV-2 (COVID-19) RNA not detected, presumed negative.                   .  ..

## 2022-08-25 NOTE — PATIENT INSTRUCTIONS
Thank you for choosing [facility].   I have office hours 8:00 am to 4:30 pm on Monday's, Wednesday's, Thursday's and Friday's. My nurse and I are out of the office every Tuesday.    Following your visit, when your labs and diagnostic testing have returned, I will review then and you will be contacted by my nurse.  If you are on My Chart, you can also view results there.    For refills, notify your pharmacy regarding what you need and the pharmacy will generate a refill request. Do not call my nurse as she is unable to process refill request. Please plan ahead and allow 3-5 days for refill requests.    You will generally receive a reminder call the day prior to your appointment.  If you cannot attend your appointment, please cancel your appointment with as much notice as possible.  If there is a pattern of failure to present for your appointments, I cannot provide consistent, meaningful, ongoing care for you. It is very important to me that you come in for your care, so we can best assist you with your health care needs.    IMPORTANT:  Please note that it is my standard of practice to NOT participate in prescribing ongoing requested Narcotic Analgesic therapy, and/or participate in the prescribing of other controlled substances.  My nurse and I am happy to assist you with the process of referral for alternative pain management as needed, and other treatment modalities including but not limited to:  Physical Therapy, Physical Medicine and Rehab, Counseling, Chiropractic Care, Orthopedic Care, and non-narcotic medication management.     In the event that you need to be seen for emergent concerns and I am out of office,  please see one of my colleagues for acute concerns.  You may also present to  or ER.  I appreciate the opportunity to serve you and look forward to supporting your healthcare needs in the future. Please contact me with any questions or concerns that you may  have.    Sincerely,      Mia Villagomez RN, PA-C

## 2022-08-28 LAB — LEAD BLDV-MCNC: <2 UG/DL

## 2022-09-04 ENCOUNTER — HEALTH MAINTENANCE LETTER (OUTPATIENT)
Age: 62
End: 2022-09-04

## 2022-10-10 ENCOUNTER — MYC REFILL (OUTPATIENT)
Dept: FAMILY MEDICINE | Facility: OTHER | Age: 62
End: 2022-10-10

## 2022-10-10 DIAGNOSIS — G47.00 PERSISTENT INSOMNIA: ICD-10-CM

## 2022-10-13 RX ORDER — ZOLPIDEM TARTRATE 5 MG/1
5 TABLET ORAL
Qty: 30 TABLET | Refills: 0 | Status: SHIPPED | OUTPATIENT
Start: 2022-10-13 | End: 2023-01-17

## 2022-10-13 NOTE — TELEPHONE ENCOUNTER
ambien 5 mg      Last Written Prescription Date:  8/25/22  Last Fill Quantity: 30,   # refills: 0  Last Office Visit: 8/25/22  Future Office visit:    Next 5 appointments (look out 90 days)    Nov 11, 2022  8:45 AM  (Arrive by 8:30 AM)  SHORT with FELIX Davis  [facility] [place] ([facility] [place] ) [address]  [place] [address]  [phone]         PCP is out  Sent to covering Provider to review      Routing refill request to provider for review/approval because:  Drug not on the [facility], [facility] or  Health refill protocol or controlled substance

## 2022-11-10 NOTE — PROGRESS NOTES
Assessment & Plan     Encounter for screening mammogram for breast cancer  She is recommended to get screening done.   - MA SCREENING DIGITAL BILAT - Future  (s+30); Future    RADHA (generalized anxiety disorder)  Ambien is helping sleep.  Many stressors, she is burned out.     Arthritis of left knee  She is really bothered.  Level 6 on a pain scale and she is getting better with use of her pain medication.       4. Hypersomnolence disorder, persistent  We need to work on this.  Very non productive . Extreme sleeping in day time.  Ambien helps her but can't seem to get her sleep at night.  She forgets to to take it until too late  - modafinil (PROVIGIL) 100 MG tablet; Take 1 tablet (100 mg) by mouth daily  Dispense: 30 tablet; Refill: 1      Review of external notes as documented elsewhere in note  Ordering of each unique test  Prescription drug management  10  minutes spent on the date of the encounter doing chart review, history and exam, documentation and further activities per the note       See Patient Instructions    No follow-ups on file.    FELIX Joe  [facility] - DORA Tineo is a 62 year old, presenting for the following health issues:  Recheck Medication      HPI     Anxiety Follow-Up    How are you doing with your anxiety since your last visit? Manageable     Are you having other symptoms that might be associated with anxiety? No    Have you had a significant life event? No     Are you feeling depressed? Yes:  slight    Do you have any concerns with your use of alcohol or other drugs? No    Social History     Tobacco Use     Smoking status: Every Day     Packs/day: 0.50     Types: Cigarettes     Last attempt to quit: 10/1/2017     Years since quittin.1     Smokeless tobacco: Never     Tobacco comments:     Quit ; no passive exposure   Vaping Use     Vaping Use: Never used   Substance Use Topics     Alcohol use: Yes     Comment: Socially     Drug use: No      RADHA-7 SCORE 5/19/2021 11/5/2021 11/11/2022   Total Score 0 0 9     PHQ 5/19/2021 11/5/2021 11/11/2022   PHQ-9 Total Score 4 0 9   Q9: Thoughts of better off dead/self-harm past 2 weeks Not at all Not at all Not at all     Last PHQ-9 11/11/2022   1.  Little interest or pleasure in doing things 1   2.  Feeling down, depressed, or hopeless 1   3.  Trouble falling or staying asleep, or sleeping too much 2   4.  Feeling tired or having little energy 3   5.  Poor appetite or overeating 1   6.  Feeling bad about yourself 0   7.  Trouble concentrating 1   8.  Moving slowly or restless 0   Q9: Thoughts of better off dead/self-harm past 2 weeks 0   PHQ-9 Total Score 9   Difficulty at work, home, or with people Somewhat difficult     RADHA-7  11/11/2022   1. Feeling nervous, anxious, or on edge 1   2. Not being able to stop or control worrying 2   3. Worrying too much about different things 3   4. Trouble relaxing 1   5. Being so restless that it is hard to sit still 0   6. Becoming easily annoyed or irritable 2   7. Feeling afraid, as if something awful might happen 0   RADHA-7 Total Score 9   If you checked any problems, how difficult have they made it for you to do your work, take care of things at home, or get along with other people? Somewhat difficult             Review of Systems   Constitutional, HEENT, cardiovascular, pulmonary, GI, , musculoskeletal, neuro, skin, endocrine and psych systems are negative, except as otherwise noted.      Objective    /80 (BP Location: Left arm, Patient Position: Sitting, Cuff Size: Adult Large)   Pulse 80   Temp 97.8  F (36.6  C) (Tympanic)   Resp 16   Wt 73.9 kg (163 lb)   SpO2 100%   BMI 30.30 kg/m    Body mass index is 30.3 kg/m .  Physical Exam   GENERAL: healthy, alert and no distress  EYES: Eyes grossly normal to inspection, PERRL and conjunctivae and sclerae normal  HENT: ear canals and TM's normal, nose and mouth without ulcers or lesions  NECK: no adenopathy, no  asymmetry, masses, or scars and thyroid normal to palpation  RESP: lungs clear to auscultation - no rales, rhonchi or wheezes  CV: regular rate and rhythm, normal S1 S2, no S3 or S4, no murmur, click or rub, no peripheral edema and peripheral pulses strong  ABDOMEN: soft, nontender, no hepatosplenomegaly, no masses and bowel sounds normal  MS: no gross musculoskeletal defects noted, no edema    Office Visit on 08/25/2022   Component Date Value Ref Range Status     Sodium 08/25/2022 139  133 - 144 mmol/L Final     Potassium 08/25/2022 3.6  3.4 - 5.3 mmol/L Final     Chloride 08/25/2022 107  94 - 109 mmol/L Final     Carbon Dioxide (CO2) 08/25/2022 29  20 - 32 mmol/L Final     Anion Gap 08/25/2022 3  3 - 14 mmol/L Final     Urea Nitrogen 08/25/2022 5 (L)  7 - 30 mg/dL Final     Creatinine 08/25/2022 0.70  0.52 - 1.04 mg/dL Final     Calcium 08/25/2022 9.3  8.5 - 10.1 mg/dL Final     Glucose 08/25/2022 92  70 - 99 mg/dL Final     Alkaline Phosphatase 08/25/2022 83  40 - 150 U/L Final     AST 08/25/2022 16  0 - 45 U/L Final     ALT 08/25/2022 15  0 - 50 U/L Final     Protein Total 08/25/2022 7.4  6.8 - 8.8 g/dL Final     Albumin 08/25/2022 3.8  3.4 - 5.0 g/dL Final     Bilirubin Total 08/25/2022 0.4  0.2 - 1.3 mg/dL Final     GFR Estimate 08/25/2022 >90  >60 mL/min/1.73m2 Final    Effective December 21, 2021 eGFRcr in adults is calculated using the 2021 CKD-EPI creatinine equation which includes age and gender (Sincere et al., NEJM, DOI: 10.1056/BFTUcs2696009)     TSH 08/25/2022 3.04  0.40 - 4.00 mU/L Final     WBC Count 08/25/2022 6.7  4.0 - 11.0 10e3/uL Final     RBC Count 08/25/2022 4.65  3.80 - 5.20 10e6/uL Final     Hemoglobin 08/25/2022 11.3 (L)  11.7 - 15.7 g/dL Final     Hematocrit 08/25/2022 36.4  35.0 - 47.0 % Final     MCV 08/25/2022 78  78 - 100 fL Final     MCH 08/25/2022 24.3 (L)  26.5 - 33.0 pg Final     MCHC 08/25/2022 31.0 (L)  31.5 - 36.5 g/dL Final     RDW 08/25/2022 14.4  10.0 - 15.0 % Final      "Platelet Count 08/25/2022 344  150 - 450 10e3/uL Final     % Neutrophils 08/25/2022 52  % Final     % Lymphocytes 08/25/2022 38  % Final     % Monocytes 08/25/2022 6  % Final     % Eosinophils 08/25/2022 3  % Final     % Basophils 08/25/2022 1  % Final     % Immature Granulocytes 08/25/2022 0  % Final     NRBCs per 100 WBC 08/25/2022 0  <1 /100 Final     Absolute Neutrophils 08/25/2022 3.5  1.6 - 8.3 10e3/uL Final     Absolute Lymphocytes 08/25/2022 2.6  0.8 - 5.3 10e3/uL Final     Absolute Monocytes 08/25/2022 0.4  0.0 - 1.3 10e3/uL Final     Absolute Eosinophils 08/25/2022 0.2  0.0 - 0.7 10e3/uL Final     Absolute Basophils 08/25/2022 0.1  0.0 - 0.2 10e3/uL Final     Absolute Immature Granulocytes 08/25/2022 0.0  <=0.4 10e3/uL Final     Absolute NRBCs 08/25/2022 0.0  10e3/uL Final     Lead Venous Blood 08/25/2022 <2.0  <=4.9 ug/dL Final    Comment: INTERPRETIVE INFORMATION: Lead, Blood (Venous)    Elevated results may be due to skin or collection-related   contamination, including the use of a noncertified   lead-free tube. If contamination concerns exist due to   elevated levels of blood lead, confirmation with a second   specimen collected in a certified lead-free tube is   recommended.    Information sources for blood lead reference intervals and   interpretive comments include the CDC's \"Childhood Lead   Poisoning Prevention: Recommended Actions Based on Blood   Lead Level\" and the \"Adult Blood Lead Epidemiology and   Surveillance: Reference Blood Lead Levels (BLLs) for Adults   in the [place].\" Thresholds and time intervals for retesting,   medical evaluation, and response vary by state and   regulatory body. Contact your State Department of Health   and/or applicable regulatory agency for specific guidance   on medical management recommendations.    This test was developed and its performance characteristics   determined by                            [facility]. It has not been cleared or   approved by " the U.S. Food and Drug Administration. This   test was performed in a CLIA-certified laboratory and is   intended for clinical purposes.         Group          Concentration   Comment    Children       3.5-19.9 ug/dL  Children under the age of 6                                 years are the most vulnerable                                 to the harmful effects of                                  lead exposure. Environmental                                  investigation and exposure                                  history to identify potential                                 sources of lead. Biological                                  and nutritional monitoring                                 are recommended. Follow-up                                  blood lead monitoring is                                  recommended.                                20-44.9 ug/dL   Lead hazard reduction and                                                             prompt medical evaluation are                                 recommended. Contact a                                  Pediatric Environmental                                  Health Specialty Unit or                                  poison control center for                                  guidance.                   Greater than    Critical. Immediate medical                  44.9 ug/dL      evaluation, including                                  detailed neurological exam is                                 recommended. Consider                                  chelation therapy when                                 symptoms of lead toxicity   are                                  present. Contact a Pediatric                                  Environmental Health                                  Specialty Unit or poison                                  control center for                                  assistance.    Adult          5-19.9 ug/dL    Medical removal is                                                              recommended for pregnant                                  women or those who are trying                                 or may become pregnant.                                  Adverse health effects are                                  possible. Reduced lead                                  exposure and increased blood                                  lead monitoring are                                  recommended.                    20-69.9 ug/dL   Adverse health effects are                                  indicated. Medical removal                                  from lead exposure is                                  required by OSHA if blood                                  lead level exceeds 50 ug/dL.                                 Prompt medical evaluation is                                 recommended.                    Greater than    Critical. Immediate medical                  69.9 ug/dL      evaluation is recommended.                                                             Consider chelation therapy                                 when symptoms of lead                                  toxicity are present.  Performed By: [facility]  [address] [place] [id number]  : Terrence Luna MD, PhD

## 2022-11-11 ENCOUNTER — OFFICE VISIT (OUTPATIENT)
Dept: FAMILY MEDICINE | Facility: OTHER | Age: 62
End: 2022-11-11
Attending: PHYSICIAN ASSISTANT
Payer: COMMERCIAL

## 2022-11-11 VITALS
SYSTOLIC BLOOD PRESSURE: 102 MMHG | HEART RATE: 80 BPM | TEMPERATURE: 97.8 F | RESPIRATION RATE: 16 BRPM | WEIGHT: 163 LBS | DIASTOLIC BLOOD PRESSURE: 80 MMHG | OXYGEN SATURATION: 100 % | BODY MASS INDEX: 30.3 KG/M2

## 2022-11-11 DIAGNOSIS — M17.12 ARTHRITIS OF LEFT KNEE: ICD-10-CM

## 2022-11-11 DIAGNOSIS — F17.200 NICOTINE DEPENDENCE, UNCOMPLICATED, UNSPECIFIED NICOTINE PRODUCT TYPE: ICD-10-CM

## 2022-11-11 DIAGNOSIS — G47.10 HYPERSOMNOLENCE DISORDER, PERSISTENT: ICD-10-CM

## 2022-11-11 DIAGNOSIS — Z12.31 ENCOUNTER FOR SCREENING MAMMOGRAM FOR BREAST CANCER: Primary | ICD-10-CM

## 2022-11-11 DIAGNOSIS — F41.1 GAD (GENERALIZED ANXIETY DISORDER): ICD-10-CM

## 2022-11-11 PROCEDURE — 99214 OFFICE O/P EST MOD 30 MIN: CPT | Mod: 25 | Performed by: PHYSICIAN ASSISTANT

## 2022-11-11 PROCEDURE — 90471 IMMUNIZATION ADMIN: CPT | Performed by: PHYSICIAN ASSISTANT

## 2022-11-11 PROCEDURE — 90750 HZV VACC RECOMBINANT IM: CPT | Performed by: PHYSICIAN ASSISTANT

## 2022-11-11 PROCEDURE — 90677 PCV20 VACCINE IM: CPT | Performed by: PHYSICIAN ASSISTANT

## 2022-11-11 PROCEDURE — 90472 IMMUNIZATION ADMIN EACH ADD: CPT | Performed by: PHYSICIAN ASSISTANT

## 2022-11-11 RX ORDER — MODAFINIL 100 MG/1
100 TABLET ORAL DAILY
Qty: 30 TABLET | Refills: 1 | Status: SHIPPED | OUTPATIENT
Start: 2022-11-11 | End: 2023-01-05

## 2022-11-11 ASSESSMENT — PATIENT HEALTH QUESTIONNAIRE - PHQ9: SUM OF ALL RESPONSES TO PHQ QUESTIONS 1-9: 9

## 2022-11-11 ASSESSMENT — ANXIETY QUESTIONNAIRES
4. TROUBLE RELAXING: SEVERAL DAYS
GAD7 TOTAL SCORE: 9
IF YOU CHECKED OFF ANY PROBLEMS ON THIS QUESTIONNAIRE, HOW DIFFICULT HAVE THESE PROBLEMS MADE IT FOR YOU TO DO YOUR WORK, TAKE CARE OF THINGS AT HOME, OR GET ALONG WITH OTHER PEOPLE: SOMEWHAT DIFFICULT
7. FEELING AFRAID AS IF SOMETHING AWFUL MIGHT HAPPEN: NOT AT ALL
5. BEING SO RESTLESS THAT IT IS HARD TO SIT STILL: NOT AT ALL
3. WORRYING TOO MUCH ABOUT DIFFERENT THINGS: NEARLY EVERY DAY
2. NOT BEING ABLE TO STOP OR CONTROL WORRYING: MORE THAN HALF THE DAYS
GAD7 TOTAL SCORE: 9
1. FEELING NERVOUS, ANXIOUS, OR ON EDGE: SEVERAL DAYS
6. BECOMING EASILY ANNOYED OR IRRITABLE: MORE THAN HALF THE DAYS

## 2022-11-11 NOTE — NURSING NOTE
"Chief Complaint   Patient presents with     Recheck Medication       Initial /80 (BP Location: Left arm, Patient Position: Sitting, Cuff Size: Adult Large)   Pulse 80   Temp 97.8  F (36.6  C) (Tympanic)   Resp 16   Wt 73.9 kg (163 lb)   SpO2 100%   BMI 30.30 kg/m   Estimated body mass index is 30.3 kg/m  as calculated from the following:    Height as of 11/5/21: 1.562 m (5' 1.5\").    Weight as of this encounter: 73.9 kg (163 lb).  Medication Reconciliation: complete  Jori Alvarez LPN  "

## 2022-11-11 NOTE — PATIENT INSTRUCTIONS
How to Quit Smoking  Smoking is a hard habit to break. About 50% of all people who have ever smoked have been able to quit. Most people who still smoke want to quit. Here are some of the best ways to stop smoking.     Keep in mind the health benefits of quitting  The health benefits of quitting start right away. They keep improving the longer you go without smoking. Knowing this can help inspire you to stay on track. These benefits occur at any age. If you are 17 or 70, quitting is a good choice. Some of the health benefits after your last cigarette include:     After 20 minutes: Your blood pressure and pulse return to normal.    After 8 hours: Your oxygen levels return to normal.    After 2 days: Your ability to smell and taste start to improve as damaged nerves regrow.    After 2 to 3 weeks: Your circulation and lung function improve.    After 1 to 9 months: Your coughing, congestion, and shortness of breath decrease. Your tiredness decreases.    After 1 year: Your risk of heart attack decreases by 50%.    After 5 years: Your risk of lung cancer decreases by 50%. Your risk of stroke becomes the same as a nonsmoker s.  Go cold turkey  Most former smokers quit cold turkey. This means stopping all at once. Trying to cut back slowly often doesn't work as well. This may be because it continues the habit of smoking. Also you may inhale more smoke while smoking fewer cigarettes. This leads to the same amount of nicotine in your body.   Get support  Support programs can be a big help, especially for heavy smokers. These groups offer lectures, ways to change behavior, and peer support. Here are some ways to find a support program:     Free national quitline 800-QUIT-NOW (050-393-3351)    [facility] quit-smoking programs    American Lung Association 118-811-1564    American Cancer Society 234-224-3269  Support at home is important too. Family and friends can offer praise and reassurance. If the smoker in your life finds  it hard to quit, encourage them to keep trying.   Try over-the-counter medicine  Nicotine replacement therapy may make it easier to quit. Some aids are available without a prescription. These include a nicotine patch, gum, and lozenges. But it's best to use these under the care of your healthcare provider. The skin patch gives a steady supply of nicotine. Nicotine gum and lozenges give short-time doses of low levels of nicotine. Both methods reduce the craving for cigarettes. If you have nausea, vomiting, dizziness, weakness, or a fast heartbeat, stop using these products. See your provider.   Ask about prescription medicine  After reviewing your smoking patterns and past attempts to quit, your healthcare provider may offer a prescription medicine such as bupropion, varenicline, a nicotine inhaler, or nasal spray. Each has advantages and side effects. Your provider can review these with you.   Keep trying  Most smokers make many attempts at quitting before they succeed. It s important not to give up.   To learn more  For more on how to quit smoking, try these resources:     www.cdc.gov/tobacco/quit_smoking/ 800-QUIT-NOW (883-215-5050)    www.smokefree.gov 877-44U-QUIT (763-302-2484)    www.lung.org/stop-smoking/ 800-LUNGUSA (124-389-3140)  [employer] last reviewed this educational content on 12/1/2019 2000-2021 [employer]. All rights reserved. This information is not intended as a substitute for professional medical care. Always follow your healthcare professional's instructions.

## 2022-11-15 ENCOUNTER — APPOINTMENT (OUTPATIENT)
Dept: GENERAL RADIOLOGY | Facility: HOSPITAL | Age: 62
End: 2022-11-15
Attending: PHYSICIAN ASSISTANT

## 2022-11-15 ENCOUNTER — HOSPITAL ENCOUNTER (EMERGENCY)
Facility: HOSPITAL | Age: 62
Discharge: HOME | End: 2022-11-15
Attending: PHYSICIAN ASSISTANT | Admitting: PHYSICIAN ASSISTANT

## 2022-11-15 VITALS
TEMPERATURE: 97.4 F | HEART RATE: 79 BPM | SYSTOLIC BLOOD PRESSURE: 133 MMHG | DIASTOLIC BLOOD PRESSURE: 65 MMHG | OXYGEN SATURATION: 97 % | RESPIRATION RATE: 16 BRPM

## 2022-11-15 DIAGNOSIS — S89.92XA KNEE INJURY, LEFT, INITIAL ENCOUNTER: Primary | ICD-10-CM

## 2022-11-15 PROCEDURE — G0463 HOSPITAL OUTPT CLINIC VISIT: HCPCS

## 2022-11-15 PROCEDURE — 99213 OFFICE O/P EST LOW 20 MIN: CPT | Performed by: PHYSICIAN ASSISTANT

## 2022-11-15 PROCEDURE — 73562 X-RAY EXAM OF KNEE 3: CPT | Mod: LT

## 2022-11-15 ASSESSMENT — ACTIVITIES OF DAILY LIVING (ADL): ADLS_ACUITY_SCORE: 35

## 2022-11-15 NOTE — ED TRIAGE NOTES
Pt presents with c/o left knee pain. Reports that she slid in the parking lot and ended up twisting knee. Incident happened this morning around 0700am. Pt was able to ambulate with a slight limp. Denies numbness/tingling. CMS intact. ROM with pain. Pt has not had any otc meds. Didn't have time to ice.        No

## 2022-11-15 NOTE — ED PROVIDER NOTES
"  History     Chief Complaint   Patient presents with     Knee Injury     HPI  Estella ORO Aas is a 62 year old female who presents to urgent care for evaluation of left knee injury.  Patient states that she was going into her work today at [employer] when she slipped in the parking lot on some ice causing a twisting injury to her left knee.  Patient states that she also had degenerative knee problems in this left knee and was eventually planning on having a knee replacement.  Patient has had increased pain since the injury.  Her pain is worsened with bearing weight and ambulation.    Allergies:  Allergies   Allergen Reactions     Cats      \"Cat/feline product derivatives\"       Problem List:    Patient Active Problem List    Diagnosis Date Noted     Arthritis of left knee 11/11/2022     Priority: Medium     Fall, subsequent encounter 10/19/2020     Priority: Medium     Closed head injury, subsequent encounter 10/19/2020     Priority: Medium     Significant closed head trauma within past 3 months 09/21/2020     Priority: Medium     Acute recurrent maxillary sinusitis 09/21/2020     Priority: Medium     Special screening for malignant neoplasms, colon 02/26/2020     Priority: Medium     Added automatically from request for surgery [id number]       RADHA (generalized anxiety disorder) 08/18/2019     Priority: Medium     Chronic idiopathic urticaria 08/18/2019     Priority: Medium     Advanced directives, counseling/discussion 02/23/2017     Priority: Medium     Advance Care Planning 2/23/2017: ACP Review of Chart / Resources Provided:  Reviewed chart for advance care plan.  Estella ORO Aas has no plan or code status on file. Discussed available resources and provided with information. Confirmed code status reflects current choices pending further ACP discussions.  Confirmed/documented legally designated decision makers.  Added by Gail Zambrano                Past Medical History:    Past Medical History:   Diagnosis " Date     Depressive disorder, not elsewhere classified 2008     Dysthymic disorder 2008       Past Surgical History:    Past Surgical History:   Procedure Laterality Date     AS TOTAL HIP ARTHROPLASTY Left 2017     basilar joint injection, left thumb  2017     COLONOSCOPY  2006     COLONOSCOPY N/A 2020    Procedure: COLONOSCOPY;  Surgeon: Bradly Jauregui MD;  Location: [facility] OR     hand      RT     HYSTERECTOMY TOTAL ABDOMINAL N/A     Cervix removed. Ovaries remain.      ZZC TOTAL HIP ARTHROPLASTY Left 2017       Family History:    Family History   Problem Relation Age of Onset     Hypertension Mother      Other - See Comments Father         Alzheimer's Disease     C.A.D. Father      Cancer Brother        Social History:  Marital Status:  Single [1]  Social History     Tobacco Use     Smoking status: Every Day     Packs/day: 0.50     Types: Cigarettes     Last attempt to quit: 10/1/2017     Years since quittin.1     Smokeless tobacco: Never     Tobacco comments:     Quit ; no passive exposure   Vaping Use     Vaping Use: Never used   Substance Use Topics     Alcohol use: Yes     Comment: Socially     Drug use: No        Medications:    calcium citrate (CITRACAL) 950 MG tablet  cholecalciferol (VITAMIN D3) 5000 UNITS TABS tablet  escitalopram (LEXAPRO) 20 MG tablet  modafinil (PROVIGIL) 100 MG tablet  zolpidem (AMBIEN) 5 MG tablet          Review of Systems   Musculoskeletal:        Left knee injury   All other systems reviewed and are negative.      Physical Exam   BP: 133/65  Pulse: 79  Temp: 97.4  F (36.3  C)  Resp: 16  SpO2: 97 %      Physical Exam  Vitals and nursing note reviewed.   Constitutional:       General: She is not in acute distress.     Appearance: Normal appearance. She is not ill-appearing or toxic-appearing.   Cardiovascular:      Rate and Rhythm: Regular rhythm.      Heart sounds: Normal heart sounds.   Pulmonary:      Breath sounds: Normal  breath sounds.   Musculoskeletal:      Comments: Patient has mild tenderness with palpation over medial aspect of left knee.  No obvious significant swelling.  Patient has pain with full extension and full flexion.  Strength 5 out of 5, CMS intact.   Neurological:      Mental Status: She is oriented to person, place, and time.         ED Course                 Procedures             Critical Care time:               Results for orders placed or performed during the hospital encounter of 11/15/22 (from the past 24 hour(s))   XR Knee Left 3 Views    Narrative    PROCEDURE:  XR KNEE LEFT 3 VIEWS    HISTORY: fell in parking lot and twisted knee. CMS intact. ROM present  with pain. Ambulates with slight limp. Denies hx of surgery but states  she was told she does need surgery on it.    COMPARISON:  Radiograph 6/21/2021, 5/19/2021    TECHNIQUE:  3 view left knee radiographs    FINDINGS:    No acute osseous abnormality. No joint effusion.    Osteophytes with medial tibiofemoral joint space loss, increased since  comparison. Mild patellar enthesopathy.    No patellar tilt or lateral subluxation.  Soft tissue is unremarkable.      Impression    IMPRESSION:   1. No acute osseous abnormality.  2. Mild to moderate medial compartment degenerative changes.    VICTORM ANUEL SUTTON MD         SYSTEM ID:  [id number]       Medications - No data to display    Assessments & Plan (with Medical Decision Making)   #1.  Left knee injury    Discussed exam findings as well as x-ray results with patient.  Patient has a knee brace and crutches to use at home.  She is encouraged to rest, elevate, and ice left knee.  She is referred to orthopedics for follow-up.  Tylenol or ibuprofen as directed for pain.  Any additional concerns patient to return to urgent care or follow-up with primary care provider.  Patient verbalized understanding and agreement of plan.    I have reviewed the nursing notes.    I have reviewed the findings, diagnosis, plan and need  for follow up with the patient.    New Prescriptions    No medications on file       Final diagnoses:   Knee injury, left, initial encounter       11/15/2022   [facility]     Demetrius Vincent PA-C  11/15/22 [id number]

## 2022-11-15 NOTE — ED TRIAGE NOTES
Patient presents with c/o left knee pain after slipping in parking lot at work and twisting knee this AM. Patient denies hitting head. Patient ambulated into triage.

## 2023-01-01 ENCOUNTER — MYC MEDICAL ADVICE (OUTPATIENT)
Dept: FAMILY MEDICINE | Facility: OTHER | Age: 63
End: 2023-01-01

## 2023-01-05 ENCOUNTER — TELEPHONE (OUTPATIENT)
Dept: MAMMOGRAPHY | Facility: OTHER | Age: 63
End: 2023-01-05

## 2023-01-05 ENCOUNTER — ANCILLARY PROCEDURE (OUTPATIENT)
Dept: MAMMOGRAPHY | Facility: OTHER | Age: 63
End: 2023-01-05
Attending: PHYSICIAN ASSISTANT
Payer: COMMERCIAL

## 2023-01-05 DIAGNOSIS — L50.1 CHRONIC IDIOPATHIC URTICARIA: ICD-10-CM

## 2023-01-05 DIAGNOSIS — F41.1 GAD (GENERALIZED ANXIETY DISORDER): ICD-10-CM

## 2023-01-05 DIAGNOSIS — Z12.31 ENCOUNTER FOR SCREENING MAMMOGRAM FOR BREAST CANCER: ICD-10-CM

## 2023-01-05 PROCEDURE — 77067 SCR MAMMO BI INCL CAD: CPT | Mod: TC | Performed by: RADIOLOGY

## 2023-01-05 PROCEDURE — 77063 BREAST TOMOSYNTHESIS BI: CPT | Mod: TC | Performed by: RADIOLOGY

## 2023-01-05 RX ORDER — ESCITALOPRAM OXALATE 20 MG/1
TABLET ORAL
Qty: 30 TABLET | Refills: 4 | Status: SHIPPED | OUTPATIENT
Start: 2023-01-05 | End: 2023-04-21

## 2023-02-07 ENCOUNTER — OFFICE VISIT (OUTPATIENT)
Dept: CHIROPRACTIC MEDICINE | Facility: OTHER | Age: 63
End: 2023-02-07
Payer: COMMERCIAL

## 2023-02-07 DIAGNOSIS — M99.01 SEGMENTAL AND SOMATIC DYSFUNCTION OF CERVICAL REGION: ICD-10-CM

## 2023-02-07 DIAGNOSIS — M99.03 SEGMENTAL AND SOMATIC DYSFUNCTION OF LUMBAR REGION: Primary | ICD-10-CM

## 2023-02-07 DIAGNOSIS — M54.50 ACUTE BILATERAL LOW BACK PAIN WITHOUT SCIATICA: ICD-10-CM

## 2023-02-07 DIAGNOSIS — M99.02 SEGMENTAL AND SOMATIC DYSFUNCTION OF THORACIC REGION: ICD-10-CM

## 2023-02-07 PROCEDURE — 98940 CHIROPRACT MANJ 1-2 REGIONS: CPT | Mod: AT | Performed by: CHIROPRACTOR

## 2023-03-19 ASSESSMENT — ENCOUNTER SYMPTOMS
EYE PAIN: 0
CONSTIPATION: 0
HEARTBURN: 0
CHILLS: 0
ARTHRALGIAS: 1
FREQUENCY: 1
COUGH: 1
DYSURIA: 0
PALPITATIONS: 0
ABDOMINAL PAIN: 0
DIZZINESS: 0
NERVOUS/ANXIOUS: 1
MYALGIAS: 1
NAUSEA: 0
PARESTHESIAS: 0
HEADACHES: 1
BREAST MASS: 0
SORE THROAT: 1
FEVER: 0
HEMATOCHEZIA: 0
DIARRHEA: 1
HEMATURIA: 0
SHORTNESS OF BREATH: 0
JOINT SWELLING: 1

## 2023-03-19 ASSESSMENT — PATIENT HEALTH QUESTIONNAIRE - PHQ9
SUM OF ALL RESPONSES TO PHQ QUESTIONS 1-9: 12
10. IF YOU CHECKED OFF ANY PROBLEMS, HOW DIFFICULT HAVE THESE PROBLEMS MADE IT FOR YOU TO DO YOUR WORK, TAKE CARE OF THINGS AT HOME, OR GET ALONG WITH OTHER PEOPLE: VERY DIFFICULT
SUM OF ALL RESPONSES TO PHQ QUESTIONS 1-9: 12

## 2023-03-20 ENCOUNTER — OFFICE VISIT (OUTPATIENT)
Dept: FAMILY MEDICINE | Facility: OTHER | Age: 63
End: 2023-03-20
Attending: PHYSICIAN ASSISTANT
Payer: COMMERCIAL

## 2023-03-20 VITALS
HEIGHT: 62 IN | SYSTOLIC BLOOD PRESSURE: 110 MMHG | RESPIRATION RATE: 16 BRPM | OXYGEN SATURATION: 98 % | WEIGHT: 166.9 LBS | HEART RATE: 75 BPM | BODY MASS INDEX: 30.71 KG/M2 | DIASTOLIC BLOOD PRESSURE: 78 MMHG | TEMPERATURE: 98.2 F

## 2023-03-20 DIAGNOSIS — F33.1 MAJOR DEPRESSIVE DISORDER, RECURRENT EPISODE, MODERATE (H): ICD-10-CM

## 2023-03-20 DIAGNOSIS — F41.1 GAD (GENERALIZED ANXIETY DISORDER): ICD-10-CM

## 2023-03-20 DIAGNOSIS — Z73.0 BURNOUT OF CAREGIVER: ICD-10-CM

## 2023-03-20 DIAGNOSIS — G47.00 PERSISTENT INSOMNIA: ICD-10-CM

## 2023-03-20 DIAGNOSIS — R05.3 PERSISTENT COUGH: ICD-10-CM

## 2023-03-20 DIAGNOSIS — Z01.419 WELL WOMAN EXAM: Primary | ICD-10-CM

## 2023-03-20 DIAGNOSIS — M25.50 MULTIPLE JOINT PAIN: ICD-10-CM

## 2023-03-20 PROCEDURE — 99396 PREV VISIT EST AGE 40-64: CPT | Performed by: PHYSICIAN ASSISTANT

## 2023-03-20 PROCEDURE — 99214 OFFICE O/P EST MOD 30 MIN: CPT | Mod: 25 | Performed by: PHYSICIAN ASSISTANT

## 2023-03-20 RX ORDER — CLONAZEPAM 0.5 MG/1
0.5 TABLET ORAL 2 TIMES DAILY
Qty: 60 TABLET | Refills: 1 | Status: SHIPPED | OUTPATIENT
Start: 2023-03-20 | End: 2023-04-21

## 2023-03-20 RX ORDER — ARIPIPRAZOLE 2 MG/1
2 TABLET ORAL DAILY
Qty: 30 TABLET | Refills: 3 | Status: SHIPPED | OUTPATIENT
Start: 2023-03-20 | End: 2023-07-28

## 2023-03-20 RX ORDER — ZOLPIDEM TARTRATE 10 MG/1
10 TABLET ORAL
Qty: 30 TABLET | Refills: 3 | Status: SHIPPED | OUTPATIENT
Start: 2023-03-20 | End: 2023-04-21 | Stop reason: ALTCHOICE

## 2023-03-20 ASSESSMENT — ENCOUNTER SYMPTOMS
HEARTBURN: 0
FEVER: 0
DYSURIA: 0
HEMATURIA: 0
HEADACHES: 1
SORE THROAT: 1
NAUSEA: 0
FREQUENCY: 1
EYE PAIN: 0
COUGH: 1
HEMATOCHEZIA: 0
JOINT SWELLING: 1
PARESTHESIAS: 0
NERVOUS/ANXIOUS: 1
SHORTNESS OF BREATH: 0
ARTHRALGIAS: 1
MYALGIAS: 1
ABDOMINAL PAIN: 0
DIARRHEA: 1
PALPITATIONS: 0
CONSTIPATION: 0
BREAST MASS: 0
DIZZINESS: 0
CHILLS: 0

## 2023-03-20 ASSESSMENT — PAIN SCALES - GENERAL: PAINLEVEL: SEVERE PAIN (6)

## 2023-03-20 NOTE — PROGRESS NOTES
SUBJECTIVE:   CC: Noman is an 62 year old who presents for preventive health visit.   Patient has been advised of split billing requirements and indicates understanding: Yes  Healthy Habits:     Getting at least 3 servings of Calcium per day:  Yes    Bi-annual eye exam:  Yes    Dental care twice a year:  NO    Sleep apnea or symptoms of sleep apnea:  None    Diet:  Regular (no restrictions)    Frequency of exercise:  2-3 days/week    Duration of exercise:  30-45 minutes    Taking medications regularly:  Yes    Medication side effects:  None    PHQ-2 Total Score: 4              Today's PHQ-2 Score:   PHQ-2 (  [employer]) 3/19/2023   Q1: Little interest or pleasure in doing things 2   Q2: Feeling down, depressed or hopeless 2   PHQ-2 Score 4   PHQ-2 Total Score (12-17 Years)- Positive if 3 or more points; Administer PHQ-A if positive -   Q1: Little interest or pleasure in doing things More than half the days   Q2: Feeling down, depressed or hopeless More than half the days   PHQ-2 Score 4       Have you ever done Advance Care Planning? (For example, a Health Directive, POLST, or a discussion with a medical provider or your loved ones about your wishes): No, advance care planning information given to patient to review.  Patient declined advance care planning discussion at this time.    Social History     Tobacco Use     Smoking status: Every Day     Packs/day: 0.50     Types: Cigarettes     Last attempt to quit: 10/1/2017     Years since quittin.4     Smokeless tobacco: Never     Tobacco comments:     Quit ; no passive exposure   Substance Use Topics     Alcohol use: Yes     Comment: Socially         Alcohol Use 3/19/2023   Prescreen: >3 drinks/day or >7 drinks/week? No       Reviewed orders with patient.  Reviewed health maintenance and updated orders accordingly - Yes  Lab work is in process  Labs reviewed in EPIC  BP Readings from Last 3 Encounters:   23 110/78   11/15/22 133/65   22 102/80     Wt Readings from Last 3 Encounters:   23 75.7 kg (166 lb 14.4 oz)   22 73.9 kg (163 lb)   22 73 kg (161 lb)                  Patient Active Problem List   Diagnosis     Advanced directives, counseling/discussion     RADHA (generalized anxiety disorder)     Chronic idiopathic urticaria     Special screening for malignant neoplasms, colon     Significant closed head trauma within past 3 months     Acute recurrent maxillary sinusitis     Fall, subsequent encounter     Closed head injury, subsequent encounter     Arthritis of left knee     Past Surgical History:   Procedure Laterality Date     AS TOTAL HIP ARTHROPLASTY Left 2017     basilar joint injection, left thumb  2017     COLONOSCOPY  2006     COLONOSCOPY N/A 2020    Procedure: COLONOSCOPY;  Surgeon: Bradly Jauregui MD;  Location: [facility]     hand      RT     HYSTERECTOMY TOTAL ABDOMINAL N/A     Cervix removed. Ovaries remain.      ZZC TOTAL HIP ARTHROPLASTY Left 2017       Social History     Tobacco Use     Smoking status: Every Day     Packs/day: 0.50     Types: Cigarettes     Last attempt to quit: 10/1/2017     Years since quittin.4     Smokeless tobacco: Never     Tobacco comments:     Quit ; no passive exposure   Substance Use Topics     Alcohol use: Yes     Comment: Socially     Family History   Problem Relation Age of Onset     Hypertension Mother      Other - See Comments Father         Alzheimer's Disease     C.A.D. Father      Cancer Brother          Current Outpatient Medications   Medication Sig Dispense Refill     ARIPiprazole (ABILIFY) 2 MG tablet Take 1 tablet (2 mg) by mouth daily 30 tablet 3     cholecalciferol (VITAMIN D3) 5000 UNITS TABS tablet Take 10,000 Units by mouth daily       escitalopram (LEXAPRO) 20 MG tablet TAKE 1 TABLET BY MOUTH ONCE DAILY. 30 tablet 4     modafinil (PROVIGIL) 100 MG tablet TAKE 1 TABLET BY MOUTH ONCE DAILY. 30 tablet 3     zolpidem (AMBIEN) 5 MG  "tablet Take 1 tablet (5 mg) by mouth nightly as needed for sleep 30 tablet 3     Allergies   Allergen Reactions     Cats      \"Cat/feline product derivatives\"     Recent Labs   Lab Test 22  0932 21  1422 19  1220 17  1413   A1C  --  5.3  --   --    LDL  --   --  129*  --    HDL  --   --  72  --    TRIG  --   --  127  --    ALT 15  --  17 20   CR 0.70 0.70 0.65 0.64   GFRESTIMATED >90 >90 >90 >90  Non African American GFR Calc     GFRESTBLACK  --   --  >90 >90  African American GFR Calc     POTASSIUM 3.6 3.7 3.8 4.0   TSH 3.04  --   --   --         Breast Cancer Screening:    Breast CA Risk Assessment (FHS-7) 3/19/2023   Do you have a family history of breast, colon, or ovarian cancer? No / Unknown         Mammogram Screening: Recommended mammography every 1-2 years with patient discussion and risk factor consideration  Pertinent mammograms are reviewed under the imaging tab.    History of abnormal Pap smear: Last 3 Pap and HPV Results:       Reviewed and updated as needed this visit by clinical staff   Tobacco  Allergies  Meds              Reviewed and updated as needed this visit by Provider                   Past Medical History:   Diagnosis Date     Depressive disorder, not elsewhere classified 2008     Dysthymic disorder 2008      Past Surgical History:   Procedure Laterality Date     AS TOTAL HIP ARTHROPLASTY Left 2017     basilar joint injection, left thumb  2017     COLONOSCOPY  2006     COLONOSCOPY N/A 2020    Procedure: COLONOSCOPY;  Surgeon: Bradly Jauregui MD;  Location: [facility]     hand      RT     HYSTERECTOMY TOTAL ABDOMINAL N/A     Cervix removed. Ovaries remain.      ZZC TOTAL HIP ARTHROPLASTY Left 2017     OB History    Para Term  AB Living   4 4 4 0 0 0   SAB IAB Ectopic Multiple Live Births   0 0 0 0 0      # Outcome Date GA Lbr Criilo/2nd Weight Sex Delivery Anes PTL Lv   4 Term            3 Term          " "  2 Term            1 Term                Review of Systems   Constitutional: Negative for chills and fever.   HENT: Positive for sore throat. Negative for congestion, ear pain and hearing loss.    Eyes: Negative for pain and visual disturbance.   Respiratory: Positive for cough. Negative for shortness of breath.    Cardiovascular: Negative for chest pain, palpitations and peripheral edema.   Gastrointestinal: Positive for diarrhea. Negative for abdominal pain, constipation, heartburn, hematochezia and nausea.   Breasts:  Negative for tenderness, breast mass and discharge.   Genitourinary: Positive for frequency. Negative for dysuria, genital sores, hematuria, pelvic pain, urgency, vaginal bleeding and vaginal discharge.   Musculoskeletal: Positive for arthralgias, joint swelling and myalgias.   Skin: Negative for rash.   Neurological: Positive for headaches. Negative for dizziness and paresthesias.   Psychiatric/Behavioral: Positive for mood changes. The patient is nervous/anxious.      OBJECTIVE:   /78   Pulse 75   Temp 98.2  F (36.8  C) (Tympanic)   Resp 16   Ht 1.562 m (5' 1.5\")   Wt 75.7 kg (166 lb 14.4 oz)   SpO2 98%   BMI 31.02 kg/m    Physical Exam  GENERAL: healthy, alert and no distress  EYES: Eyes grossly normal to inspection, PERRL and conjunctivae and sclerae normal  HENT: ear canals and TM's normal, nose and mouth without ulcers or lesions  NECK: no adenopathy, no asymmetry, masses, or scars and thyroid normal to palpation  RESP: lungs clear to auscultation - no rales, rhonchi or wheezes  BREAST: normal without masses, tenderness or nipple discharge and no palpable axillary masses or adenopathy  CV: regular rate and rhythm, normal S1 S2, no S3 or S4, no murmur, click or rub, no peripheral edema and peripheral pulses strong  ABDOMEN: soft, nontender, no hepatosplenomegaly, no masses and bowel sounds normal  MS: no gross musculoskeletal defects noted, no edema  SKIN: no suspicious lesions or " rashes  NEURO: Normal strength and tone, mentation intact and speech normal  PSYCH: she is burned out.  She can handle nothing and doesn't want to talk to anyone.   She is overwhelmed by the slightest thing.   PSYCH:   LYMPH: no cervical, supraclavicular, axillary, or inguinal adenopathy    Diagnostic Test Results:  Labs reviewed in Epic  No results found for this or any previous visit (from the past 24 hour(s)).    ASSESSMENT/PLAN:   (Z01.419) Well woman exam  (primary encounter diagnosis)  Comment: She would like to make sure there is nothing medically wrong with her.  She is extremely unhappy.  She has burnt out of her job finds no meseret in life and playing just does not care.  Apathy has been something that is been very bothersome for her.  Multiple medications have been tried and they really have not been helping she has been referred to see mental health many times at this point she states she will follow through.  All labs will be completed.  Plan: Lipid Profile (Chol, Trig, HDL, LDL calc), CBC         with platelets and differential            (F33.1) Major depressive disorder, recurrent episode, moderate (H)  Comment: Add Abilify 2 mg see her back in 4 weeks.  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  She at this point can bear hardly bear to get out of bed in the morning to go to work.  Cannot find anything doing her job that she finds enjoyable she does try to hide from people but she is able to get her work done.  Discussion on chuyita  Plan: Adult Mental Health  Referral            (F41.1) RADHA (generalized anxiety disorder)  Comment: Severe anxiety to the point of panic at times.  Benzodiazepines have not been helpful I have made a referral to mental health [facility] med.  They are doing some ketamine and other new therapies.  I think at this point  we need to address that and then consider an ROB from work  Plan: Adult Mental Health  Referral            (M25.50) Multiple joint pain  Comment: We just did testing less than 6 months ago.  I do think she is has significant somatizations.  She did we did cancel all of the rheumatology labs as she declined them today  Plan: CRP, inflammation, TSH with free T4 reflex,         CANCELED: Rheumatoid factor, CANCELED: DNA         double stranded antibodies            (R05.3) Persistent cough  Comment: Constant persistent cough over the last year she would like to see ENT has a chronic feeling of something in her throat.  PPIs have not been of benefit.  Plan: Adult ENT  Referral              Patient has been advised of split billing requirements and indicates understanding: No      COUNSELING:  Reviewed preventive health counseling, as reflected in patient instructions       Regular exercise       Healthy diet/nutrition       Vision screening       Hearing screening       Advance Care Planning        She reports that she has been smoking cigarettes. She has been smoking an average of .5 packs per day. She has never used smokeless tobacco.  Nicotine/Tobacco Cessation Plan:   Information offered: Patient not interested at this time          FELIX Joe  [facility] [place] [facility] [place]  Answers for HPI/ROS submitted by the patient on 3/19/2023  If you checked off any problems, how difficult have these problems made it for you to do your work, take care of things at home, or get along with other people?: Very difficult  PHQ9 TOTAL SCORE: 12

## 2023-03-21 ENCOUNTER — MYC MEDICAL ADVICE (OUTPATIENT)
Dept: FAMILY MEDICINE | Facility: OTHER | Age: 63
End: 2023-03-21

## 2023-04-21 ENCOUNTER — OFFICE VISIT (OUTPATIENT)
Dept: FAMILY MEDICINE | Facility: OTHER | Age: 63
End: 2023-04-21
Attending: PHYSICIAN ASSISTANT
Payer: COMMERCIAL

## 2023-04-21 VITALS
BODY MASS INDEX: 30.42 KG/M2 | RESPIRATION RATE: 16 BRPM | HEIGHT: 62 IN | WEIGHT: 165.3 LBS | DIASTOLIC BLOOD PRESSURE: 76 MMHG | TEMPERATURE: 98.2 F | SYSTOLIC BLOOD PRESSURE: 108 MMHG | HEART RATE: 81 BPM | OXYGEN SATURATION: 94 %

## 2023-04-21 DIAGNOSIS — L50.1 CHRONIC IDIOPATHIC URTICARIA: ICD-10-CM

## 2023-04-21 DIAGNOSIS — G47.10 HYPERSOMNOLENCE DISORDER, PERSISTENT: ICD-10-CM

## 2023-04-21 DIAGNOSIS — Z01.419 WELL WOMAN EXAM: ICD-10-CM

## 2023-04-21 DIAGNOSIS — M25.50 MULTIPLE JOINT PAIN: Primary | ICD-10-CM

## 2023-04-21 DIAGNOSIS — F41.1 GAD (GENERALIZED ANXIETY DISORDER): ICD-10-CM

## 2023-04-21 LAB
ALBUMIN SERPL BCG-MCNC: 4.2 G/DL (ref 3.5–5.2)
ALP SERPL-CCNC: 86 U/L (ref 35–104)
ALT SERPL W P-5'-P-CCNC: 12 U/L (ref 10–35)
ANION GAP SERPL CALCULATED.3IONS-SCNC: 10 MMOL/L (ref 7–15)
AST SERPL W P-5'-P-CCNC: 18 U/L (ref 10–35)
BILIRUB SERPL-MCNC: 0.2 MG/DL
BUN SERPL-MCNC: 9.8 MG/DL (ref 8–23)
CALCIUM SERPL-MCNC: 9.7 MG/DL (ref 8.8–10.2)
CHLORIDE SERPL-SCNC: 104 MMOL/L (ref 98–107)
CREAT SERPL-MCNC: 0.73 MG/DL (ref 0.51–0.95)
DEPRECATED HCO3 PLAS-SCNC: 27 MMOL/L (ref 22–29)
GFR SERPL CREATININE-BSD FRML MDRD: >90 ML/MIN/1.73M2
GLUCOSE SERPL-MCNC: 117 MG/DL (ref 70–99)
POTASSIUM SERPL-SCNC: 3.9 MMOL/L (ref 3.4–5.3)
PROT SERPL-MCNC: 6.9 G/DL (ref 6.4–8.3)
SODIUM SERPL-SCNC: 141 MMOL/L (ref 136–145)

## 2023-04-21 PROCEDURE — 80053 COMPREHEN METABOLIC PANEL: CPT | Performed by: PHYSICIAN ASSISTANT

## 2023-04-21 PROCEDURE — 36415 COLL VENOUS BLD VENIPUNCTURE: CPT | Performed by: PHYSICIAN ASSISTANT

## 2023-04-21 PROCEDURE — 99213 OFFICE O/P EST LOW 20 MIN: CPT | Performed by: PHYSICIAN ASSISTANT

## 2023-04-21 RX ORDER — MODAFINIL 100 MG/1
100 TABLET ORAL DAILY
Qty: 30 TABLET | Refills: 3 | Status: SHIPPED | OUTPATIENT
Start: 2023-04-21 | End: 2023-09-06

## 2023-04-21 RX ORDER — ESCITALOPRAM OXALATE 20 MG/1
20 TABLET ORAL DAILY
Qty: 30 TABLET | Refills: 4 | Status: SHIPPED | OUTPATIENT
Start: 2023-04-21 | End: 2023-09-06

## 2023-04-21 RX ORDER — CLONAZEPAM 0.5 MG/1
0.5 TABLET ORAL 2 TIMES DAILY
Qty: 60 TABLET | Refills: 1 | Status: SHIPPED | OUTPATIENT
Start: 2023-04-21 | End: 2023-07-28

## 2023-04-21 ASSESSMENT — PAIN SCALES - GENERAL: PAINLEVEL: MILD PAIN (3)

## 2023-04-21 NOTE — PROGRESS NOTES
"  Assessment & Plan     Multiple joint pain  She is needing her labs done. Has joint pain and fatigue.   - Comprehensive metabolic panel (BMP + Alb, Alk Phos, ALT, AST, Total. Bili, TP); Future    RADHA (generalized anxiety disorder)  She is doing better. Sleeping better an getting things done. Has good sleep wake cycle.   - clonazePAM (KLONOPIN) 0.5 MG tablet; Take 1 tablet (0.5 mg) by mouth 2 times daily  - escitalopram (LEXAPRO) 20 MG tablet; Take 1 tablet (20 mg) by mouth daily    Chronic idiopathic urticaria  She is given a refill. Good dose.   - escitalopram (LEXAPRO) 20 MG tablet; Take 1 tablet (20 mg) by mouth daily    Hypersomnolence disorder, persistent  She will be given refill. Helps her focus and stay awake.    - modafinil (PROVIGIL) 100 MG tablet; Take 1 tablet (100 mg) by mouth daily    Review of external notes as documented elsewhere in note  Ordering of each unique test  Prescription drug management  10  minutes spent by me on the date of the encounter doing chart review, history and exam, documentation and further activities per the note       BMI:   Estimated body mass index is 30.73 kg/m  as calculated from the following:    Height as of this encounter: 1.562 m (5' 1.5\").    Weight as of this encounter: 75 kg (165 lb 4.8 oz).   Weight management plan: Discussed healthy diet and exercise guidelines    See Patient Instructions    No follow-ups on file.    FELIX Joe  [facility] - DORA Tineo is a 62 year old, presenting for the following health issues:  Depression, Anxiety, and Pain         View : No data to display.              HPI     Depression and Anxiety Follow-Up    How are you doing with your depression since your last visit? Improved slightly    How are you doing with your anxiety since your last visit?  No change    Are you having other symptoms that might be associated with depression or anxiety? Yes:  itching, scratching  break outs    Have you " had a significant life event? No     Do you have any concerns with your use of alcohol or other drugs? No    Social History     Tobacco Use     Smoking status: Every Day     Packs/day: 0.50     Types: Cigarettes     Last attempt to quit: 10/1/2017     Years since quittin.5     Smokeless tobacco: Never     Tobacco comments:     Quit ; no passive exposure   Vaping Use     Vaping status: Never Used   Substance Use Topics     Alcohol use: Yes     Comment: Socially     Drug use: No         2021     1:00 PM 2022     8:00 AM 3/19/2023     8:58 AM   PHQ   PHQ-9 Total Score 0 9 12   Q9: Thoughts of better off dead/self-harm past 2 weeks Not at all Not at all Not at all         2021     8:00 AM 2021     1:00 PM 2022     8:00 AM   RADHA-7 SCORE   Total Score 0 0 9         3/19/2023     8:58 AM   Last PHQ-9   1.  Little interest or pleasure in doing things 2   2.  Feeling down, depressed, or hopeless 2   3.  Trouble falling or staying asleep, or sleeping too much 3   4.  Feeling tired or having little energy 3   5.  Poor appetite or overeating 2   6.  Feeling bad about yourself 0   7.  Trouble concentrating 0   8.  Moving slowly or restless 0   Q9: Thoughts of better off dead/self-harm past 2 weeks 0   PHQ-9 Total Score 12         2022     8:00 AM   RADHA-7    1. Feeling nervous, anxious, or on edge 1   2. Not being able to stop or control worrying 2   3. Worrying too much about different things 3   4. Trouble relaxing 1   5. Being so restless that it is hard to sit still 0   6. Becoming easily annoyed or irritable 2   7. Feeling afraid, as if something awful might happen 0   RADHA-7 Total Score 9   If you checked any problems, how difficult have they made it for you to do your work, take care of things at home, or get along with other people? Somewhat difficult       Suicide Assessment Five-step Evaluation and Treatment (SAFE-T)      Pain History:  When did you first notice your pain? Ongoing  "for over a year   Have you seen this provider for your pain in the past?   Yes   Where in your body do you have pain? Knees, Right elbow started hurting, joints  Are you seeing anyone else for your pain? No        11/5/2021     1:00 PM 11/11/2022     8:00 AM 3/19/2023     8:58 AM   PHQ-9 SCORE   PHQ-9 Total Score MyChart   12 (Moderate depression)   PHQ-9 Total Score 0 9 12           5/19/2021     8:00 AM 11/5/2021     1:00 PM 11/11/2022     8:00 AM   RADHA-7 SCORE   Total Score 0 0 9               Chronic Pain Follow Up:    Location of pain: multiple joints.   Analgesia/pain control:    - Recent changes:  Elbow is worse. Has been doing some home projects may have tweekd it.     - Overall control: Tolerable with discomfort    - Current treatments: tylenol. Getting some labs.    Adherence:     - Do you ever take more pain medicine than prescribed? Yes:     - When did you take your last dose of pain medicine?  Today.    Adverse effects: Yes:    PDMP Review       Value Time User    State PDMP site checked  Yes 11/5/2021  2:05 PM Mia Villagomez PA        Last CSA Agreement:   CSA -- Patient Level:    CSA: None found at the patient level.       Last UDS:               Review of Systems   Constitutional, HEENT, cardiovascular, pulmonary, gi and gu systems are negative, except as otherwise noted.      Objective    /76   Pulse 81   Temp 98.2  F (36.8  C) (Tympanic)   Resp 16   Ht 1.562 m (5' 1.5\")   Wt 75 kg (165 lb 4.8 oz)   SpO2 94%   BMI 30.73 kg/m    Body mass index is 30.73 kg/m .  Physical Exam   GENERAL: healthy, alert and no distress  EYES: Eyes grossly normal to inspection, PERRL and conjunctivae and sclerae normal  HENT: ear canals and TM's normal, nose and mouth without ulcers or lesions  NECK: no adenopathy, no asymmetry, masses, or scars and thyroid normal to palpation  RESP: lungs clear to auscultation - no rales, rhonchi or wheezes  CV: regular rate and rhythm, normal S1 S2, no S3 or S4, no " murmur, click or rub, no peripheral edema and peripheral pulses strong  ABDOMEN: soft, nontender, no hepatosplenomegaly, no masses and bowel sounds normal  MS: no gross musculoskeletal defects noted, no edema    Office Visit on 08/25/2022   Component Date Value Ref Range Status     Sodium 08/25/2022 139  133 - 144 mmol/L Final     Potassium 08/25/2022 3.6  3.4 - 5.3 mmol/L Final     Chloride 08/25/2022 107  94 - 109 mmol/L Final     Carbon Dioxide (CO2) 08/25/2022 29  20 - 32 mmol/L Final     Anion Gap 08/25/2022 3  3 - 14 mmol/L Final     Urea Nitrogen 08/25/2022 5 (L)  7 - 30 mg/dL Final     Creatinine 08/25/2022 0.70  0.52 - 1.04 mg/dL Final     Calcium 08/25/2022 9.3  8.5 - 10.1 mg/dL Final     Glucose 08/25/2022 92  70 - 99 mg/dL Final     Alkaline Phosphatase 08/25/2022 83  40 - 150 U/L Final     AST 08/25/2022 16  0 - 45 U/L Final     ALT 08/25/2022 15  0 - 50 U/L Final     Protein Total 08/25/2022 7.4  6.8 - 8.8 g/dL Final     Albumin 08/25/2022 3.8  3.4 - 5.0 g/dL Final     Bilirubin Total 08/25/2022 0.4  0.2 - 1.3 mg/dL Final     GFR Estimate 08/25/2022 >90  >60 mL/min/1.73m2 Final    Effective December 21, 2021 eGFRcr in adults is calculated using the 2021 CKD-EPI creatinine equation which includes age and gender (Sincere et al., NEJM, DOI: 10.1056/KNZRog6290275)     TSH 08/25/2022 3.04  0.40 - 4.00 mU/L Final     WBC Count 08/25/2022 6.7  4.0 - 11.0 10e3/uL Final     RBC Count 08/25/2022 4.65  3.80 - 5.20 10e6/uL Final     Hemoglobin 08/25/2022 11.3 (L)  11.7 - 15.7 g/dL Final     Hematocrit 08/25/2022 36.4  35.0 - 47.0 % Final     MCV 08/25/2022 78  78 - 100 fL Final     MCH 08/25/2022 24.3 (L)  26.5 - 33.0 pg Final     MCHC 08/25/2022 31.0 (L)  31.5 - 36.5 g/dL Final     RDW 08/25/2022 14.4  10.0 - 15.0 % Final     Platelet Count 08/25/2022 344  150 - 450 10e3/uL Final     % Neutrophils 08/25/2022 52  % Final     % Lymphocytes 08/25/2022 38  % Final     % Monocytes 08/25/2022 6  % Final     % Eosinophils  "08/25/2022 3  % Final     % Basophils 08/25/2022 1  % Final     % Immature Granulocytes 08/25/2022 0  % Final     NRBCs per 100 WBC 08/25/2022 0  <1 /100 Final     Absolute Neutrophils 08/25/2022 3.5  1.6 - 8.3 10e3/uL Final     Absolute Lymphocytes 08/25/2022 2.6  0.8 - 5.3 10e3/uL Final     Absolute Monocytes 08/25/2022 0.4  0.0 - 1.3 10e3/uL Final     Absolute Eosinophils 08/25/2022 0.2  0.0 - 0.7 10e3/uL Final     Absolute Basophils 08/25/2022 0.1  0.0 - 0.2 10e3/uL Final     Absolute Immature Granulocytes 08/25/2022 0.0  <=0.4 10e3/uL Final     Absolute NRBCs 08/25/2022 0.0  10e3/uL Final     Lead Venous Blood 08/25/2022 <2.0  <=4.9 ug/dL Final    Comment: INTERPRETIVE INFORMATION: Lead, Blood (Venous)    Elevated results may be due to skin or collection-related   contamination, including the use of a noncertified   lead-free tube. If contamination concerns exist due to   elevated levels of blood lead, confirmation with a second   specimen collected in a certified lead-free tube is   recommended.    Information sources for blood lead reference intervals and   interpretive comments include the CDC's \"Childhood Lead   Poisoning Prevention: Recommended Actions Based on Blood   Lead Level\" and the \"Adult Blood Lead Epidemiology and   Surveillance: Reference Blood Lead Levels (BLLs) for Adults   in the [place].\" Thresholds and time intervals for retesting,   medical evaluation, and response vary by state and   regulatory body. Contact your State Department of Health   and/or applicable regulatory agency for specific guidance   on medical management recommendations.    This test was developed and its performance characteristics   determined by                            [facility]. It has not been cleared or   approved by the U.S. Food and Drug Administration. This   test was performed in a CLIA-certified laboratory and is   intended for clinical purposes.         Group          Concentration   Comment    Children "       3.5-19.9 ug/dL  Children under the age of 6                                 years are the most vulnerable                                 to the harmful effects of                                  lead exposure. Environmental                                  investigation and exposure                                  history to identify potential                                 sources of lead. Biological                                  and nutritional monitoring                                 are recommended. Follow-up                                  blood lead monitoring is                                  recommended.                                20-44.9 ug/dL   Lead hazard reduction and                                                             prompt medical evaluation are                                 recommended. Contact a                                  Pediatric Environmental                                  Health Specialty Unit or                                  poison control center for                                  guidance.                   Greater than    Critical. Immediate medical                  44.9 ug/dL      evaluation, including                                  detailed neurological exam is                                 recommended. Consider                                  chelation therapy when                                 symptoms of lead toxicity   are                                  present. Contact a Pediatric                                  Environmental Health                                  Specialty Unit or poison                                  control center for                                  assistance.    Adult          5-19.9 ug/dL    Medical removal is                                                             recommended for pregnant                                  women or those who are trying                                 or may become  pregnant.                                  Adverse health effects are                                  possible. Reduced lead                                  exposure and increased blood                                  lead monitoring are                                  recommended.                    20-69.9 ug/dL   Adverse health effects are                                  indicated. Medical removal                                  from lead exposure is                                  required by OSHA if blood                                  lead level exceeds 50 ug/dL.                                 Prompt medical evaluation is                                 recommended.                    Greater than    Critical. Immediate medical                  69.9 ug/dL      evaluation is recommended.                                                             Consider chelation therapy                                 when symptoms of lead                                  toxicity are present.  Performed By: [facility]  [address] [place] [id number]  : Terrence Luna MD, PhD

## 2023-04-26 ENCOUNTER — OFFICE VISIT (OUTPATIENT)
Dept: OTOLARYNGOLOGY | Facility: OTHER | Age: 63
End: 2023-04-26
Attending: PHYSICIAN ASSISTANT
Payer: COMMERCIAL

## 2023-04-26 VITALS
HEART RATE: 87 BPM | HEIGHT: 62 IN | BODY MASS INDEX: 30.42 KG/M2 | DIASTOLIC BLOOD PRESSURE: 72 MMHG | OXYGEN SATURATION: 97 % | SYSTOLIC BLOOD PRESSURE: 116 MMHG | WEIGHT: 165.3 LBS | TEMPERATURE: 97.5 F

## 2023-04-26 DIAGNOSIS — R13.10 DYSPHAGIA, UNSPECIFIED TYPE: Primary | ICD-10-CM

## 2023-04-26 DIAGNOSIS — R05.3 PERSISTENT COUGH: ICD-10-CM

## 2023-04-26 DIAGNOSIS — R09.82 POST-NASAL DRIP: ICD-10-CM

## 2023-04-26 PROCEDURE — 99213 OFFICE O/P EST LOW 20 MIN: CPT | Mod: 25 | Performed by: NURSE PRACTITIONER

## 2023-04-26 PROCEDURE — 31575 DIAGNOSTIC LARYNGOSCOPY: CPT | Performed by: NURSE PRACTITIONER

## 2023-04-26 ASSESSMENT — PAIN SCALES - GENERAL: PAINLEVEL: MILD PAIN (3)

## 2023-04-26 NOTE — PATIENT INSTRUCTIONS
Thank you for allowing Kat Kim NP and our ENT team to participate in your care.  If your medications are too expensive, please give the nurse a call.  We can possibly change this medication.  If you have a scheduling or an appointment question please contact our Health Unit Coordinator at their direct line [phone].    Complete Barium Swallow    Daily Antihistamine    Use Danya Med Nasal Saline Twice Daily    Consider an inhaler in the future    Follow up with ENT if symptoms persist    Danya Med Nasal Saline  Use high volume danya med saline irrigation.  Use warm distilled water and 2 packets of the salt solution that comes with the bottle, dissolve in bottle up to 240 ml dennis.  Irrigate each side of your nose leaning over the sink, using 1/3 to 1/2 the volume of the bottle in each nostril every irrigation.  Irrigate 2 times daily and as needed.

## 2023-04-26 NOTE — PROGRESS NOTES
"Otolaryngology Note         Chief Complaint:     Patient presents with:  Consult: Persistent Cough; Referred by Mia Villagomez           History of Present Illness:     Estella ORO Aas is a 62 year old female seen today for a consistent cough with irritated feeling in her throat.  She reports she coughs and feels like she chokes a bit when she swallows.      Duration ~ 1 year  Character - gradually worsening  Nothing makes it worse  Heavy smoking makes it better  + smoking history, she has made multiple attempts at quitting.  Has multiple tools at home.  Quit for 17 years.  Smoking history - 15 - 40's - 1/2 ppd.  She started again at mid 50's.   Alcohol - no heavy use  Water - works on getting adequate water intake  Caffeine - 1 pot of coffee per day  Sinus symptoms - frequent allergy symptoms - drippy nose, runny nose.  She is able to breath throug her nose well.  No history of sinus infections  No history of past head or neck surgery  + concussion 3-4 years ago.  Head CT normal  + knee surgery completed 2-3 years ago, questions if cough started soon after that  No change in voice  No significant PMH  No shortness of breath  She hears a \"whistle in her throat\" at times when laying  No sander stridor  No hemoptysis  No pharyngitis  + dysphagia - sticks in the upper neck  No odynophagia  No weight loss  + otalgia - left, intermittent     She uses netti pot at times, makes head feel clearer         Medications:     Current Outpatient Rx   Medication Sig Dispense Refill     ARIPiprazole (ABILIFY) 2 MG tablet Take 1 tablet (2 mg) by mouth daily 30 tablet 3     clonazePAM (KLONOPIN) 0.5 MG tablet Take 1 tablet (0.5 mg) by mouth 2 times daily 60 tablet 1     escitalopram (LEXAPRO) 20 MG tablet Take 1 tablet (20 mg) by mouth daily 30 tablet 4     modafinil (PROVIGIL) 100 MG tablet Take 1 tablet (100 mg) by mouth daily 30 tablet 3            Allergies:     Allergies: Cats          Past Medical History:     Past Medical " "History:   Diagnosis Date     Depressive disorder, not elsewhere classified 2008     Dysthymic disorder 2008            Past Surgical History:     Past Surgical History:   Procedure Laterality Date     AS TOTAL HIP ARTHROPLASTY Left 2017     basilar joint injection, left thumb  2017     COLONOSCOPY  2006     COLONOSCOPY N/A 2020    Procedure: COLONOSCOPY;  Surgeon: Bradly Jauregui MD;  Location: [facility]     hand      RT     HYSTERECTOMY TOTAL ABDOMINAL N/A     Cervix removed. Ovaries remain.      ZZC TOTAL HIP ARTHROPLASTY Left 2017               Social History:     Social History     Tobacco Use     Smoking status: Every Day     Packs/day: 0.50     Types: Cigarettes     Last attempt to quit: 10/1/2017     Years since quittin.5     Smokeless tobacco: Never     Tobacco comments:     Quit ; no passive exposure   Vaping Use     Vaping status: Never Used   Substance Use Topics     Alcohol use: Yes     Comment: Socially     Drug use: No            Review of Systems:     ROS: See HPI         Physical Exam:     /72 (Cuff Size: Adult Regular)   Pulse 87   Temp 97.5  F (36.4  C) (Tympanic)   Ht 1.562 m (5' 1.5\")   Wt 75 kg (165 lb 4.8 oz)   SpO2 97%   BMI 30.73 kg/m      General - The patient is well nourished and well developed, and appears to have good nutritional status.  Alert and oriented to person and place, answers questions and cooperates with examination appropriately.   Head and Face - Normocephalic and atraumatic, with no gross asymmetry noted.  The facial nerve is intact, with strong symmetric movements.  Voice and Breathing - The patient was breathing comfortably without the use of accessory muscles. There was no wheezing, stridor. The patients voice was clear and strong, and had appropriate pitch and quality.  Ears - External ear normal. Canals are patent. Right tympanic membrane is intact without effusion, retraction or mass. Left " tympanic membrane is intact without effusion, retraction or mass.  Eyes - Extraocular movements intact, sclera were not icteric or injected.  Mouth - Examination of the oral cavity showed pink, healthy oral mucosa. Dentition in good condition. No lesions or ulcerations noted. The tongue was mobile and midline.  No masses or lesions noted on tongue with palpation and inspection.    Throat - The walls of the oropharynx were smooth, pink, moist, symmetric, and had no lesions or ulcerations.  The tonsillar pillars and soft palate were symmetric. The uvula was midline on elevation.    Neck - Normal midline excursion of the laryngotracheal complex during swallowing.  Normal ROM of neck with active movement.  Palpation of the occipital, submental, submandibular, internal jugular chain, and supraclavicular nodes did not demonstrate any abnormal lymph nodes or masses.  Palpation of the thyroid was soft and smooth, with no nodules or goiter appreciated.  The trachea was mobile and midline.  Nose - External contour is symmetric, no gross deflection or scars.  Nasal mucosa is pink and moist with no abnormal mucus.  The septum and turbinates were evaluated with nasal speculum, no polyps, masses, or purulence noted on examination.    Attempts at mirror laryngoscopy were not possible due to gag reflex.  Therefore I proceeded with a fiberoptic examination after informed consent.  First I sprayed both sides of the nose with a mixture of lidocaine and neosynephrine.  I then passed the scope through the nasal cavity.     Post nasal drip    The nasopharynx was mucosally covered and symmetric.  The eustachian tube openings were unobstructed.  Going further down I had a clear view of the base of tongue which had normal appearing lingual tonsillar tissue.  The base of tongue was free of lesions, and the vallecula was open.  The epiglottis was smooth and mucosally covered.  The supraglottic larynx was then clearly visualized.  The vocal  cords moved smoothly and symmetrically and were pearly white.  The pyriform sinuses were open and without sander mass or pooling of secretions upon valsalva, and the limited view of the postcricoid region did not show any lesions.  The patient tolerated the procedure well.         Assessment and Plan:       ICD-10-CM    1. Dysphagia, unspecified type  R13.10 XR Esophagram      2. Persistent cough  R05.3 Adult ENT  Referral     XR Esophagram      3. Post-nasal drip  R09.82         Consider allergy testing    Complete Barium Swallow    Start daily AH of choice    Use Danya Med Nasal Saline Twice Daily    Consider an inhaler in the future    Follow up with ENT if symptoms persist    Danya Med Nasal Saline  Use high volume danya med saline irrigation.  Use warm distilled water and 2 packets of the salt solution that comes with the bottle, dissolve in bottle up to 240 ml dennis.  Irrigate each side of your nose leaning over the sink, using 1/3 to 1/2 the volume of the bottle in each nostril every irrigation.  Irrigate 2 times daily and as needed.    Kat Kim NP-C  [facility] ENT

## 2023-04-26 NOTE — LETTER
"    4/26/2023         RE: Estella ORO Aas  [address]  [place] [address]        Dear Colleague,    Thank you for referring your patient, Estella ORO Aas, to the [facility] [place]. Please see a copy of my visit note below.    Otolaryngology Note         Chief Complaint:     Patient presents with:  Consult: Persistent Cough; Referred by Mia Villagomez           History of Present Illness:     Estella ORO Aas is a 62 year old female seen today for a consistent cough with irritated feeling in her throat.  She reports she coughs and feels like she chokes a bit when she swallows.      Duration ~ 1 year  Character - gradually worsening  Nothing makes it worse  Heavy smoking makes it better  + smoking history, she has made multiple attempts at quitting.  Has multiple tools at home.  Quit for 17 years.  Smoking history - 15 - 40's - 1/2 ppd.  She started again at mid 50's.   Alcohol - no heavy use  Water - works on getting adequate water intake  Caffeine - 1 pot of coffee per day  Sinus symptoms - frequent allergy symptoms - drippy nose, runny nose.  She is able to breath throug her nose well.  No history of sinus infections  No history of past head or neck surgery  + concussion 3-4 years ago.  Head CT normal  + knee surgery completed 2-3 years ago, questions if cough started soon after that  No change in voice  No significant PMH  No shortness of breath  She hears a \"whistle in her throat\" at times when laying  No sander stridor  No hemoptysis  No pharyngitis  + dysphagia - sticks in the upper neck  No odynophagia  No weight loss  + otalgia - left, intermittent     She uses netti pot at times, makes head feel clearer         Medications:     Current Outpatient Rx   Medication Sig Dispense Refill     ARIPiprazole (ABILIFY) 2 MG tablet Take 1 tablet (2 mg) by mouth daily 30 tablet 3     clonazePAM (KLONOPIN) 0.5 MG tablet Take 1 tablet (0.5 mg) by mouth 2 times daily 60 tablet 1     escitalopram (LEXAPRO) 20 MG " "tablet Take 1 tablet (20 mg) by mouth daily 30 tablet 4     modafinil (PROVIGIL) 100 MG tablet Take 1 tablet (100 mg) by mouth daily 30 tablet 3            Allergies:     Allergies: Cats          Past Medical History:     Past Medical History:   Diagnosis Date     Depressive disorder, not elsewhere classified 2008     Dysthymic disorder 2008            Past Surgical History:     Past Surgical History:   Procedure Laterality Date     AS TOTAL HIP ARTHROPLASTY Left 2017     basilar joint injection, left thumb  2017     COLONOSCOPY  2006     COLONOSCOPY N/A 2020    Procedure: COLONOSCOPY;  Surgeon: Bradly Jauregui MD;  Location: [facility]     hand      RT     HYSTERECTOMY TOTAL ABDOMINAL N/A     Cervix removed. Ovaries remain.      ZZC TOTAL HIP ARTHROPLASTY Left 2017               Social History:     Social History     Tobacco Use     Smoking status: Every Day     Packs/day: 0.50     Types: Cigarettes     Last attempt to quit: 10/1/2017     Years since quittin.5     Smokeless tobacco: Never     Tobacco comments:     Quit ; no passive exposure   Vaping Use     Vaping status: Never Used   Substance Use Topics     Alcohol use: Yes     Comment: Socially     Drug use: No            Review of Systems:     ROS: See HPI         Physical Exam:     /72 (Cuff Size: Adult Regular)   Pulse 87   Temp 97.5  F (36.4  C) (Tympanic)   Ht 1.562 m (5' 1.5\")   Wt 75 kg (165 lb 4.8 oz)   SpO2 97%   BMI 30.73 kg/m      General - The patient is well nourished and well developed, and appears to have good nutritional status.  Alert and oriented to person and place, answers questions and cooperates with examination appropriately.   Head and Face - Normocephalic and atraumatic, with no gross asymmetry noted.  The facial nerve is intact, with strong symmetric movements.  Voice and Breathing - The patient was breathing comfortably without the use of accessory muscles. There " was no wheezing, stridor. The patients voice was clear and strong, and had appropriate pitch and quality.  Ears - External ear normal. Canals are patent. Right tympanic membrane is intact without effusion, retraction or mass. Left tympanic membrane is intact without effusion, retraction or mass.  Eyes - Extraocular movements intact, sclera were not icteric or injected.  Mouth - Examination of the oral cavity showed pink, healthy oral mucosa. Dentition in good condition. No lesions or ulcerations noted. The tongue was mobile and midline.  No masses or lesions noted on tongue with palpation and inspection.    Throat - The walls of the oropharynx were smooth, pink, moist, symmetric, and had no lesions or ulcerations.  The tonsillar pillars and soft palate were symmetric. The uvula was midline on elevation.    Neck - Normal midline excursion of the laryngotracheal complex during swallowing.  Normal ROM of neck with active movement.  Palpation of the occipital, submental, submandibular, internal jugular chain, and supraclavicular nodes did not demonstrate any abnormal lymph nodes or masses.  Palpation of the thyroid was soft and smooth, with no nodules or goiter appreciated.  The trachea was mobile and midline.  Nose - External contour is symmetric, no gross deflection or scars.  Nasal mucosa is pink and moist with no abnormal mucus.  The septum and turbinates were evaluated with nasal speculum, no polyps, masses, or purulence noted on examination.    Attempts at mirror laryngoscopy were not possible due to gag reflex.  Therefore I proceeded with a fiberoptic examination after informed consent.  First I sprayed both sides of the nose with a mixture of lidocaine and neosynephrine.  I then passed the scope through the nasal cavity.     Post nasal drip    The nasopharynx was mucosally covered and symmetric.  The eustachian tube openings were unobstructed.  Going further down I had a clear view of the base of tongue which  had normal appearing lingual tonsillar tissue.  The base of tongue was free of lesions, and the vallecula was open.  The epiglottis was smooth and mucosally covered.  The supraglottic larynx was then clearly visualized.  The vocal cords moved smoothly and symmetrically and were pearly white.  The pyriform sinuses were open and without sander mass or pooling of secretions upon valsalva, and the limited view of the postcricoid region did not show any lesions.  The patient tolerated the procedure well.         Assessment and Plan:       ICD-10-CM    1. Dysphagia, unspecified type  R13.10 XR Esophagram      2. Persistent cough  R05.3 Adult ENT  Referral     XR Esophagram      3. Post-nasal drip  R09.82         Consider allergy testing    Complete Barium Swallow    Start daily AH of choice    Use Danya Med Nasal Saline Twice Daily    Consider an inhaler in the future    Follow up with ENT if symptoms persist    Danya Med Nasal Saline  Use high volume danya med saline irrigation.  Use warm distilled water and 2 packets of the salt solution that comes with the bottle, dissolve in bottle up to 240 ml dennis.  Irrigate each side of your nose leaning over the sink, using 1/3 to 1/2 the volume of the bottle in each nostril every irrigation.  Irrigate 2 times daily and as needed.    Kat BOLTON  [facility] ENT        Again, thank you for allowing me to participate in the care of your patient.        Sincerely,        Kat Kim NP

## 2023-07-25 DIAGNOSIS — F41.1 GAD (GENERALIZED ANXIETY DISORDER): ICD-10-CM

## 2023-07-25 DIAGNOSIS — F33.1 MAJOR DEPRESSIVE DISORDER, RECURRENT EPISODE, MODERATE (H): ICD-10-CM

## 2023-07-27 ENCOUNTER — MYC MEDICAL ADVICE (OUTPATIENT)
Dept: FAMILY MEDICINE | Facility: OTHER | Age: 63
End: 2023-07-27

## 2023-07-27 NOTE — TELEPHONE ENCOUNTER
ARIPiprazole (ABILIFY) 2 MG tablet 30 tablet 3 3/20/2023     clonazePAM (KLONOPIN) 0.5 MG tablet 60 tablet 1 4/21/2023         Last Office Visit: 04/21/23  Future Office visit:       Routing refill request to provider for review/approval because:

## 2023-07-28 RX ORDER — ARIPIPRAZOLE 2 MG/1
TABLET ORAL
Qty: 30 TABLET | Refills: 0 | Status: SHIPPED | OUTPATIENT
Start: 2023-07-28 | End: 2023-08-24

## 2023-07-28 RX ORDER — CLONAZEPAM 0.5 MG/1
TABLET ORAL
Qty: 60 TABLET | Refills: 0 | Status: SHIPPED | OUTPATIENT
Start: 2023-07-28 | End: 2023-08-24

## 2023-08-24 ENCOUNTER — MYC REFILL (OUTPATIENT)
Dept: FAMILY MEDICINE | Facility: OTHER | Age: 63
End: 2023-08-24

## 2023-08-24 DIAGNOSIS — F41.1 GAD (GENERALIZED ANXIETY DISORDER): ICD-10-CM

## 2023-08-24 DIAGNOSIS — F33.1 MAJOR DEPRESSIVE DISORDER, RECURRENT EPISODE, MODERATE (H): ICD-10-CM

## 2023-08-25 NOTE — TELEPHONE ENCOUNTER
clonazePAM (KLONOPIN) 0.5 MG tablet   Last Written Prescription Date:  7/28/2023  Last Fill Quantity: 60,   # refills: 0  Last Office Visit: 4/26/203  Future Office visit:    Next 5 appointments (look out 90 days)      Sep 28, 2023  3:30 PM  (Arrive by 3:15 PM)  SHORT with FELIX Davis  [facility] [address] [facility] [address]  [phone]             ARIPiprazole (ABILIFY) 2 MG tablet   Last Written Prescription Date:  7/28/2023  Last Fill Quantity: 30,   # refills: 0  Last Office Visit: 4/26/2023  Future Office visit:    Next 5 appointments (look out 90 days)      Sep 28, 2023  3:30 PM  (Arrive by 3:15 PM)  SHORT with FELIX Davis  [facility] ([facility] [address] [facility] [address]  [phone]

## 2023-08-28 RX ORDER — CLONAZEPAM 0.5 MG/1
0.5 TABLET ORAL 2 TIMES DAILY
Qty: 60 TABLET | Refills: 0 | Status: SHIPPED | OUTPATIENT
Start: 2023-08-28 | End: 2023-09-29

## 2023-08-28 RX ORDER — ARIPIPRAZOLE 2 MG/1
2 TABLET ORAL DAILY
Qty: 30 TABLET | Refills: 0 | Status: SHIPPED | OUTPATIENT
Start: 2023-08-28 | End: 2023-09-06

## 2023-09-06 ENCOUNTER — OFFICE VISIT (OUTPATIENT)
Dept: FAMILY MEDICINE | Facility: OTHER | Age: 63
End: 2023-09-06
Attending: PHYSICIAN ASSISTANT
Payer: COMMERCIAL

## 2023-09-06 VITALS
DIASTOLIC BLOOD PRESSURE: 72 MMHG | SYSTOLIC BLOOD PRESSURE: 108 MMHG | WEIGHT: 155.3 LBS | TEMPERATURE: 98 F | OXYGEN SATURATION: 94 % | BODY MASS INDEX: 28.87 KG/M2 | HEART RATE: 74 BPM

## 2023-09-06 DIAGNOSIS — G47.10 HYPERSOMNOLENCE DISORDER, PERSISTENT: Primary | ICD-10-CM

## 2023-09-06 DIAGNOSIS — F41.1 GAD (GENERALIZED ANXIETY DISORDER): ICD-10-CM

## 2023-09-06 DIAGNOSIS — F33.1 MAJOR DEPRESSIVE DISORDER, RECURRENT EPISODE, MODERATE (H): ICD-10-CM

## 2023-09-06 DIAGNOSIS — G47.00 PERSISTENT INSOMNIA: ICD-10-CM

## 2023-09-06 DIAGNOSIS — L50.1 CHRONIC IDIOPATHIC URTICARIA: ICD-10-CM

## 2023-09-06 PROCEDURE — 99213 OFFICE O/P EST LOW 20 MIN: CPT | Performed by: PHYSICIAN ASSISTANT

## 2023-09-06 RX ORDER — MODAFINIL 200 MG/1
200 TABLET ORAL DAILY
Qty: 30 TABLET | Refills: 1 | Status: SHIPPED | OUTPATIENT
Start: 2023-09-06 | End: 2023-09-06

## 2023-09-06 RX ORDER — ESCITALOPRAM OXALATE 20 MG/1
20 TABLET ORAL DAILY
Qty: 30 TABLET | Refills: 4 | Status: SHIPPED | OUTPATIENT
Start: 2023-09-06 | End: 2024-01-30

## 2023-09-06 RX ORDER — ARIPIPRAZOLE 2 MG/1
2 TABLET ORAL DAILY
Qty: 30 TABLET | Refills: 11 | Status: SHIPPED | OUTPATIENT
Start: 2023-09-06 | End: 2024-09-11

## 2023-09-06 RX ORDER — MODAFINIL 200 MG/1
200 TABLET ORAL DAILY
Qty: 30 TABLET | Refills: 1 | Status: SHIPPED | OUTPATIENT
Start: 2023-09-06 | End: 2023-11-05

## 2023-09-06 ASSESSMENT — PAIN SCALES - GENERAL: PAINLEVEL: NO PAIN (0)

## 2023-09-06 ASSESSMENT — PATIENT HEALTH QUESTIONNAIRE - PHQ9: SUM OF ALL RESPONSES TO PHQ QUESTIONS 1-9: 7

## 2023-09-06 NOTE — PROGRESS NOTES
Assessment & Plan     Hypersomnolence disorder, persistent  Sleeping when I walked in.  She is busy but still tired in afternoon.  We will increase to 200 mg.   - modafinil (PROVIGIL) 200 MG tablet; Take 1 tablet (200 mg) by mouth daily    Persistent insomnia  She will be continued on Klonopin and that has been working.     Major depressive disorder, recurrent episode, moderate (H)  She is going to be continued on Lexapro and Abilify.  Good combination for her.     Prescription drug management  10  minutes spent by me on the date of the encounter doing chart review, history and exam, documentation and further activities per the note       See Patient Instructions    No follow-ups on file.    FELIX Joe  [facility] - [facility] Tineo is a 62 year old, presenting for the following health issues:  Recheck Medication        9/6/2023     2:09 PM   Additional Questions   Roomed by Nita Garcia   Accompanied by none         9/6/2023     2:09 PM   Patient Reported Additional Medications   Patient reports taking the following new medications none       HPI     Medication Followup of Escitalopram  Taking Medication as prescribed: yes  Side Effects:  None  Medication Helping Symptoms:  yes      Medication Followup of Modafinil  Taking Medication as prescribed: yes  Side Effects:  None  Medication Helping Symptoms:  yes        Review of Systems   Constitutional, HEENT, cardiovascular, pulmonary, gi and gu systems are negative, except as otherwise noted.      Objective    /72 (BP Location: Left arm, Patient Position: Sitting, Cuff Size: Adult Regular)   Pulse 74   Temp 98  F (36.7  C) (Tympanic)   Wt 70.4 kg (155 lb 4.8 oz)   SpO2 94%   BMI 28.87 kg/m    Body mass index is 28.87 kg/m .  Physical Exam   GENERAL: healthy, alert and no distress  EYES: Eyes grossly normal to inspection, PERRL and conjunctivae and sclerae normal  HENT: ear canals and TM's normal, nose and mouth without  ulcers or lesions  NECK: no adenopathy, no asymmetry, masses, or scars and thyroid normal to palpation  RESP: lungs clear to auscultation - no rales, rhonchi or wheezes  CV: regular rate and rhythm, normal S1 S2, no S3 or S4, no murmur, click or rub, no peripheral edema and peripheral pulses strong  ABDOMEN: soft, nontender, no hepatosplenomegaly, no masses and bowel sounds normal  MS: no gross musculoskeletal defects noted, no edema  SKIN: no suspicious lesions or rashes  NEURO: Normal strength and tone, mentation intact and speech normal  PSYCH: mentation appears normal, affect normal/bright    Office Visit on 04/21/2023   Component Date Value Ref Range Status    Sodium 04/21/2023 141  136 - 145 mmol/L Final    Potassium 04/21/2023 3.9  3.4 - 5.3 mmol/L Final    Chloride 04/21/2023 104  98 - 107 mmol/L Final    Carbon Dioxide (CO2) 04/21/2023 27  22 - 29 mmol/L Final    Anion Gap 04/21/2023 10  7 - 15 mmol/L Final    Urea Nitrogen 04/21/2023 9.8  8.0 - 23.0 mg/dL Final    Creatinine 04/21/2023 0.73  0.51 - 0.95 mg/dL Final    Calcium 04/21/2023 9.7  8.8 - 10.2 mg/dL Final    Glucose 04/21/2023 117 (H)  70 - 99 mg/dL Final    Alkaline Phosphatase 04/21/2023 86  35 - 104 U/L Final    AST 04/21/2023 18  10 - 35 U/L Final    ALT 04/21/2023 12  10 - 35 U/L Final    Protein Total 04/21/2023 6.9  6.4 - 8.3 g/dL Final    Albumin 04/21/2023 4.2  3.5 - 5.2 g/dL Final    Bilirubin Total 04/21/2023 0.2  <=1.2 mg/dL Final    GFR Estimate 04/21/2023 >90  >60 mL/min/1.73m2 Final    eGFR calculated using 2021 CKD-EPI equation.

## 2023-09-27 DIAGNOSIS — F41.1 GAD (GENERALIZED ANXIETY DISORDER): ICD-10-CM

## 2023-09-27 NOTE — TELEPHONE ENCOUNTER
Klonopin 0.5mg      Last Written Prescription Date:  08/28/2023  Last Fill Quantity: 60,   # refills: 0  Last Office Visit: 09/06/2023  Future Office visit:       Routing refill request to provider for review/approval because:

## 2023-09-29 RX ORDER — CLONAZEPAM 0.5 MG/1
0.5 TABLET ORAL 2 TIMES DAILY
Qty: 60 TABLET | Refills: 0 | Status: SHIPPED | OUTPATIENT
Start: 2023-09-29 | End: 2023-10-31

## 2023-10-31 ENCOUNTER — MYC REFILL (OUTPATIENT)
Dept: FAMILY MEDICINE | Facility: OTHER | Age: 63
End: 2023-10-31

## 2023-10-31 DIAGNOSIS — F41.1 GAD (GENERALIZED ANXIETY DISORDER): ICD-10-CM

## 2023-10-31 RX ORDER — CLONAZEPAM 0.5 MG/1
0.5 TABLET ORAL 2 TIMES DAILY
Qty: 60 TABLET | Refills: 0 | OUTPATIENT
Start: 2023-10-31

## 2023-10-31 NOTE — TELEPHONE ENCOUNTER
clonazePAM (KLONOPIN) 0.5 MG tablet 60 tablet 0 9/29/2023     Last Office Visit: 09/06/2023     Future Office visit:       Routing refill request to provider for review/approval because:

## 2023-10-31 NOTE — TELEPHONE ENCOUNTER
clonazePAM (KLONOPIN) 0.5 MG tablet 60 tablet 0 9/29/2023  N   Last Office Visit: 09/06/2023     Future Office visit:       Routing refill request to provider for review/approval because:

## 2023-11-01 RX ORDER — CLONAZEPAM 0.5 MG/1
0.5 TABLET ORAL 2 TIMES DAILY
Qty: 60 TABLET | Refills: 0 | Status: SHIPPED | OUTPATIENT
Start: 2023-11-01 | End: 2023-11-29

## 2023-11-05 ENCOUNTER — MYC REFILL (OUTPATIENT)
Dept: FAMILY MEDICINE | Facility: OTHER | Age: 63
End: 2023-11-05

## 2023-11-05 DIAGNOSIS — G47.10 HYPERSOMNOLENCE DISORDER, PERSISTENT: ICD-10-CM

## 2023-11-06 DIAGNOSIS — G47.10 HYPERSOMNOLENCE DISORDER, PERSISTENT: ICD-10-CM

## 2023-11-06 RX ORDER — MODAFINIL 200 MG/1
200 TABLET ORAL DAILY
Qty: 30 TABLET | Refills: 0 | OUTPATIENT
Start: 2023-11-06

## 2023-11-06 RX ORDER — MODAFINIL 200 MG/1
200 TABLET ORAL DAILY
Qty: 30 TABLET | Refills: 0 | Status: SHIPPED | OUTPATIENT
Start: 2023-11-06 | End: 2023-12-05

## 2023-11-06 NOTE — TELEPHONE ENCOUNTER
Provigil      Last Written Prescription Date:  10.4.23  Last Fill Quantity: #30,   # refills: 0  Last Office Visit: 9.6.23  Future Office visit:       Routing refill request to provider for review/approval because:  Drug not on the [facility], P or [facility] refill protocol or controlled substance

## 2023-11-21 ENCOUNTER — MYC MEDICAL ADVICE (OUTPATIENT)
Dept: FAMILY MEDICINE | Facility: OTHER | Age: 63
End: 2023-11-21

## 2023-11-21 DIAGNOSIS — K04.7 ABSCESSED TOOTH: Primary | ICD-10-CM

## 2023-11-22 RX ORDER — AMOXICILLIN 500 MG/1
CAPSULE ORAL
Qty: 4 CAPSULE | Refills: 0 | Status: SHIPPED | OUTPATIENT
Start: 2023-11-22 | End: 2024-03-17

## 2023-11-29 ENCOUNTER — MYC REFILL (OUTPATIENT)
Dept: FAMILY MEDICINE | Facility: OTHER | Age: 63
End: 2023-11-29

## 2023-11-29 DIAGNOSIS — F41.1 GAD (GENERALIZED ANXIETY DISORDER): ICD-10-CM

## 2023-11-30 NOTE — TELEPHONE ENCOUNTER
Clonazepam       Last Written Prescription Date:  11/01/2023  Last Fill Quantity: 60,   # refills: 0  Last Office Visit: 09/06/2023  Future Office visit:       Routing refill request to provider for review/approval because:

## 2023-12-01 RX ORDER — CLONAZEPAM 0.5 MG/1
0.5 TABLET ORAL 2 TIMES DAILY
Qty: 60 TABLET | Refills: 0 | Status: SHIPPED | OUTPATIENT
Start: 2023-12-01 | End: 2024-01-01

## 2023-12-05 ENCOUNTER — MYC REFILL (OUTPATIENT)
Dept: FAMILY MEDICINE | Facility: OTHER | Age: 63
End: 2023-12-05

## 2023-12-05 DIAGNOSIS — G47.10 HYPERSOMNOLENCE DISORDER, PERSISTENT: ICD-10-CM

## 2023-12-05 RX ORDER — MODAFINIL 200 MG/1
200 TABLET ORAL DAILY
Qty: 30 TABLET | Refills: 0 | Status: SHIPPED | OUTPATIENT
Start: 2023-12-05 | End: 2024-01-01

## 2023-12-05 NOTE — TELEPHONE ENCOUNTER
Provigil      Last Written Prescription Date:  11.6.23  Last Fill Quantity: #30,   # refills: 0  Last Office Visit: 9.6.23  Future Office visit:       Routing refill request to provider for review/approval because:  Drug not on the [facility], P or [facility] refill protocol or controlled substance

## 2024-01-01 ENCOUNTER — MYC REFILL (OUTPATIENT)
Dept: FAMILY MEDICINE | Facility: OTHER | Age: 64
End: 2024-01-01

## 2024-01-01 DIAGNOSIS — F41.1 GAD (GENERALIZED ANXIETY DISORDER): ICD-10-CM

## 2024-01-01 DIAGNOSIS — G47.10 HYPERSOMNOLENCE DISORDER, PERSISTENT: ICD-10-CM

## 2024-01-02 NOTE — TELEPHONE ENCOUNTER
clonazepam      Last Written Prescription Date:  12/01/2023  Last Fill Quantity: 60,   # refills: 0  Last Office Visit: 09/06/2023  Future Office visit:       Routing refill request to provider for review/approval because:            Modafinil       Last Written Prescription Date:  12/05/2023  Last Fill Quantity: 30,   # refills: 0  Last Office Visit: 09/06/2023  Future Office visit:       Routing refill request to provider for review/approval because:

## 2024-01-03 RX ORDER — CLONAZEPAM 0.5 MG/1
0.5 TABLET ORAL 2 TIMES DAILY
Qty: 60 TABLET | Refills: 0 | Status: SHIPPED | OUTPATIENT
Start: 2024-01-03 | End: 2024-01-30

## 2024-01-03 RX ORDER — MODAFINIL 200 MG/1
200 TABLET ORAL DAILY
Qty: 30 TABLET | Refills: 0 | Status: SHIPPED | OUTPATIENT
Start: 2024-01-03 | End: 2024-01-30

## 2024-01-30 ENCOUNTER — MYC REFILL (OUTPATIENT)
Dept: FAMILY MEDICINE | Facility: OTHER | Age: 64
End: 2024-01-30

## 2024-01-30 DIAGNOSIS — G47.10 HYPERSOMNOLENCE DISORDER, PERSISTENT: ICD-10-CM

## 2024-01-30 DIAGNOSIS — F41.1 GAD (GENERALIZED ANXIETY DISORDER): ICD-10-CM

## 2024-01-30 DIAGNOSIS — L50.1 CHRONIC IDIOPATHIC URTICARIA: ICD-10-CM

## 2024-01-30 RX ORDER — MODAFINIL 200 MG/1
200 TABLET ORAL DAILY
Qty: 30 TABLET | Refills: 3 | Status: SHIPPED | OUTPATIENT
Start: 2024-01-30 | End: 2024-06-07

## 2024-01-30 RX ORDER — CLONAZEPAM 0.5 MG/1
0.5 TABLET ORAL 2 TIMES DAILY
Qty: 60 TABLET | Refills: 3 | Status: SHIPPED | OUTPATIENT
Start: 2024-01-30 | End: 2024-06-07

## 2024-01-30 RX ORDER — ESCITALOPRAM OXALATE 20 MG/1
20 TABLET ORAL DAILY
Qty: 30 TABLET | Refills: 4 | Status: SHIPPED | OUTPATIENT
Start: 2024-01-30 | End: 2024-07-08

## 2024-01-30 NOTE — TELEPHONE ENCOUNTER
clonazePAM (KLONOPIN) 0.5 MG tablet       Last Written Prescription Date:  1/3/2024  Last Fill Quantity: 60,   # refills: 0  Last Office Visit: 9/6/2023  Future Office visit:       Routing refill request to provider for review/approval because:      modafinil (PROVIGIL) 200 MG tablet       Last Written Prescription Date:  1/3/2024  Last Fill Quantity: 30,   # refills: 0  Last Office Visit: 9/6/2023  Future Office visit:       Routing refill request to provider for review/approval because:    Kimberly Boecker, RN

## 2024-02-14 ENCOUNTER — MYC MEDICAL ADVICE (OUTPATIENT)
Dept: FAMILY MEDICINE | Facility: OTHER | Age: 64
End: 2024-02-14

## 2024-02-26 ENCOUNTER — TRANSFERRED RECORDS (OUTPATIENT)
Dept: HEALTH INFORMATION MANAGEMENT | Facility: CLINIC | Age: 64
End: 2024-02-26

## 2024-03-14 NOTE — PROGRESS NOTES
Preoperative Evaluation  [facility] [place]  [address]  [place] [address]  Phone: [phone]  Primary Provider: Mia Villagomez  Pre-op Performing Provider: MIA VILLAGOMEZ  Mar 15, 2024       Noman is a 63 year old, presenting for the following:  Pre-Op Exam        3/15/2024     3:41 PM   Additional Questions   Roomed by Nita Garcia   Accompanied by none         3/15/2024     3:41 PM   Patient Reported Additional Medications   Patient reports taking the following new medications none     Surgical Information  Surgery/Procedure: Left Knee   Surgery Location: [facility]   Surgeon: Dr. Lima  Surgery Date: 3/27/24  Time of Surgery: TBD  Where patient plans to recover: At home with family  Fax number for surgical facility: [facility] @ [phone] and [facility] @ [phone].    Assessment & Plan     The proposed surgical procedure is considered INTERMEDIATE risk.    Pre-op exam  She is going to have a left knee replacement on April 27, 2024 [facility] by Dr. Lima. She has had long standing anemia.  11.0 seems to be her baseline.  Is optimized for surgery.  Has help in the home. No need for ABX for other joints as they are far enough out in their surgical hx.    - EKG 12-lead complete w/read - (Clinic Performed)  - Comprehensive metabolic panel (BMP + Alb, Alk Phos, ALT, AST, Total. Bili, TP); Future  - CBC with platelets and differential; Future            - No identified additional risk factors other than previously addressed    Antiplatelet or Anticoagulation Medication Instructions   - Patient is on no antiplatelet or anticoagulation medications.    Additional Medication Instructions   - ibuprofen (Advil, Motrin): HOLD 1 day before surgery.    - Benzodiazepines: Continue without modification.   - SSRIs, SNRIs, TCAs, Antipsychotics: Continue without modification.     Recommendation  APPROVAL GIVEN to proceed with proposed procedure, without further diagnostic  evaluation.    Ordering of each unique test  Prescription drug management  10  minutes spent by me on the date of the encounter doing chart review, history and exam, documentation and further activities per the note    Subjective       HPI related to upcoming procedure: we will be having a Left knee replacement done on April 27, 2024 by Dr. Lima. Has advanced osteoarthritis of her knee and has chronic pain.  Has tried injections and PT without success. Has had hip and knee replaced    Right knee was in  2021 and left hip done 7 years ago.  All doing well and tolerated anesthesia fine in the past.         3/8/2024     9:17 AM   Preop Questions   1. Have you ever had a heart attack or stroke? No   2. Have you ever had surgery on your heart or blood vessels, such as a stent placement, a coronary artery bypass, or surgery on an artery in your head, neck, heart, or legs? No   3. Do you have chest pain with activity? No   4. Do you have a history of  heart failure? No   5. Do you currently have a cold, bronchitis or symptoms of other infection? No   6. Do you have a cough, shortness of breath, or wheezing? No   7. Do you or anyone in your family have previous history of blood clots? No   8. Do you or does anyone in your family have a serious bleeding problem such as prolonged bleeding following surgeries or cuts? No   9. Have you ever had problems with anemia or been told to take iron pills? YES - was on iron pills   10. Have you had any abnormal blood loss such as black, tarry or bloody stools, or abnormal vaginal bleeding? No   11. Have you ever had a blood transfusion? No   12. Are you willing to have a blood transfusion if it is medically needed before, during, or after your surgery? Yes   13. Have you or any of your relatives ever had problems with anesthesia? No   14. Do you have sleep apnea, excessive snoring or daytime drowsiness? No   15. Do you have any artifical heart valves or other implanted medical devices  like a pacemaker, defibrillator, or continuous glucose monitor? No   16. Do you have artificial joints? YES - right knee and left hip   17. Are you allergic to latex? No       Health Care Directive  Patient does not have a Health Care Directive or Living Will: Discussed advance care planning with patient; however, patient declined at this time.    Preoperative Review of    reviewed - controlled substances prescribed by other outside provider(s).      Status of Chronic Conditions:  See problem list for active medical problems.  Problems all longstanding and stable, except as noted/documented.  See ROS for pertinent symptoms related to these conditions.    DEPRESSION - Patient has a long history of Depression of moderate severity requiring medication for control with recent symptoms being stable..Current symptoms of depression include depressed mood, hopelessness, insomnia, fatigue.     Patient Active Problem List    Diagnosis Date Noted    Arthritis of left knee 11/11/2022     Priority: Medium    Fall, subsequent encounter 10/19/2020     Priority: Medium    Closed head injury, subsequent encounter 10/19/2020     Priority: Medium    Significant closed head trauma within past 3 months 09/21/2020     Priority: Medium    Acute recurrent maxillary sinusitis 09/21/2020     Priority: Medium    Special screening for malignant neoplasms, colon 02/26/2020     Priority: Medium     Added automatically from request for surgery [id number]      RADHA (generalized anxiety disorder) 08/18/2019     Priority: Medium    Chronic idiopathic urticaria 08/18/2019     Priority: Medium    Advanced directives, counseling/discussion 02/23/2017     Priority: Medium     Advance Care Planning 2/23/2017: ACP Review of Chart / Resources Provided:  Reviewed chart for advance care plan.  Estella ORO Aas has no plan or code status on file. Discussed available resources and provided with information. Confirmed code status reflects current choices pending  "further ACP discussions.  Confirmed/documented legally designated decision makers.  Added by Gail Zambrano              Past Medical History:   Diagnosis Date    Arthritis 2020    Mostly hands and knees    Depressive disorder     Depressive disorder, not elsewhere classified 07/23/2008    Dysthymic disorder 08/20/2008     Past Surgical History:   Procedure Laterality Date    ABDOMEN SURGERY  1995?    AS TOTAL HIP ARTHROPLASTY Left 03/02/2017    basilar joint injection, left thumb  03/02/2017    COLONOSCOPY  05/20/2006    COLONOSCOPY N/A 06/18/2020    Procedure: COLONOSCOPY;  Surgeon: Bradly Jauregui MD;  Location: [facility] OR    hand  20009    RT    HYSTERECTOMY TOTAL ABDOMINAL N/A 2000    Cervix removed. Ovaries remain.     ZZC TOTAL HIP ARTHROPLASTY Left 03/02/2017     Current Outpatient Medications   Medication Sig Dispense Refill    ARIPiprazole (ABILIFY) 2 MG tablet Take 1 tablet (2 mg) by mouth daily 30 tablet 11    clonazePAM (KLONOPIN) 0.5 MG tablet Take 1 tablet (0.5 mg) by mouth 2 times daily 60 tablet 3    escitalopram (LEXAPRO) 20 MG tablet Take 1 tablet (20 mg) by mouth daily 30 tablet 4    modafinil (PROVIGIL) 200 MG tablet Take 1 tablet (200 mg) by mouth daily 30 tablet 3    amoxicillin (AMOXIL) 500 MG capsule Take two capsules 12 hours prior to your dental procedure and 2 capsules the morning of the procedure. 4 capsule 0       Allergies   Allergen Reactions    Cats      \"Cat/feline product derivatives\"        Social History     Tobacco Use    Smoking status: Some Days     Packs/day: 0.00     Years: 7.00     Additional pack years: 0.00     Total pack years: 0.00     Types: Cigarettes    Smokeless tobacco: Current    Tobacco comments:     Quit 1990; no passive exposure   Substance Use Topics    Alcohol use: Yes     Comment: Socially     Family History   Problem Relation Age of Onset    Hypertension Mother     Other - See Comments Father         Alzheimer's Disease    C.A.D. Father     Cancer Brother  " "   Hypertension Son     Mental Illness Son     Prostate Cancer Brother             Other Cancer Brother             Depression Sister      History   Drug Use No         Review of Systems    Review of Systems  CONSTITUTIONAL: NEGATIVE for fever, chills, change in weight  INTEGUMENTARY/SKIN: NEGATIVE for worrisome rashes, moles or lesions  EYES: NEGATIVE for vision changes or irritation  ENT/MOUTH: NEGATIVE for ear, mouth and throat problems  RESP: NEGATIVE for significant cough or SOB  BREAST: NEGATIVE for masses, tenderness or discharge  CV: NEGATIVE for chest pain, palpitations or peripheral edema  GI: NEGATIVE for nausea, abdominal pain, heartburn, or change in bowel habits  : NEGATIVE for frequency, dysuria, or hematuria  MUSCULOSKELETAL:see hpi   NEURO: NEGATIVE for weakness, dizziness or paresthesias  ENDOCRINE: NEGATIVE for temperature intolerance, skin/hair changes  HEME: NEGATIVE for bleeding problems  PSYCHIATRIC: NEGATIVE for changes in mood or affect    Objective    /79 (BP Location: Left arm, Patient Position: Sitting, Cuff Size: Adult Large)   Pulse 77   Temp 97.6  F (36.4  C) (Tympanic)   Resp 16   Ht 1.562 m (5' 1.5\")   Wt 74.2 kg (163 lb 9.6 oz)   SpO2 97%   BMI 30.41 kg/m     Estimated body mass index is 30.41 kg/m  as calculated from the following:    Height as of this encounter: 1.562 m (5' 1.5\").    Weight as of this encounter: 74.2 kg (163 lb 9.6 oz).  Physical Exam  GENERAL: alert and no distress  EYES: Eyes grossly normal to inspection, PERRL and conjunctivae and sclerae normal  HENT: ear canals and TM's normal, nose and mouth without ulcers or lesions  NECK: no adenopathy, no asymmetry, masses, or scars  RESP: lungs clear to auscultation - no rales, rhonchi or wheezes  CV: regular rate and rhythm, normal S1 S2, no S3 or S4, no murmur, click or rub, no peripheral edema  ABDOMEN: soft, nontender, no hepatosplenomegaly, no masses and bowel sounds normal  MS: no " gross musculoskeletal defects noted, no edema  SKIN: no suspicious lesions or rashes  NEURO: Normal strength and tone, mentation intact and speech normal  PSYCH: mentation appears normal, affect normal/bright    Recent Labs   Lab Test 04/21/23  0958 08/25/22  0932   HGB  --  11.3*   PLT  --  344    139   POTASSIUM 3.9 3.6   CR 0.73 0.70        Diagnostics  Recent Results (from the past 24 hour(s))   TSH with free T4 reflex    Collection Time: 03/15/24  3:39 PM   Result Value Ref Range    TSH 2.23 0.30 - 4.20 uIU/mL   CRP, inflammation    Collection Time: 03/15/24  3:39 PM   Result Value Ref Range    CRP Inflammation <3.00 <5.00 mg/L   Lipid Profile (Chol, Trig, HDL, LDL calc)    Collection Time: 03/15/24  3:39 PM   Result Value Ref Range    Cholesterol 234 (H) <200 mg/dL    Triglycerides 223 (H) <150 mg/dL    Direct Measure HDL 65 >=50 mg/dL    LDL Cholesterol Calculated 124 (H) <=100 mg/dL    Non HDL Cholesterol 169 (H) <130 mg/dL    Patient Fasting > 8hrs? No    Comprehensive metabolic panel (BMP + Alb, Alk Phos, ALT, AST, Total. Bili, TP)    Collection Time: 03/15/24  3:39 PM   Result Value Ref Range    Sodium 139 135 - 145 mmol/L    Potassium 3.7 3.4 - 5.3 mmol/L    Carbon Dioxide (CO2) 27 22 - 29 mmol/L    Anion Gap 11 7 - 15 mmol/L    Urea Nitrogen 7.3 (L) 8.0 - 23.0 mg/dL    Creatinine 0.76 0.51 - 0.95 mg/dL    GFR Estimate 87 >60 mL/min/1.73m2    Calcium 9.4 8.8 - 10.2 mg/dL    Chloride 101 98 - 107 mmol/L    Glucose 85 70 - 99 mg/dL    Alkaline Phosphatase 83 40 - 150 U/L    AST 17 0 - 45 U/L    ALT 9 0 - 50 U/L    Protein Total 7.5 6.4 - 8.3 g/dL    Albumin 4.6 3.5 - 5.2 g/dL    Bilirubin Total <0.2 <=1.2 mg/dL   CBC with platelets and differential    Collection Time: 03/15/24  3:39 PM   Result Value Ref Range    WBC Count 7.3 4.0 - 11.0 10e3/uL    RBC Count 4.77 3.80 - 5.20 10e6/uL    Hemoglobin 11.0 (L) 11.7 - 15.7 g/dL    Hematocrit 35.4 35.0 - 47.0 %    MCV 74 (L) 78 - 100 fL    MCH 23.1 (L) 26.5  - 33.0 pg    MCHC 31.1 (L) 31.5 - 36.5 g/dL    RDW 14.9 10.0 - 15.0 %    Platelet Count 352 150 - 450 10e3/uL    % Neutrophils 50 %    % Lymphocytes 42 %    % Monocytes 6 %    % Eosinophils 3 %    % Basophils 1 %    % Immature Granulocytes 0 %    NRBCs per 100 WBC 0 <1 /100    Absolute Neutrophils 3.6 1.6 - 8.3 10e3/uL    Absolute Lymphocytes 3.0 0.8 - 5.3 10e3/uL    Absolute Monocytes 0.4 0.0 - 1.3 10e3/uL    Absolute Eosinophils 0.2 0.0 - 0.7 10e3/uL    Absolute Basophils 0.1 0.0 - 0.2 10e3/uL    Absolute Immature Granulocytes 0.0 <=0.4 10e3/uL    Absolute NRBCs 0.0 10e3/uL   EKG 12-lead complete w/read - (Clinic Performed)    Collection Time: 03/15/24  3:55 PM   Result Value Ref Range    Systolic Blood Pressure  mmHg    Diastolic Blood Pressure  mmHg    Ventricular Rate 71 BPM    Atrial Rate 71 BPM    FL Interval 150 ms    QRS Duration 82 ms     ms    QTc 454 ms    P Axis 18 degrees    R AXIS -1 degrees    T Axis 10 degrees    Interpretation ECG       Sinus rhythm  Possible Anterior infarct , age undetermined  Abnormal ECG  No previous ECGs available            Revised Cardiac Risk Index (RCRI)  The patient has the following serious cardiovascular risks for perioperative complications:   - No serious cardiac risks = 0 points     RCRI Interpretation: 0 points: Class I (very low risk - 0.4% complication rate)         Signed Electronically by: FELIX Joe  Copy of this evaluation report is provided to requesting physician.         Answers submitted by the patient for this visit:  Patient Health Questionnaire (Submitted on 3/15/2024)  If you checked off any problems, how difficult have these problems made it for you to do your work, take care of things at home, or get along with other people?: Not difficult at all  PHQ9 TOTAL SCORE: 5  RADHA-7 (Submitted on 3/15/2024)  RADHA 7 TOTAL SCORE: 4

## 2024-03-15 ENCOUNTER — LAB (OUTPATIENT)
Dept: LAB | Facility: OTHER | Age: 64
End: 2024-03-15
Attending: PHYSICIAN ASSISTANT
Payer: COMMERCIAL

## 2024-03-15 ENCOUNTER — OFFICE VISIT (OUTPATIENT)
Dept: FAMILY MEDICINE | Facility: OTHER | Age: 64
End: 2024-03-15
Attending: PHYSICIAN ASSISTANT
Payer: COMMERCIAL

## 2024-03-15 VITALS
HEART RATE: 77 BPM | OXYGEN SATURATION: 97 % | HEIGHT: 62 IN | TEMPERATURE: 97.6 F | DIASTOLIC BLOOD PRESSURE: 79 MMHG | RESPIRATION RATE: 16 BRPM | SYSTOLIC BLOOD PRESSURE: 119 MMHG | WEIGHT: 163.6 LBS | BODY MASS INDEX: 30.11 KG/M2

## 2024-03-15 DIAGNOSIS — Z01.818 PRE-OP EXAM: Primary | ICD-10-CM

## 2024-03-15 DIAGNOSIS — Z01.818 PRE-OP EXAM: ICD-10-CM

## 2024-03-15 LAB
ALBUMIN SERPL BCG-MCNC: 4.6 G/DL (ref 3.5–5.2)
ALP SERPL-CCNC: 83 U/L (ref 40–150)
ALT SERPL W P-5'-P-CCNC: 9 U/L (ref 0–50)
ANION GAP SERPL CALCULATED.3IONS-SCNC: 11 MMOL/L (ref 7–15)
AST SERPL W P-5'-P-CCNC: 17 U/L (ref 0–45)
BASOPHILS # BLD AUTO: 0.1 10E3/UL (ref 0–0.2)
BASOPHILS NFR BLD AUTO: 1 %
BILIRUB SERPL-MCNC: <0.2 MG/DL
BUN SERPL-MCNC: 7.3 MG/DL (ref 8–23)
CALCIUM SERPL-MCNC: 9.4 MG/DL (ref 8.8–10.2)
CHLORIDE SERPL-SCNC: 101 MMOL/L (ref 98–107)
CHOLEST SERPL-MCNC: 234 MG/DL
CREAT SERPL-MCNC: 0.76 MG/DL (ref 0.51–0.95)
CRP SERPL-MCNC: <3 MG/L
DEPRECATED HCO3 PLAS-SCNC: 27 MMOL/L (ref 22–29)
EGFRCR SERPLBLD CKD-EPI 2021: 87 ML/MIN/1.73M2
EOSINOPHIL # BLD AUTO: 0.2 10E3/UL (ref 0–0.7)
EOSINOPHIL NFR BLD AUTO: 3 %
ERYTHROCYTE [DISTWIDTH] IN BLOOD BY AUTOMATED COUNT: 14.9 % (ref 10–15)
FASTING STATUS PATIENT QL REPORTED: NO
GLUCOSE SERPL-MCNC: 85 MG/DL (ref 70–99)
HCT VFR BLD AUTO: 35.4 % (ref 35–47)
HDLC SERPL-MCNC: 65 MG/DL
HGB BLD-MCNC: 11 G/DL (ref 11.7–15.7)
IMM GRANULOCYTES # BLD: 0 10E3/UL
IMM GRANULOCYTES NFR BLD: 0 %
LDLC SERPL CALC-MCNC: 124 MG/DL
LYMPHOCYTES # BLD AUTO: 3 10E3/UL (ref 0.8–5.3)
LYMPHOCYTES NFR BLD AUTO: 42 %
MCH RBC QN AUTO: 23.1 PG (ref 26.5–33)
MCHC RBC AUTO-ENTMCNC: 31.1 G/DL (ref 31.5–36.5)
MCV RBC AUTO: 74 FL (ref 78–100)
MONOCYTES # BLD AUTO: 0.4 10E3/UL (ref 0–1.3)
MONOCYTES NFR BLD AUTO: 6 %
NEUTROPHILS # BLD AUTO: 3.6 10E3/UL (ref 1.6–8.3)
NEUTROPHILS NFR BLD AUTO: 50 %
NONHDLC SERPL-MCNC: 169 MG/DL
NRBC # BLD AUTO: 0 10E3/UL
NRBC BLD AUTO-RTO: 0 /100
PLATELET # BLD AUTO: 352 10E3/UL (ref 150–450)
POTASSIUM SERPL-SCNC: 3.7 MMOL/L (ref 3.4–5.3)
PROT SERPL-MCNC: 7.5 G/DL (ref 6.4–8.3)
RBC # BLD AUTO: 4.77 10E6/UL (ref 3.8–5.2)
SODIUM SERPL-SCNC: 139 MMOL/L (ref 135–145)
TRIGL SERPL-MCNC: 223 MG/DL
TSH SERPL DL<=0.005 MIU/L-ACNC: 2.23 UIU/ML (ref 0.3–4.2)
WBC # BLD AUTO: 7.3 10E3/UL (ref 4–11)

## 2024-03-15 PROCEDURE — 93000 ELECTROCARDIOGRAM COMPLETE: CPT | Mod: 77 | Performed by: HOSPITALIST

## 2024-03-15 PROCEDURE — 80061 LIPID PANEL: CPT | Performed by: PHYSICIAN ASSISTANT

## 2024-03-15 PROCEDURE — 99214 OFFICE O/P EST MOD 30 MIN: CPT | Performed by: PHYSICIAN ASSISTANT

## 2024-03-15 PROCEDURE — 80050 GENERAL HEALTH PANEL: CPT | Performed by: PHYSICIAN ASSISTANT

## 2024-03-15 PROCEDURE — 86140 C-REACTIVE PROTEIN: CPT | Performed by: PHYSICIAN ASSISTANT

## 2024-03-15 PROCEDURE — 36415 COLL VENOUS BLD VENIPUNCTURE: CPT | Performed by: PHYSICIAN ASSISTANT

## 2024-03-15 ASSESSMENT — ANXIETY QUESTIONNAIRES
8. IF YOU CHECKED OFF ANY PROBLEMS, HOW DIFFICULT HAVE THESE MADE IT FOR YOU TO DO YOUR WORK, TAKE CARE OF THINGS AT HOME, OR GET ALONG WITH OTHER PEOPLE?: NOT DIFFICULT AT ALL
6. BECOMING EASILY ANNOYED OR IRRITABLE: SEVERAL DAYS
GAD7 TOTAL SCORE: 4
7. FEELING AFRAID AS IF SOMETHING AWFUL MIGHT HAPPEN: NOT AT ALL
2. NOT BEING ABLE TO STOP OR CONTROL WORRYING: SEVERAL DAYS
GAD7 TOTAL SCORE: 4
7. FEELING AFRAID AS IF SOMETHING AWFUL MIGHT HAPPEN: NOT AT ALL
4. TROUBLE RELAXING: NOT AT ALL
1. FEELING NERVOUS, ANXIOUS, OR ON EDGE: SEVERAL DAYS
5. BEING SO RESTLESS THAT IT IS HARD TO SIT STILL: NOT AT ALL
3. WORRYING TOO MUCH ABOUT DIFFERENT THINGS: SEVERAL DAYS
IF YOU CHECKED OFF ANY PROBLEMS ON THIS QUESTIONNAIRE, HOW DIFFICULT HAVE THESE PROBLEMS MADE IT FOR YOU TO DO YOUR WORK, TAKE CARE OF THINGS AT HOME, OR GET ALONG WITH OTHER PEOPLE: NOT DIFFICULT AT ALL
GAD7 TOTAL SCORE: 4

## 2024-03-15 ASSESSMENT — PATIENT HEALTH QUESTIONNAIRE - PHQ9
SUM OF ALL RESPONSES TO PHQ QUESTIONS 1-9: 5
SUM OF ALL RESPONSES TO PHQ QUESTIONS 1-9: 5
10. IF YOU CHECKED OFF ANY PROBLEMS, HOW DIFFICULT HAVE THESE PROBLEMS MADE IT FOR YOU TO DO YOUR WORK, TAKE CARE OF THINGS AT HOME, OR GET ALONG WITH OTHER PEOPLE: NOT DIFFICULT AT ALL

## 2024-03-18 LAB
ATRIAL RATE - MUSE: 71 BPM
DIASTOLIC BLOOD PRESSURE - MUSE: NORMAL MMHG
INTERPRETATION ECG - MUSE: NORMAL
P AXIS - MUSE: 18 DEGREES
PR INTERVAL - MUSE: 150 MS
QRS DURATION - MUSE: 82 MS
QT - MUSE: 418 MS
QTC - MUSE: 454 MS
R AXIS - MUSE: -1 DEGREES
SYSTOLIC BLOOD PRESSURE - MUSE: NORMAL MMHG
T AXIS - MUSE: 10 DEGREES
VENTRICULAR RATE- MUSE: 71 BPM

## 2024-03-19 ENCOUNTER — TELEPHONE (OUTPATIENT)
Dept: FAMILY MEDICINE | Facility: OTHER | Age: 64
End: 2024-03-19

## 2024-04-28 ENCOUNTER — MYC MEDICAL ADVICE (OUTPATIENT)
Dept: FAMILY MEDICINE | Facility: OTHER | Age: 64
End: 2024-04-28

## 2024-04-28 ENCOUNTER — HEALTH MAINTENANCE LETTER (OUTPATIENT)
Age: 64
End: 2024-04-28

## 2024-04-28 ASSESSMENT — ANXIETY QUESTIONNAIRES
GAD7 TOTAL SCORE: 2
7. FEELING AFRAID AS IF SOMETHING AWFUL MIGHT HAPPEN: NOT AT ALL
2. NOT BEING ABLE TO STOP OR CONTROL WORRYING: NOT AT ALL
GAD7 TOTAL SCORE: 2
8. IF YOU CHECKED OFF ANY PROBLEMS, HOW DIFFICULT HAVE THESE MADE IT FOR YOU TO DO YOUR WORK, TAKE CARE OF THINGS AT HOME, OR GET ALONG WITH OTHER PEOPLE?: SOMEWHAT DIFFICULT
3. WORRYING TOO MUCH ABOUT DIFFERENT THINGS: NOT AT ALL
7. FEELING AFRAID AS IF SOMETHING AWFUL MIGHT HAPPEN: NOT AT ALL
5. BEING SO RESTLESS THAT IT IS HARD TO SIT STILL: NOT AT ALL
6. BECOMING EASILY ANNOYED OR IRRITABLE: NOT AT ALL
GAD7 TOTAL SCORE: 2
4. TROUBLE RELAXING: SEVERAL DAYS
1. FEELING NERVOUS, ANXIOUS, OR ON EDGE: SEVERAL DAYS
IF YOU CHECKED OFF ANY PROBLEMS ON THIS QUESTIONNAIRE, HOW DIFFICULT HAVE THESE PROBLEMS MADE IT FOR YOU TO DO YOUR WORK, TAKE CARE OF THINGS AT HOME, OR GET ALONG WITH OTHER PEOPLE: SOMEWHAT DIFFICULT

## 2024-04-28 ASSESSMENT — PATIENT HEALTH QUESTIONNAIRE - PHQ9
10. IF YOU CHECKED OFF ANY PROBLEMS, HOW DIFFICULT HAVE THESE PROBLEMS MADE IT FOR YOU TO DO YOUR WORK, TAKE CARE OF THINGS AT HOME, OR GET ALONG WITH OTHER PEOPLE: SOMEWHAT DIFFICULT
SUM OF ALL RESPONSES TO PHQ QUESTIONS 1-9: 4
SUM OF ALL RESPONSES TO PHQ QUESTIONS 1-9: 4

## 2024-04-29 NOTE — TELEPHONE ENCOUNTER
Completed [facility] PHQ-9/RADHA-7 on 4/28/2024 for Depression Remission quality patient outreach per [facility] quality guidelines. This writer sees no major mental health and/or safety concerns at present. Currently meeting depression remission status with PHQ-9 total score <=4. Continue with current plan of care.          9/6/2023     2:13 PM 3/15/2024     3:41 PM 4/28/2024     4:43 PM   PHQ   PHQ-9 Total Score 7 5 4   Q9: Thoughts of better off dead/self-harm past 2 weeks Not at all Not at all Not at all          11/11/2022     8:00 AM 3/15/2024     3:43 PM 4/28/2024     4:45 PM   RADHA-7 SCORE   Total Score  4 (minimal anxiety) 2 (minimal anxiety)   Total Score 9 4 2        Kristal Lares RN

## 2024-06-07 DIAGNOSIS — G47.10 HYPERSOMNOLENCE DISORDER, PERSISTENT: ICD-10-CM

## 2024-06-07 DIAGNOSIS — F41.1 GAD (GENERALIZED ANXIETY DISORDER): ICD-10-CM

## 2024-06-07 RX ORDER — MODAFINIL 200 MG/1
200 TABLET ORAL DAILY
Qty: 30 TABLET | Refills: 5 | Status: SHIPPED | OUTPATIENT
Start: 2024-06-07

## 2024-06-07 RX ORDER — CLONAZEPAM 0.5 MG/1
0.5 TABLET ORAL 2 TIMES DAILY
Qty: 60 TABLET | Refills: 3 | Status: SHIPPED | OUTPATIENT
Start: 2024-06-07

## 2024-06-07 NOTE — TELEPHONE ENCOUNTER
CLONAZEPAM 0.5 MG TABLET       Last Written Prescription Date:  01/30/2024  Last Fill Quantity: 60,   # refills: 3  Last Office Visit: 03/15/2024  Future Office visit:       Routing refill request to provider for review/approval because:      MODAFINIL 200 MG TABLET       Last Written Prescription Date:  01/30/2024  Last Fill Quantity: 30,   # refills: 3  Last Office Visit: 03/15/2024  Future Office visit:       Routing refill request to provider for review/approval because:      Kimberly Boecker, RN

## 2024-06-17 PROBLEM — Z71.89 ADVANCED DIRECTIVES, COUNSELING/DISCUSSION: Status: RESOLVED | Noted: 2017-02-23 | Resolved: 2024-06-17

## 2024-07-08 DIAGNOSIS — F41.1 GAD (GENERALIZED ANXIETY DISORDER): ICD-10-CM

## 2024-07-08 DIAGNOSIS — L50.1 CHRONIC IDIOPATHIC URTICARIA: ICD-10-CM

## 2024-07-08 RX ORDER — ESCITALOPRAM OXALATE 20 MG/1
20 TABLET ORAL DAILY
Qty: 30 TABLET | Refills: 0 | Status: SHIPPED | OUTPATIENT
Start: 2024-07-08 | End: 2024-08-07

## 2024-07-22 ENCOUNTER — OFFICE VISIT (OUTPATIENT)
Dept: FAMILY MEDICINE | Facility: OTHER | Age: 64
End: 2024-07-22
Attending: PHYSICIAN ASSISTANT
Payer: COMMERCIAL

## 2024-07-22 VITALS
WEIGHT: 164.9 LBS | TEMPERATURE: 97.6 F | HEIGHT: 62 IN | SYSTOLIC BLOOD PRESSURE: 117 MMHG | RESPIRATION RATE: 16 BRPM | OXYGEN SATURATION: 97 % | HEART RATE: 65 BPM | DIASTOLIC BLOOD PRESSURE: 80 MMHG | BODY MASS INDEX: 30.35 KG/M2

## 2024-07-22 DIAGNOSIS — D22.9 CHANGE IN COLOR OF SKIN MOLE: Primary | ICD-10-CM

## 2024-07-22 DIAGNOSIS — E78.5 HYPERLIPIDEMIA LDL GOAL <100: ICD-10-CM

## 2024-07-22 PROCEDURE — 99213 OFFICE O/P EST LOW 20 MIN: CPT | Performed by: PHYSICIAN ASSISTANT

## 2024-07-22 NOTE — PROGRESS NOTES
"  Assessment & Plan     Change in color of skin mole  Referral to [facility].  She will be seeing us back as needed. Most of these look benign. She is going to see someone with a derma scope for evaluation instead of  bx today.   - Lipid Profile (Chol, Trig, HDL, LDL calc); Future    Hyperlipidemia LDL goal <100  She is going to be getting labs done in 4 months.  She thinks the labs were abnormal.   - Adult Dermatology  Referral; Future          Nicotine/Tobacco Cessation  She reports that she has been smoking cigarettes. She uses smokeless tobacco.  Nicotine/Tobacco Cessation Plan  Information offered: Patient not interested at this time      BMI  Estimated body mass index is 30.65 kg/m  as calculated from the following:    Height as of this encounter: 1.562 m (5' 1.5\").    Weight as of this encounter: 74.8 kg (164 lb 14.4 oz).   Weight management plan: Discussed healthy diet and exercise guidelines      See Patient Instructions    No follow-ups on file.    Nisha Tineo is a 63 year old, presenting for the following health issues:  Derm Problem        7/22/2024     9:04 AM   Additional Questions   Roomed by oliver hoang   Accompanied by none         7/22/2024     9:04 AM   Patient Reported Additional Medications   Patient reports taking the following new medications none     HPI     Rash  Onset/Duration: 1 month or more  Description  Location: face, arms and, legs  Character: flakey  Itching: mild  Intensity:  mild  Progression of Symptoms:  worsening  Accompanying signs and symptoms:   Fever: No  Body aches or joint pain: No  Sore throat symptoms: No  Recent cold symptoms: No  History:           Previous episodes of similar rash: None  New exposures:  None  Recent travel: No  Exposure to similar rash: No  Precipitating or alleviating factors: none  Therapies tried and outcome: has tried some medicated cream and states that when she does that it burns        Review of Systems  Constitutional, " "HEENT, cardiovascular, pulmonary, gi and gu systems are negative, except as otherwise noted.      Objective    /80 (BP Location: Right arm, Patient Position: Sitting, Cuff Size: Adult Regular)   Pulse 65   Temp 97.6  F (36.4  C) (Tympanic)   Resp 16   Ht 1.562 m (5' 1.5\")   Wt 74.8 kg (164 lb 14.4 oz)   SpO2 97%   BMI 30.65 kg/m    Body mass index is 30.65 kg/m .  Physical Exam   GENERAL: alert and no distress  EYES: Eyes grossly normal to inspection, PERRL and conjunctivae and sclerae normal  HENT: ear canals and TM's normal, nose and mouth without ulcers or lesions  NECK: no adenopathy, no asymmetry, masses, or scars  RESP: lungs clear to auscultation - no rales, rhonchi or wheezes  CV: regular rate and rhythm, normal S1 S2, no S3 or S4, no murmur, click or rub, no peripheral edema  ABDOMEN: soft, nontender, no hepatosplenomegaly, no masses and bowel sounds normal  MS: no gross musculoskeletal defects noted, no edema  SKIN: note lesions on her nose and around her eye.  Scaling , flesh colored. Arms are the same. All sun changes .    NEURO: Normal strength and tone, mentation intact and speech normal  BACK: no CVA tenderness, no paralumbar tenderness  PSYCH: mentation appears normal, affect normal/bright  LYMPH: no cervical, supraclavicular, axillary, or inguinal adenopathy    No results found for any visits on 07/22/24.        Signed Electronically by: FELIX Joe    "

## 2024-08-07 DIAGNOSIS — F41.1 GAD (GENERALIZED ANXIETY DISORDER): ICD-10-CM

## 2024-08-07 DIAGNOSIS — L50.1 CHRONIC IDIOPATHIC URTICARIA: ICD-10-CM

## 2024-08-07 RX ORDER — ESCITALOPRAM OXALATE 20 MG/1
20 TABLET ORAL DAILY
Qty: 30 TABLET | Refills: 11 | Status: SHIPPED | OUTPATIENT
Start: 2024-08-07

## 2024-09-05 ENCOUNTER — OFFICE VISIT (OUTPATIENT)
Dept: CHIROPRACTIC MEDICINE | Facility: OTHER | Age: 64
End: 2024-09-05
Attending: CHIROPRACTOR
Payer: COMMERCIAL

## 2024-09-05 DIAGNOSIS — M54.50 ACUTE BILATERAL LOW BACK PAIN WITHOUT SCIATICA: ICD-10-CM

## 2024-09-05 DIAGNOSIS — M99.01 SEGMENTAL AND SOMATIC DYSFUNCTION OF CERVICAL REGION: ICD-10-CM

## 2024-09-05 DIAGNOSIS — M99.02 SEGMENTAL AND SOMATIC DYSFUNCTION OF THORACIC REGION: ICD-10-CM

## 2024-09-05 DIAGNOSIS — M99.03 SEGMENTAL AND SOMATIC DYSFUNCTION OF LUMBAR REGION: Primary | ICD-10-CM

## 2024-09-05 PROCEDURE — 98941 CHIROPRACT MANJ 3-4 REGIONS: CPT | Mod: AT | Performed by: CHIROPRACTOR

## 2024-09-11 DIAGNOSIS — F33.1 MAJOR DEPRESSIVE DISORDER, RECURRENT EPISODE, MODERATE (H): ICD-10-CM

## 2024-09-11 RX ORDER — ARIPIPRAZOLE 2 MG/1
2 TABLET ORAL DAILY
Qty: 30 TABLET | Refills: 9 | Status: SHIPPED | OUTPATIENT
Start: 2024-09-11

## 2024-09-11 NOTE — TELEPHONE ENCOUNTER
Abilify      Last Written Prescription Date:  9/6/23  Last Fill Quantity: 30,   # refills: 11  Last Office Visit: 7/22/24  Future Office visit:       Routing refill request to provider for review/approval because:

## 2024-09-12 NOTE — PROGRESS NOTES
Subjective Finding:    Chief compalint: Patient presents with:  Back Pain: With neck pain   , Pain Scale: 4/10, Intensity: sharp, Duration: 2 weeks, Radiating: bilateral buttock.    Date of injury:     Activities that the pain restricts:   Home/household/hobbies/social activities: Yes.  Work duties: No.  Sleep: No.  Makes symptoms better: rest.  Makes symptoms worse: walking.  Have you seen anyone else for the symptoms? No.  Work related: No.  Automobile related injury: No.    Objective and Assessment:    Posture Analysis:   High shoulder: .  Head tilt: .  High iliac crest: .  Head carriage: neutral.  Thoracic Kyphosis: neutral.  Lumbar Lordosis: forward.    Lumbar Range of Motion: flexion decreased and extension decreased.  Cervical Range of Motion: .  Thoracic Range of Motion: .  Extremity Range of Motion: .    Palpation:   Quad lumb: bilateral, referred pain: no    Segmental dysfunction pre-treatment and treatment area: C6, T5, L4, and L5.    Assessment post-treatment:  Cervical: ROM increased.  Thoracic: ROM increased.  Lumbar: ROM increased.    Comments: .      Complicating Factors: .    Procedure(s):  CMT:  35093 Chiropractic manipulative treatment 3-4 regions performed   Cervical: Diversified, See above for level, Supine, Thoracic: Diversified, See above for level, Prone, and Lumbar: Diversified, See above for level, Side posture    Modalities:  None performed this visit    Therapeutic procedures:  None    Plan:  Treatment plan: PRN.  Instructed patient: stretch as instructed at visit.  Short term goals: reduce pain.  Long term goals: increase ADL.  Prognosis: very good.

## 2024-10-07 DIAGNOSIS — F41.1 GAD (GENERALIZED ANXIETY DISORDER): ICD-10-CM

## 2024-10-08 NOTE — TELEPHONE ENCOUNTER
CLONAZEPAM 0.5 MG TABLET       Last Written Prescription Date:  06/07/2024  Last Fill Quantity: 60,   # refills: 3  Last Office Visit: 07/22/2024  Future Office visit:       Routing refill request to provider for review/approval because:    Kimberly Boecker, RN

## 2024-10-10 RX ORDER — CLONAZEPAM 0.5 MG/1
0.5 TABLET ORAL 2 TIMES DAILY
Qty: 60 TABLET | Refills: 0 | Status: SHIPPED | OUTPATIENT
Start: 2024-10-10 | End: 2024-11-05

## 2024-11-05 DIAGNOSIS — F41.1 GAD (GENERALIZED ANXIETY DISORDER): ICD-10-CM

## 2024-11-05 RX ORDER — CLONAZEPAM 0.5 MG/1
0.5 TABLET ORAL 2 TIMES DAILY
Qty: 60 TABLET | Refills: 0 | Status: SHIPPED | OUTPATIENT
Start: 2024-11-05

## 2024-11-05 NOTE — TELEPHONE ENCOUNTER
Clonazepam 0.5 mg      Last Written Prescription Date:  10/10/2024  Last Fill Quantity: 60,   # refills: 0  Last Office Visit: 07/22/2024  Future Office visit:

## 2024-11-12 ENCOUNTER — PREP FOR PROCEDURE (OUTPATIENT)
Dept: SURGERY | Facility: CLINIC | Age: 64
End: 2024-11-12

## 2024-11-12 ENCOUNTER — TRANSFERRED RECORDS (OUTPATIENT)
Dept: HEALTH INFORMATION MANAGEMENT | Facility: CLINIC | Age: 64
End: 2024-11-12

## 2024-11-12 DIAGNOSIS — M17.12 PRIMARY OSTEOARTHRITIS OF LEFT KNEE: Primary | ICD-10-CM

## 2024-11-24 ENCOUNTER — HEALTH MAINTENANCE LETTER (OUTPATIENT)
Age: 64
End: 2024-11-24

## 2025-01-13 DIAGNOSIS — F41.1 GAD (GENERALIZED ANXIETY DISORDER): ICD-10-CM

## 2025-01-13 NOTE — TELEPHONE ENCOUNTER
Clonazepam 0.5 mg tab      Last Written Prescription Date:  12-13-24  Last Fill Quantity: 60,   # refills: 0  Last Office Visit: 9-6-23  Future Office visit:    Next 5 appointments (look out 90 days)      Klever 15, 2025 9:15 AM  (Arrive by 9:00 AM)  Pre-Operative Physical with FELIX Davis  [facility] [place] ([facility] [place] ) [address]  [place] [address]  [phone]

## 2025-01-14 RX ORDER — CLONAZEPAM 0.5 MG/1
0.5 TABLET ORAL 2 TIMES DAILY
Qty: 60 TABLET | Refills: 0 | Status: SHIPPED | OUTPATIENT
Start: 2025-01-14 | End: 2025-01-15

## 2025-01-15 ENCOUNTER — APPOINTMENT (OUTPATIENT)
Dept: LAB | Facility: OTHER | Age: 65
End: 2025-01-15
Attending: PHYSICIAN ASSISTANT
Payer: COMMERCIAL

## 2025-01-15 ENCOUNTER — OFFICE VISIT (OUTPATIENT)
Dept: FAMILY MEDICINE | Facility: OTHER | Age: 65
End: 2025-01-15
Attending: PHYSICIAN ASSISTANT
Payer: COMMERCIAL

## 2025-01-15 VITALS
WEIGHT: 174.3 LBS | DIASTOLIC BLOOD PRESSURE: 74 MMHG | HEART RATE: 80 BPM | TEMPERATURE: 97.9 F | HEIGHT: 62 IN | OXYGEN SATURATION: 95 % | RESPIRATION RATE: 16 BRPM | SYSTOLIC BLOOD PRESSURE: 108 MMHG | BODY MASS INDEX: 32.07 KG/M2

## 2025-01-15 DIAGNOSIS — E78.5 HYPERLIPIDEMIA LDL GOAL <100: ICD-10-CM

## 2025-01-15 DIAGNOSIS — Z01.818 PREOP GENERAL PHYSICAL EXAM: Primary | ICD-10-CM

## 2025-01-15 LAB
ANION GAP SERPL CALCULATED.3IONS-SCNC: 15 MMOL/L (ref 7–15)
BASOPHILS # BLD AUTO: 0 10E3/UL (ref 0–0.2)
BASOPHILS NFR BLD AUTO: 0 %
BUN SERPL-MCNC: 7.8 MG/DL (ref 8–23)
CALCIUM SERPL-MCNC: 9.6 MG/DL (ref 8.8–10.4)
CHLORIDE SERPL-SCNC: 103 MMOL/L (ref 98–107)
CHOLEST SERPL-MCNC: 238 MG/DL
CREAT SERPL-MCNC: 0.7 MG/DL (ref 0.51–0.95)
EGFRCR SERPLBLD CKD-EPI 2021: >90 ML/MIN/1.73M2
EOSINOPHIL # BLD AUTO: 0.1 10E3/UL (ref 0–0.7)
EOSINOPHIL NFR BLD AUTO: 2 %
ERYTHROCYTE [DISTWIDTH] IN BLOOD BY AUTOMATED COUNT: 16.6 % (ref 10–15)
FASTING STATUS PATIENT QL REPORTED: NO
FASTING STATUS PATIENT QL REPORTED: NO
GLUCOSE SERPL-MCNC: 94 MG/DL (ref 70–99)
HCO3 SERPL-SCNC: 20 MMOL/L (ref 22–29)
HCT VFR BLD AUTO: 35.7 % (ref 35–47)
HDLC SERPL-MCNC: 66 MG/DL
HGB BLD-MCNC: 11.6 G/DL (ref 11.7–15.7)
IMM GRANULOCYTES # BLD: 0 10E3/UL
IMM GRANULOCYTES NFR BLD: 0 %
LDLC SERPL CALC-MCNC: 145 MG/DL
LYMPHOCYTES # BLD AUTO: 2.3 10E3/UL (ref 0.8–5.3)
LYMPHOCYTES NFR BLD AUTO: 39 %
MCH RBC QN AUTO: 25.3 PG (ref 26.5–33)
MCHC RBC AUTO-ENTMCNC: 32.5 G/DL (ref 31.5–36.5)
MCV RBC AUTO: 78 FL (ref 78–100)
MONOCYTES # BLD AUTO: 0.3 10E3/UL (ref 0–1.3)
MONOCYTES NFR BLD AUTO: 5 %
NEUTROPHILS # BLD AUTO: 3.2 10E3/UL (ref 1.6–8.3)
NEUTROPHILS NFR BLD AUTO: 54 %
NONHDLC SERPL-MCNC: 172 MG/DL
NRBC # BLD AUTO: 0 10E3/UL
NRBC BLD AUTO-RTO: 0 /100
PLATELET # BLD AUTO: 326 10E3/UL (ref 150–450)
POTASSIUM SERPL-SCNC: 3.6 MMOL/L (ref 3.4–5.3)
RBC # BLD AUTO: 4.58 10E6/UL (ref 3.8–5.2)
SODIUM SERPL-SCNC: 138 MMOL/L (ref 135–145)
TRIGL SERPL-MCNC: 134 MG/DL
WBC # BLD AUTO: 5.9 10E3/UL (ref 4–11)

## 2025-01-15 PROCEDURE — 82435 ASSAY OF BLOOD CHLORIDE: CPT | Mod: ZL | Performed by: PHYSICIAN ASSISTANT

## 2025-01-15 PROCEDURE — 36415 COLL VENOUS BLD VENIPUNCTURE: CPT | Mod: ZL | Performed by: PHYSICIAN ASSISTANT

## 2025-01-15 PROCEDURE — 82565 ASSAY OF CREATININE: CPT | Mod: ZL | Performed by: PHYSICIAN ASSISTANT

## 2025-01-15 PROCEDURE — 80061 LIPID PANEL: CPT | Mod: ZL | Performed by: PHYSICIAN ASSISTANT

## 2025-01-15 PROCEDURE — G0463 HOSPITAL OUTPT CLINIC VISIT: HCPCS

## 2025-01-15 PROCEDURE — 80048 BASIC METABOLIC PNL TOTAL CA: CPT | Mod: ZL | Performed by: PHYSICIAN ASSISTANT

## 2025-01-15 PROCEDURE — 85004 AUTOMATED DIFF WBC COUNT: CPT | Mod: ZL | Performed by: PHYSICIAN ASSISTANT

## 2025-01-15 ASSESSMENT — ANXIETY QUESTIONNAIRES
8. IF YOU CHECKED OFF ANY PROBLEMS, HOW DIFFICULT HAVE THESE MADE IT FOR YOU TO DO YOUR WORK, TAKE CARE OF THINGS AT HOME, OR GET ALONG WITH OTHER PEOPLE?: NOT DIFFICULT AT ALL
GAD7 TOTAL SCORE: 0
7. FEELING AFRAID AS IF SOMETHING AWFUL MIGHT HAPPEN: NOT AT ALL
GAD7 TOTAL SCORE: 0
4. TROUBLE RELAXING: NOT AT ALL
IF YOU CHECKED OFF ANY PROBLEMS ON THIS QUESTIONNAIRE, HOW DIFFICULT HAVE THESE PROBLEMS MADE IT FOR YOU TO DO YOUR WORK, TAKE CARE OF THINGS AT HOME, OR GET ALONG WITH OTHER PEOPLE: NOT DIFFICULT AT ALL
5. BEING SO RESTLESS THAT IT IS HARD TO SIT STILL: NOT AT ALL
7. FEELING AFRAID AS IF SOMETHING AWFUL MIGHT HAPPEN: NOT AT ALL
1. FEELING NERVOUS, ANXIOUS, OR ON EDGE: NOT AT ALL
2. NOT BEING ABLE TO STOP OR CONTROL WORRYING: NOT AT ALL
GAD7 TOTAL SCORE: 0
6. BECOMING EASILY ANNOYED OR IRRITABLE: NOT AT ALL
3. WORRYING TOO MUCH ABOUT DIFFERENT THINGS: NOT AT ALL

## 2025-01-15 ASSESSMENT — PAIN SCALES - GENERAL: PAINLEVEL_OUTOF10: SEVERE PAIN (6)

## 2025-01-15 ASSESSMENT — PATIENT HEALTH QUESTIONNAIRE - PHQ9
SUM OF ALL RESPONSES TO PHQ QUESTIONS 1-9: 3
SUM OF ALL RESPONSES TO PHQ QUESTIONS 1-9: 3
10. IF YOU CHECKED OFF ANY PROBLEMS, HOW DIFFICULT HAVE THESE PROBLEMS MADE IT FOR YOU TO DO YOUR WORK, TAKE CARE OF THINGS AT HOME, OR GET ALONG WITH OTHER PEOPLE: SOMEWHAT DIFFICULT

## 2025-01-15 NOTE — H&P (VIEW-ONLY)
Preoperative Evaluation  [facility] [place]  [address]  [place] [address]  Phone: [phone]  Primary Provider: FELIX Joe  Pre-op Performing Provider: FELIX Joe  Klever 15, 2025             1/12/2025   Surgical Information   What procedure is being done? Left total knee arthroplasty    Facility or Hospital where procedure/surgery will be performed: [facility]   Who is doing the procedure / surgery? Dr Lima   Date of surgery / procedure: 2/3/2025   Time of surgery / procedure: 0810   Where do you plan to recover after surgery? at home with family     Fax number for surgical facility: Note does not need to be faxed, will be available electronically in Epic.    Assessment & Plan     The proposed surgical procedure is considered INTERMEDIATE risk.    Preop general physical exam  She has a right knee replacement. Will be having her left done. Has been more than 2 years for her previous replacement.  She has a mild anemia.  Not severe enough   - Basic metabolic panel; Future  - CBC with platelets and differential; Future  - EKG 12-lead complete w/read - (Clinic Performed)  - CBC with platelets and differential  - Basic metabolic panel    Hyperlipidemia   She is going to be given recheck on hits.  Not on cholesterol medications and has gained weight wanting to her some thing for that.   - Lipid Profile (Chol, Trig, HDL, LDL calc)            - No identified additional risk factors other than previously addressed         Recommendation  Approval given to proceed with proposed procedure, without further diagnostic evaluation.    Nisha Tineo is a 64 year old, presenting for the following:  Pre-Op Exam          1/15/2025     8:56 AM   Additional Questions   Roomed by Savi Olivares   Accompanied by self         1/15/2025     8:56 AM   Patient Reported Additional Medications   Patient reports taking the following new medications none     HPI related to upcoming procedure: she needs  to have her left knee replaced.  Has severe pain and disability. Has a right knee replacement that was done more than 2 years ago. No need for profilactin antibiotics.         1/12/2025   Pre-Op Questionnaire   Have you ever had a heart attack or stroke? No   Have you ever had surgery on your heart or blood vessels, such as a stent placement, a coronary artery bypass, or surgery on an artery in your head, neck, heart, or legs? No   Do you have chest pain with activity? No   Do you have a history of heart failure? No   Do you currently have a cold, bronchitis or symptoms of other infection? No   Do you have a cough, shortness of breath, or wheezing? No   Do you or anyone in your family have previous history of blood clots? No   Do you or does anyone in your family have a serious bleeding problem such as prolonged bleeding following surgeries or cuts? No   Have you ever had problems with anemia or been told to take iron pills? (!) YES donates blood and has been denied due to low iron   Have you had any abnormal blood loss such as black, tarry or bloody stools, or abnormal vaginal bleeding? No   Have you ever had a blood transfusion? No   Are you willing to have a blood transfusion if it is medically needed before, during, or after your surgery? Yes   Have you or any of your relatives ever had problems with anesthesia? No   Do you have sleep apnea, excessive snoring or daytime drowsiness? No   Do you have any artifical heart valves or other implanted medical devices like a pacemaker, defibrillator, or continuous glucose monitor? No   Do you have artificial joints? (!) YES   Are you allergic to latex? No     Health Care Directive  Patient does not have a Health Care Directive: Discussed advance care planning with patient; however, patient declined at this time.    Preoperative Review of    reviewed - controlled substances reflected in medication list.      Status of Chronic Conditions:  DEPRESSION - Patient has a  "long history of Depression of moderate severity requiring medication for control with recent symptoms being gradually improving..Current symptoms of depression include fatigue.     Patient Active Problem List    Diagnosis Date Noted    Arthritis of left knee 11/11/2022     Priority: Medium    Fall, subsequent encounter 10/19/2020     Priority: Medium    Closed head injury, subsequent encounter 10/19/2020     Priority: Medium    Significant closed head trauma within past 3 months 09/21/2020     Priority: Medium    Acute recurrent maxillary sinusitis 09/21/2020     Priority: Medium    Special screening for malignant neoplasms, colon 02/26/2020     Priority: Medium     Added automatically from request for surgery [id number]      RADHA (generalized anxiety disorder) 08/18/2019     Priority: Medium    Chronic idiopathic urticaria 08/18/2019     Priority: Medium      Past Medical History:   Diagnosis Date    Arthritis 2020    Mostly hands and knees    Depressive disorder     Depressive disorder, not elsewhere classified 07/23/2008    Dysthymic disorder 08/20/2008     Past Surgical History:   Procedure Laterality Date    ABDOMEN SURGERY  1995?    AS TOTAL HIP ARTHROPLASTY Left 03/02/2017    basilar joint injection, left thumb  03/02/2017    COLONOSCOPY  05/20/2006    COLONOSCOPY N/A 06/18/2020    Procedure: COLONOSCOPY;  Surgeon: Bradly Jauregui MD;  Location: [facility] OR    hand  20009    RT    HYSTERECTOMY TOTAL ABDOMINAL N/A 2000    Cervix removed. Ovaries remain.     [facility] TOTAL HIP ARTHROPLASTY Left 03/02/2017     Current Outpatient Medications   Medication Sig Dispense Refill    ARIPiprazole (ABILIFY) 2 MG tablet TAKE 1 TABLET BY MOUTH ONCE DAILY. 30 tablet 9    escitalopram (LEXAPRO) 20 MG tablet TAKE 1 TABLET BY MOUTH ONCE DAILY. 30 tablet 11    modafinil (PROVIGIL) 200 MG tablet TAKE 1 TABLET BY MOUTH DAILY 30 tablet 4       Allergies   Allergen Reactions    Cats      \"Cat/feline product derivatives\"        Social History " "    Tobacco Use    Smoking status: Some Days     Current packs/day: 0.00     Types: Cigarettes    Smokeless tobacco: Current    Tobacco comments:     Quit ; no passive exposure   Substance Use Topics    Alcohol use: Yes     Comment: Socially     Family History   Problem Relation Age of Onset    Hypertension Mother     Other - See Comments Father         Alzheimer's Disease    C.A.D. Father     Cancer Brother     Hypertension Son     Mental Illness Son     Prostate Cancer Brother             Other Cancer Brother             Depression Sister      History   Drug Use No             Review of Systems  Constitutional, HEENT, cardiovascular, pulmonary, GI, , musculoskeletal, neuro, skin, endocrine and psych systems are negative, except as otherwise noted.    Objective    /74 (BP Location: Right arm, Patient Position: Sitting, Cuff Size: Adult Large)   Pulse 80   Temp 97.9  F (36.6  C) (Tympanic)   Resp 16   Ht 1.562 m (5' 1.5\")   Wt 79.1 kg (174 lb 4.8 oz)   SpO2 95%   BMI 32.40 kg/m     Estimated body mass index is 32.4 kg/m  as calculated from the following:    Height as of this encounter: 1.562 m (5' 1.5\").    Weight as of this encounter: 79.1 kg (174 lb 4.8 oz).  Physical Exam  GENERAL: alert and no distress  EYES: Eyes grossly normal to inspection, PERRL and conjunctivae and sclerae normal  HENT: ear canals and TM's normal, nose and mouth without ulcers or lesions  NECK: no adenopathy, no asymmetry, masses, or scars  RESP: lungs clear to auscultation - no rales, rhonchi or wheezes  CV: regular rate and rhythm, normal S1 S2, no S3 or S4, no murmur, click or rub, no peripheral edema  ABDOMEN: soft, nontender, no hepatosplenomegaly, no masses and bowel sounds normal  MS: no gross musculoskeletal defects noted, no edema, scar on right knee.  Full movement of left knee only when sitting. Standing pain.   SKIN: no suspicious lesions or rashes  NEURO: Normal strength and tone, mentation " intact and speech normal  PSYCH: mentation appears normal, affect normal/bright    Recent Labs   Lab Test 03/15/24  1539   HGB 11.0*         POTASSIUM 3.7   CR 0.76      Results for orders placed or performed in visit on 01/15/25   Basic metabolic panel     Status: Abnormal   Result Value Ref Range    Sodium 138 135 - 145 mmol/L    Potassium 3.6 3.4 - 5.3 mmol/L    Chloride 103 98 - 107 mmol/L    Carbon Dioxide (CO2) 20 (L) 22 - 29 mmol/L    Anion Gap 15 7 - 15 mmol/L    Urea Nitrogen 7.8 (L) 8.0 - 23.0 mg/dL    Creatinine 0.70 0.51 - 0.95 mg/dL    GFR Estimate >90 >60 mL/min/1.73m2    Calcium 9.6 8.8 - 10.4 mg/dL    Glucose 94 70 - 99 mg/dL    Patient Fasting > 8hrs? No    Lipid Profile (Chol, Trig, HDL, LDL calc)     Status: Abnormal   Result Value Ref Range    Cholesterol 238 (H) <200 mg/dL    Triglycerides 134 <150 mg/dL    Direct Measure HDL 66 >=50 mg/dL    LDL Cholesterol Calculated 145 (H) <100 mg/dL    Non HDL Cholesterol 172 (H) <130 mg/dL    Patient Fasting > 8hrs? No     Narrative    Cholesterol  Desirable: < 200 mg/dL  Borderline High: 200 - 239 mg/dL  High: >= 240 mg/dL    Triglycerides  Normal: < 150 mg/dL  Borderline High: 150 - 199 mg/dL  High: 200-499 mg/dL  Very High: >= 500 mg/dL    Direct Measure HDL  Female: >= 50 mg/dL   Male: >= 40 mg/dL    LDL Cholesterol  Desirable: < 100 mg/dL  Above Desirable: 100 - 129 mg/dL   Borderline High: 130 - 159 mg/dL   High:  160 - 189 mg/dL   Very High: >= 190 mg/dL    Non HDL Cholesterol  Desirable: < 130 mg/dL  Above Desirable: 130 - 159 mg/dL  Borderline High: 160 - 189 mg/dL  High: 190 - 219 mg/dL  Very High: >= 220 mg/dL   CBC with platelets and differential     Status: Abnormal   Result Value Ref Range    WBC Count 5.9 4.0 - 11.0 10e3/uL    RBC Count 4.58 3.80 - 5.20 10e6/uL    Hemoglobin 11.6 (L) 11.7 - 15.7 g/dL    Hematocrit 35.7 35.0 - 47.0 %    MCV 78 78 - 100 fL    MCH 25.3 (L) 26.5 - 33.0 pg    MCHC 32.5 31.5 - 36.5 g/dL    RDW 16.6  (H) 10.0 - 15.0 %    Platelet Count 326 150 - 450 10e3/uL    % Neutrophils 54 %    % Lymphocytes 39 %    % Monocytes 5 %    % Eosinophils 2 %    % Basophils 0 %    % Immature Granulocytes 0 %    NRBCs per 100 WBC 0 <1 /100    Absolute Neutrophils 3.2 1.6 - 8.3 10e3/uL    Absolute Lymphocytes 2.3 0.8 - 5.3 10e3/uL    Absolute Monocytes 0.3 0.0 - 1.3 10e3/uL    Absolute Eosinophils 0.1 0.0 - 0.7 10e3/uL    Absolute Basophils 0.0 0.0 - 0.2 10e3/uL    Absolute Immature Granulocytes 0.0 <=0.4 10e3/uL    Absolute NRBCs 0.0 10e3/uL   EKG 12-lead complete w/read - (Clinic Performed)     Status: None (Preliminary result)   Result Value Ref Range    Systolic Blood Pressure  mmHg    Diastolic Blood Pressure  mmHg    Ventricular Rate 71 BPM    Atrial Rate 71 BPM    MN Interval 142 ms    QRS Duration 74 ms     ms    QTc 445 ms    P Axis 15 degrees    R AXIS -1 degrees    T Axis 1 degrees    Interpretation ECG       Sinus rhythm  Cannot rule out Anterior infarct (cited on or before 15-Mar-2024)  Abnormal ECG  When compared with ECG of 15-Mar-2024 15:55,  Nonspecific T wave abnormality, improved in Anterior leads     CBC with platelets and differential     Status: Abnormal    Narrative    The following orders were created for panel order CBC with platelets and differential.  Procedure                               Abnormality         Status                     ---------                               -----------         ------                     CBC with platelets and d...[[id number]]  Abnormal            Final result                 Please view results for these tests on the individual orders.       Diagnostics  Recent Results (from the past 24 hours)   Basic metabolic panel    Collection Time: 01/15/25  9:01 AM   Result Value Ref Range    Sodium 138 135 - 145 mmol/L    Potassium 3.6 3.4 - 5.3 mmol/L    Chloride 103 98 - 107 mmol/L    Carbon Dioxide (CO2) 20 (L) 22 - 29 mmol/L    Anion Gap 15 7 - 15 mmol/L    Urea Nitrogen  7.8 (L) 8.0 - 23.0 mg/dL    Creatinine 0.70 0.51 - 0.95 mg/dL    GFR Estimate >90 >60 mL/min/1.73m2    Calcium 9.6 8.8 - 10.4 mg/dL    Glucose 94 70 - 99 mg/dL    Patient Fasting > 8hrs? No    Lipid Profile (Chol, Trig, HDL, LDL calc)    Collection Time: 01/15/25  9:01 AM   Result Value Ref Range    Cholesterol 238 (H) <200 mg/dL    Triglycerides 134 <150 mg/dL    Direct Measure HDL 66 >=50 mg/dL    LDL Cholesterol Calculated 145 (H) <100 mg/dL    Non HDL Cholesterol 172 (H) <130 mg/dL    Patient Fasting > 8hrs? No    CBC with platelets and differential    Collection Time: 01/15/25  9:01 AM   Result Value Ref Range    WBC Count 5.9 4.0 - 11.0 10e3/uL    RBC Count 4.58 3.80 - 5.20 10e6/uL    Hemoglobin 11.6 (L) 11.7 - 15.7 g/dL    Hematocrit 35.7 35.0 - 47.0 %    MCV 78 78 - 100 fL    MCH 25.3 (L) 26.5 - 33.0 pg    MCHC 32.5 31.5 - 36.5 g/dL    RDW 16.6 (H) 10.0 - 15.0 %    Platelet Count 326 150 - 450 10e3/uL    % Neutrophils 54 %    % Lymphocytes 39 %    % Monocytes 5 %    % Eosinophils 2 %    % Basophils 0 %    % Immature Granulocytes 0 %    NRBCs per 100 WBC 0 <1 /100    Absolute Neutrophils 3.2 1.6 - 8.3 10e3/uL    Absolute Lymphocytes 2.3 0.8 - 5.3 10e3/uL    Absolute Monocytes 0.3 0.0 - 1.3 10e3/uL    Absolute Eosinophils 0.1 0.0 - 0.7 10e3/uL    Absolute Basophils 0.0 0.0 - 0.2 10e3/uL    Absolute Immature Granulocytes 0.0 <=0.4 10e3/uL    Absolute NRBCs 0.0 10e3/uL   EKG 12-lead complete w/read - (Clinic Performed)    Collection Time: 01/15/25  9:20 AM   Result Value Ref Range    Systolic Blood Pressure  mmHg    Diastolic Blood Pressure  mmHg    Ventricular Rate 71 BPM    Atrial Rate 71 BPM    WI Interval 142 ms    QRS Duration 74 ms     ms    QTc 445 ms    P Axis 15 degrees    R AXIS -1 degrees    T Axis 1 degrees    Interpretation ECG       Sinus rhythm  Cannot rule out Anterior infarct (cited on or before 15-Mar-2024)  Abnormal ECG  When compared with ECG of 15-Mar-2024 15:55,  Nonspecific T wave  abnormality, improved in Anterior leads        EKG: appears normal, NSR, normal axis, normal intervals, no acute ST/T changes c/w ischemia, no LVH by voltage criteria, unchanged from previous tracings    Revised Cardiac Risk Index (RCRI)  The patient has the following serious cardiovascular risks for perioperative complications:   - No serious cardiac risks = 0 points     RCRI Interpretation: 0 points: Class I (very low risk - 0.4% complication rate)         Signed Electronically by: FELIX Joe  A copy of this evaluation report is provided to the requesting physician.         Answers submitted by the patient for this visit:  Patient Health Questionnaire (Submitted on 1/15/2025)  If you checked off any problems, how difficult have these problems made it for you to do your work, take care of things at home, or get along with other people?: Somewhat difficult  PHQ9 TOTAL SCORE: 3  Patient Health Questionnaire (G7) (Submitted on 1/15/2025)  RADHA 7 TOTAL SCORE: 0

## 2025-01-15 NOTE — PROGRESS NOTES
Preoperative Evaluation  [facility] [place]  [address]  [place] [address]  Phone: [phone]  Primary Provider: FELIX Joe  Pre-op Performing Provider: FELIX Joe  Klever 15, 2025             1/12/2025   Surgical Information   What procedure is being done? Left total knee arthroplasty    Facility or Hospital where procedure/surgery will be performed: [place]   Who is doing the procedure / surgery? Dr Lima   Date of surgery / procedure: 2/3/2025   Time of surgery / procedure: 0810   Where do you plan to recover after surgery? at home with family     Fax number for surgical facility: Note does not need to be faxed, will be available electronically in Epic.    Assessment & Plan     The proposed surgical procedure is considered INTERMEDIATE risk.    Preop general physical exam  She has a right knee replacement. Will be having her left done. Has been more than 2 years for her previous replacement.  She has a mild anemia.  Not severe enough   - Basic metabolic panel; Future  - CBC with platelets and differential; Future  - EKG 12-lead complete w/read - (Clinic Performed)  - CBC with platelets and differential  - Basic metabolic panel    Hyperlipidemia   She is going to be given recheck on hits.  Not on cholesterol medications and has gained weight wanting to her some thing for that.   - Lipid Profile (Chol, Trig, HDL, LDL calc)            - No identified additional risk factors other than previously addressed         Recommendation  Approval given to proceed with proposed procedure, without further diagnostic evaluation.    Nisha Tineo is a 64 year old, presenting for the following:  Pre-Op Exam          1/15/2025     8:56 AM   Additional Questions   Roomed by Savi Olivares   Accompanied by self         1/15/2025     8:56 AM   Patient Reported Additional Medications   Patient reports taking the following new medications none     HPI related to upcoming procedure: she needs  to have her left knee replaced.  Has severe pain and disability. Has a right knee replacement that was done more than 2 years ago. No need for profilactin antibiotics.         1/12/2025   Pre-Op Questionnaire   Have you ever had a heart attack or stroke? No   Have you ever had surgery on your heart or blood vessels, such as a stent placement, a coronary artery bypass, or surgery on an artery in your head, neck, heart, or legs? No   Do you have chest pain with activity? No   Do you have a history of heart failure? No   Do you currently have a cold, bronchitis or symptoms of other infection? No   Do you have a cough, shortness of breath, or wheezing? No   Do you or anyone in your family have previous history of blood clots? No   Do you or does anyone in your family have a serious bleeding problem such as prolonged bleeding following surgeries or cuts? No   Have you ever had problems with anemia or been told to take iron pills? (!) YES donates blood and has been denied due to low iron   Have you had any abnormal blood loss such as black, tarry or bloody stools, or abnormal vaginal bleeding? No   Have you ever had a blood transfusion? No   Are you willing to have a blood transfusion if it is medically needed before, during, or after your surgery? Yes   Have you or any of your relatives ever had problems with anesthesia? No   Do you have sleep apnea, excessive snoring or daytime drowsiness? No   Do you have any artifical heart valves or other implanted medical devices like a pacemaker, defibrillator, or continuous glucose monitor? No   Do you have artificial joints? (!) YES   Are you allergic to latex? No     Health Care Directive  Patient does not have a Health Care Directive: Discussed advance care planning with patient; however, patient declined at this time.    Preoperative Review of    reviewed - controlled substances reflected in medication list.      Status of Chronic Conditions:  DEPRESSION - Patient has a  "long history of Depression of moderate severity requiring medication for control with recent symptoms being gradually improving..Current symptoms of depression include fatigue.     Patient Active Problem List    Diagnosis Date Noted    Arthritis of left knee 11/11/2022     Priority: Medium    Fall, subsequent encounter 10/19/2020     Priority: Medium    Closed head injury, subsequent encounter 10/19/2020     Priority: Medium    Significant closed head trauma within past 3 months 09/21/2020     Priority: Medium    Acute recurrent maxillary sinusitis 09/21/2020     Priority: Medium    Special screening for malignant neoplasms, colon 02/26/2020     Priority: Medium     Added automatically from request for surgery [id number]      RADHA (generalized anxiety disorder) 08/18/2019     Priority: Medium    Chronic idiopathic urticaria 08/18/2019     Priority: Medium      Past Medical History:   Diagnosis Date    Arthritis 2020    Mostly hands and knees    Depressive disorder     Depressive disorder, not elsewhere classified 07/23/2008    Dysthymic disorder 08/20/2008     Past Surgical History:   Procedure Laterality Date    ABDOMEN SURGERY  1995?    AS TOTAL HIP ARTHROPLASTY Left 03/02/2017    basilar joint injection, left thumb  03/02/2017    COLONOSCOPY  05/20/2006    COLONOSCOPY N/A 06/18/2020    Procedure: COLONOSCOPY;  Surgeon: Bradly Jauregui MD;  Location: [facility] OR    hand  20009    RT    HYSTERECTOMY TOTAL ABDOMINAL N/A 2000    Cervix removed. Ovaries remain.     [facility] TOTAL HIP ARTHROPLASTY Left 03/02/2017     Current Outpatient Medications   Medication Sig Dispense Refill    ARIPiprazole (ABILIFY) 2 MG tablet TAKE 1 TABLET BY MOUTH ONCE DAILY. 30 tablet 9    escitalopram (LEXAPRO) 20 MG tablet TAKE 1 TABLET BY MOUTH ONCE DAILY. 30 tablet 11    modafinil (PROVIGIL) 200 MG tablet TAKE 1 TABLET BY MOUTH DAILY 30 tablet 4       Allergies   Allergen Reactions    Cats      \"Cat/feline product derivatives\"        Social History " "    Tobacco Use    Smoking status: Some Days     Current packs/day: 0.00     Types: Cigarettes    Smokeless tobacco: Current    Tobacco comments:     Quit ; no passive exposure   Substance Use Topics    Alcohol use: Yes     Comment: Socially     Family History   Problem Relation Age of Onset    Hypertension Mother     Other - See Comments Father         Alzheimer's Disease    C.A.D. Father     Cancer Brother     Hypertension Son     Mental Illness Son     Prostate Cancer Brother             Other Cancer Brother             Depression Sister      History   Drug Use No             Review of Systems  Constitutional, HEENT, cardiovascular, pulmonary, GI, , musculoskeletal, neuro, skin, endocrine and psych systems are negative, except as otherwise noted.    Objective    /74 (BP Location: Right arm, Patient Position: Sitting, Cuff Size: Adult Large)   Pulse 80   Temp 97.9  F (36.6  C) (Tympanic)   Resp 16   Ht 1.562 m (5' 1.5\")   Wt 79.1 kg (174 lb 4.8 oz)   SpO2 95%   BMI 32.40 kg/m     Estimated body mass index is 32.4 kg/m  as calculated from the following:    Height as of this encounter: 1.562 m (5' 1.5\").    Weight as of this encounter: 79.1 kg (174 lb 4.8 oz).  Physical Exam  GENERAL: alert and no distress  EYES: Eyes grossly normal to inspection, PERRL and conjunctivae and sclerae normal  HENT: ear canals and TM's normal, nose and mouth without ulcers or lesions  NECK: no adenopathy, no asymmetry, masses, or scars  RESP: lungs clear to auscultation - no rales, rhonchi or wheezes  CV: regular rate and rhythm, normal S1 S2, no S3 or S4, no murmur, click or rub, no peripheral edema  ABDOMEN: soft, nontender, no hepatosplenomegaly, no masses and bowel sounds normal  MS: no gross musculoskeletal defects noted, no edema, scar on right knee.  Full movement of left knee only when sitting. Standing pain.   SKIN: no suspicious lesions or rashes  NEURO: Normal strength and tone, mentation " intact and speech normal  PSYCH: mentation appears normal, affect normal/bright    Recent Labs   Lab Test 03/15/24  1539   HGB 11.0*         POTASSIUM 3.7   CR 0.76      Results for orders placed or performed in visit on 01/15/25   Basic metabolic panel     Status: Abnormal   Result Value Ref Range    Sodium 138 135 - 145 mmol/L    Potassium 3.6 3.4 - 5.3 mmol/L    Chloride 103 98 - 107 mmol/L    Carbon Dioxide (CO2) 20 (L) 22 - 29 mmol/L    Anion Gap 15 7 - 15 mmol/L    Urea Nitrogen 7.8 (L) 8.0 - 23.0 mg/dL    Creatinine 0.70 0.51 - 0.95 mg/dL    GFR Estimate >90 >60 mL/min/1.73m2    Calcium 9.6 8.8 - 10.4 mg/dL    Glucose 94 70 - 99 mg/dL    Patient Fasting > 8hrs? No    Lipid Profile (Chol, Trig, HDL, LDL calc)     Status: Abnormal   Result Value Ref Range    Cholesterol 238 (H) <200 mg/dL    Triglycerides 134 <150 mg/dL    Direct Measure HDL 66 >=50 mg/dL    LDL Cholesterol Calculated 145 (H) <100 mg/dL    Non HDL Cholesterol 172 (H) <130 mg/dL    Patient Fasting > 8hrs? No     Narrative    Cholesterol  Desirable: < 200 mg/dL  Borderline High: 200 - 239 mg/dL  High: >= 240 mg/dL    Triglycerides  Normal: < 150 mg/dL  Borderline High: 150 - 199 mg/dL  High: 200-499 mg/dL  Very High: >= 500 mg/dL    Direct Measure HDL  Female: >= 50 mg/dL   Male: >= 40 mg/dL    LDL Cholesterol  Desirable: < 100 mg/dL  Above Desirable: 100 - 129 mg/dL   Borderline High: 130 - 159 mg/dL   High:  160 - 189 mg/dL   Very High: >= 190 mg/dL    Non HDL Cholesterol  Desirable: < 130 mg/dL  Above Desirable: 130 - 159 mg/dL  Borderline High: 160 - 189 mg/dL  High: 190 - 219 mg/dL  Very High: >= 220 mg/dL   CBC with platelets and differential     Status: Abnormal   Result Value Ref Range    WBC Count 5.9 4.0 - 11.0 10e3/uL    RBC Count 4.58 3.80 - 5.20 10e6/uL    Hemoglobin 11.6 (L) 11.7 - 15.7 g/dL    Hematocrit 35.7 35.0 - 47.0 %    MCV 78 78 - 100 fL    MCH 25.3 (L) 26.5 - 33.0 pg    MCHC 32.5 31.5 - 36.5 g/dL    RDW 16.6  (H) 10.0 - 15.0 %    Platelet Count 326 150 - 450 10e3/uL    % Neutrophils 54 %    % Lymphocytes 39 %    % Monocytes 5 %    % Eosinophils 2 %    % Basophils 0 %    % Immature Granulocytes 0 %    NRBCs per 100 WBC 0 <1 /100    Absolute Neutrophils 3.2 1.6 - 8.3 10e3/uL    Absolute Lymphocytes 2.3 0.8 - 5.3 10e3/uL    Absolute Monocytes 0.3 0.0 - 1.3 10e3/uL    Absolute Eosinophils 0.1 0.0 - 0.7 10e3/uL    Absolute Basophils 0.0 0.0 - 0.2 10e3/uL    Absolute Immature Granulocytes 0.0 <=0.4 10e3/uL    Absolute NRBCs 0.0 10e3/uL   EKG 12-lead complete w/read - (Clinic Performed)     Status: None (Preliminary result)   Result Value Ref Range    Systolic Blood Pressure  mmHg    Diastolic Blood Pressure  mmHg    Ventricular Rate 71 BPM    Atrial Rate 71 BPM    MO Interval 142 ms    QRS Duration 74 ms     ms    QTc 445 ms    P Axis 15 degrees    R AXIS -1 degrees    T Axis 1 degrees    Interpretation ECG       Sinus rhythm  Cannot rule out Anterior infarct (cited on or before 15-Mar-2024)  Abnormal ECG  When compared with ECG of 15-Mar-2024 15:55,  Nonspecific T wave abnormality, improved in Anterior leads     CBC with platelets and differential     Status: Abnormal    Narrative    The following orders were created for panel order CBC with platelets and differential.  Procedure                               Abnormality         Status                     ---------                               -----------         ------                     CBC with platelets and d...[[id number]]  Abnormal            Final result                 Please view results for these tests on the individual orders.       Diagnostics  Recent Results (from the past 24 hours)   Basic metabolic panel    Collection Time: 01/15/25  9:01 AM   Result Value Ref Range    Sodium 138 135 - 145 mmol/L    Potassium 3.6 3.4 - 5.3 mmol/L    Chloride 103 98 - 107 mmol/L    Carbon Dioxide (CO2) 20 (L) 22 - 29 mmol/L    Anion Gap 15 7 - 15 mmol/L    Urea Nitrogen  7.8 (L) 8.0 - 23.0 mg/dL    Creatinine 0.70 0.51 - 0.95 mg/dL    GFR Estimate >90 >60 mL/min/1.73m2    Calcium 9.6 8.8 - 10.4 mg/dL    Glucose 94 70 - 99 mg/dL    Patient Fasting > 8hrs? No    Lipid Profile (Chol, Trig, HDL, LDL calc)    Collection Time: 01/15/25  9:01 AM   Result Value Ref Range    Cholesterol 238 (H) <200 mg/dL    Triglycerides 134 <150 mg/dL    Direct Measure HDL 66 >=50 mg/dL    LDL Cholesterol Calculated 145 (H) <100 mg/dL    Non HDL Cholesterol 172 (H) <130 mg/dL    Patient Fasting > 8hrs? No    CBC with platelets and differential    Collection Time: 01/15/25  9:01 AM   Result Value Ref Range    WBC Count 5.9 4.0 - 11.0 10e3/uL    RBC Count 4.58 3.80 - 5.20 10e6/uL    Hemoglobin 11.6 (L) 11.7 - 15.7 g/dL    Hematocrit 35.7 35.0 - 47.0 %    MCV 78 78 - 100 fL    MCH 25.3 (L) 26.5 - 33.0 pg    MCHC 32.5 31.5 - 36.5 g/dL    RDW 16.6 (H) 10.0 - 15.0 %    Platelet Count 326 150 - 450 10e3/uL    % Neutrophils 54 %    % Lymphocytes 39 %    % Monocytes 5 %    % Eosinophils 2 %    % Basophils 0 %    % Immature Granulocytes 0 %    NRBCs per 100 WBC 0 <1 /100    Absolute Neutrophils 3.2 1.6 - 8.3 10e3/uL    Absolute Lymphocytes 2.3 0.8 - 5.3 10e3/uL    Absolute Monocytes 0.3 0.0 - 1.3 10e3/uL    Absolute Eosinophils 0.1 0.0 - 0.7 10e3/uL    Absolute Basophils 0.0 0.0 - 0.2 10e3/uL    Absolute Immature Granulocytes 0.0 <=0.4 10e3/uL    Absolute NRBCs 0.0 10e3/uL   EKG 12-lead complete w/read - (Clinic Performed)    Collection Time: 01/15/25  9:20 AM   Result Value Ref Range    Systolic Blood Pressure  mmHg    Diastolic Blood Pressure  mmHg    Ventricular Rate 71 BPM    Atrial Rate 71 BPM    OK Interval 142 ms    QRS Duration 74 ms     ms    QTc 445 ms    P Axis 15 degrees    R AXIS -1 degrees    T Axis 1 degrees    Interpretation ECG       Sinus rhythm  Cannot rule out Anterior infarct (cited on or before 15-Mar-2024)  Abnormal ECG  When compared with ECG of 15-Mar-2024 15:55,  Nonspecific T wave  abnormality, improved in Anterior leads        EKG: appears normal, NSR, normal axis, normal intervals, no acute ST/T changes c/w ischemia, no LVH by voltage criteria, unchanged from previous tracings    Revised Cardiac Risk Index (RCRI)  The patient has the following serious cardiovascular risks for perioperative complications:   - No serious cardiac risks = 0 points     RCRI Interpretation: 0 points: Class I (very low risk - 0.4% complication rate)         Signed Electronically by: FELIX Joe  A copy of this evaluation report is provided to the requesting physician.         Answers submitted by the patient for this visit:  Patient Health Questionnaire (Submitted on 1/15/2025)  If you checked off any problems, how difficult have these problems made it for you to do your work, take care of things at home, or get along with other people?: Somewhat difficult  PHQ9 TOTAL SCORE: 3  Patient Health Questionnaire (G7) (Submitted on 1/15/2025)  RADHA 7 TOTAL SCORE: 0

## 2025-01-16 LAB
ATRIAL RATE - MUSE: 71 BPM
DIASTOLIC BLOOD PRESSURE - MUSE: NORMAL MMHG
INTERPRETATION ECG - MUSE: NORMAL
P AXIS - MUSE: 15 DEGREES
PR INTERVAL - MUSE: 142 MS
QRS DURATION - MUSE: 74 MS
QT - MUSE: 410 MS
QTC - MUSE: 445 MS
R AXIS - MUSE: -1 DEGREES
SYSTOLIC BLOOD PRESSURE - MUSE: NORMAL MMHG
T AXIS - MUSE: 1 DEGREES
VENTRICULAR RATE- MUSE: 71 BPM

## 2025-01-29 ENCOUNTER — ANESTHESIA EVENT (OUTPATIENT)
Dept: SURGERY | Facility: HOSPITAL | Age: 65
End: 2025-01-29
Payer: COMMERCIAL

## 2025-01-29 NOTE — ANESTHESIA PREPROCEDURE EVALUATION
"Anesthesia Pre-Procedure Evaluation    Patient: Estella ORO Aas   MRN: [id number] : 1960        Procedure : Procedure(s):  Left total knee arthroplasty          Past Medical History:   Diagnosis Date     Arthritis     Mostly hands and knees     Depressive disorder      Depressive disorder, not elsewhere classified 2008     Dysthymic disorder 2008      Past Surgical History:   Procedure Laterality Date     ABDOMEN SURGERY  ?     AS TOTAL HIP ARTHROPLASTY Left 2017     basilar joint injection, left thumb  2017     COLONOSCOPY  2006     COLONOSCOPY N/A 2020    Procedure: COLONOSCOPY;  Surgeon: Bradly Jauregui MD;  Location: [facility] OR     hand      RT     HYSTERECTOMY TOTAL ABDOMINAL N/A     Cervix removed. Ovaries remain.      ZZC TOTAL HIP ARTHROPLASTY Left 2017      Allergies   Allergen Reactions     Cats      \"Cat/feline product derivatives\"      Social History     Tobacco Use     Smoking status: Every Day     Smokeless tobacco: Current     Tobacco comments:     Quit ; no passive exposure. Vapes daily.   Substance Use Topics     Alcohol use: Yes     Comment: Socially      Wt Readings from Last 1 Encounters:   01/15/25 79.1 kg (174 lb 4.8 oz)        Anesthesia Evaluation   Pt has had prior anesthetic. Type: MAC.    No history of anesthetic complications       ROS/MED HX  ENT/Pulmonary:     (+)                tobacco use (vapes, nicotine), Current use,   patient smoked within 24 hours,                    Neurologic: Comment: 2020 fall - hit head - imaging negative - dx with contusion/concussion      Cardiovascular:     (+) Dyslipidemia - -   -  - -                                 Previous cardiac testing   Echo: Date: Results:    Stress Test:  Date: Results:    ECG Reviewed:  Date: 1/15/25 Results:  HR 71  Sinus rhythm   Cannot rule out Anterior infarct (cited on or before 15-Mar-2024)   Abnormal ECG   When compared with ECG of 15-Mar-2024 15:55, " "  Nonspecific T wave abnormality, improved in Anterior leads   Confirmed by MD Khan Anthony ([id number]) on 1/16/2025 3:07:27 PM     Cath:  Date: Results:      METS/Exercise Tolerance: 4 - Raking leaves, gardening    Hematologic:     (+)      anemia,          Musculoskeletal:   (+)  arthritis,             GI/Hepatic:  - neg GI/hepatic ROS     Renal/Genitourinary:  - neg Renal ROS     Endo:     (+)               Obesity,       Psychiatric/Substance Use:     (+) psychiatric history depression and anxiety       Infectious Disease:  - neg infectious disease ROS     Malignancy:  - neg malignancy ROS     Other: Comment: Chronic idiopathic urticaria     (+)  , H/O Chronic Pain (pain 7/10 knee),         Physical Exam    Airway        Mallampati: II   TM distance: < 3 FB   Neck ROM: full   Mouth opening: < 3 cm    Respiratory Devices and Support         Dental       (+) Multiple crowns, permanant bridges      Cardiovascular   cardiovascular exam normal       Rhythm and rate: regular and normal     Pulmonary   pulmonary exam normal        breath sounds clear to auscultation       OUTSIDE LABS:  CBC:   Lab Results   Component Value Date    WBC 5.9 01/15/2025    WBC 7.3 03/15/2024    HGB 11.6 (L) 01/15/2025    HGB 11.0 (L) 03/15/2024    HCT 35.7 01/15/2025    HCT 35.4 03/15/2024     01/15/2025     03/15/2024     BMP:   Lab Results   Component Value Date     01/15/2025     03/15/2024    POTASSIUM 3.6 01/15/2025    POTASSIUM 3.7 03/15/2024    CHLORIDE 103 01/15/2025    CHLORIDE 101 03/15/2024    CO2 20 (L) 01/15/2025    CO2 27 03/15/2024    BUN 7.8 (L) 01/15/2025    BUN 7.3 (L) 03/15/2024    CR 0.70 01/15/2025    CR 0.76 03/15/2024    GLC 94 01/15/2025    GLC 85 03/15/2024     COAGS: No results found for: \"PTT\", \"INR\", \"FIBR\"  POC: No results found for: \"BGM\", \"HCG\", \"HCGS\"  HEPATIC:   Lab Results   Component Value Date    ALBUMIN 4.6 03/15/2024    PROTTOTAL 7.5 03/15/2024    ALT 9 03/15/2024    AST " "17 03/15/2024    ALKPHOS 83 03/15/2024    BILITOTAL <0.2 03/15/2024     OTHER:   Lab Results   Component Value Date    A1C 5.3 11/05/2021    RUTHANN 9.6 01/15/2025    TSH 2.23 03/15/2024    CRP <2.9 06/21/2021    SED 10 06/21/2021       Anesthesia Plan    ASA Status:  2    NPO Status:  NPO Appropriate (black coffee 5am)    Anesthesia Type: Spinal.              Consents    Anesthesia Plan(s) and associated risks, benefits, and realistic alternatives discussed. Questions answered and patient/representative(s) expressed understanding.     - Discussed:     - Discussed with:  Patient       Use of blood products discussed: Yes.     - Discussed with: Patient.     - Consented: consented to blood products     Postoperative Care    Pain management: Peripheral nerve block (Single Shot).        Comments:    Other Comments: Reviewed 1/15 DEBRA toribio    Discussed risks and benefits of spinal anesthetic with patient including itching, sore back, infection, hematoma, spinal headache, CV complications, nerve damage, inability to place, conversion to general anesthesia, loss of airway, and death. Pt wishes to proceed.     Discussed risks and benefits of nerve blocks including infection, nerve damage and vascular injection. Patient wishes to proceed.                Mary Adame, POONAM CNP    I have reviewed the pertinent notes and labs in the chart from the past 30 days. Any updates or changes from those notes are reflected in this note.    Clinically Significant Risk Factors Present on Admission                             # Obesity: Estimated body mass index is 32.4 kg/m  as calculated from the following:    Height as of 1/15/25: 1.562 m (5' 1.5\").    Weight as of 1/15/25: 79.1 kg (174 lb 4.8 oz).                "

## 2025-02-03 ENCOUNTER — ANESTHESIA (OUTPATIENT)
Dept: SURGERY | Facility: HOSPITAL | Age: 65
End: 2025-02-03
Payer: COMMERCIAL

## 2025-02-03 ENCOUNTER — APPOINTMENT (OUTPATIENT)
Dept: PHYSICAL THERAPY | Facility: HOSPITAL | Age: 65
End: 2025-02-03
Attending: PHYSICIAN ASSISTANT
Payer: COMMERCIAL

## 2025-02-03 ENCOUNTER — APPOINTMENT (OUTPATIENT)
Dept: ULTRASOUND IMAGING | Facility: HOSPITAL | Age: 65
End: 2025-02-03
Attending: NURSE ANESTHETIST, CERTIFIED REGISTERED
Payer: COMMERCIAL

## 2025-02-03 ENCOUNTER — HOSPITAL ENCOUNTER (OUTPATIENT)
Facility: HOSPITAL | Age: 65
Discharge: HOME | End: 2025-02-03
Attending: ORTHOPAEDIC SURGERY | Admitting: ORTHOPAEDIC SURGERY
Payer: COMMERCIAL

## 2025-02-03 ENCOUNTER — APPOINTMENT (OUTPATIENT)
Dept: GENERAL RADIOLOGY | Facility: HOSPITAL | Age: 65
End: 2025-02-03
Attending: ORTHOPAEDIC SURGERY
Payer: COMMERCIAL

## 2025-02-03 VITALS
WEIGHT: 172 LBS | RESPIRATION RATE: 16 BRPM | SYSTOLIC BLOOD PRESSURE: 134 MMHG | OXYGEN SATURATION: 95 % | HEIGHT: 62 IN | BODY MASS INDEX: 31.65 KG/M2 | DIASTOLIC BLOOD PRESSURE: 80 MMHG | HEART RATE: 51 BPM | TEMPERATURE: 97.5 F

## 2025-02-03 DIAGNOSIS — Z96.652 S/P TOTAL KNEE ARTHROPLASTY, LEFT: Primary | ICD-10-CM

## 2025-02-03 PROBLEM — Z96.651 S/P TOTAL KNEE ARTHROPLASTY, RIGHT: Status: ACTIVE | Noted: 2025-02-03

## 2025-02-03 PROCEDURE — 27447 TOTAL KNEE ARTHROPLASTY: CPT | Mod: LT | Performed by: ORTHOPAEDIC SURGERY

## 2025-02-03 PROCEDURE — 999N000141 HC STATISTIC PRE-PROCEDURE NURSING ASSESSMENT: Performed by: ORTHOPAEDIC SURGERY

## 2025-02-03 PROCEDURE — 250N000011 HC RX IP 250 OP 636: Mod: JW | Performed by: NURSE ANESTHETIST, CERTIFIED REGISTERED

## 2025-02-03 PROCEDURE — 250N000013 HC RX MED GY IP 250 OP 250 PS 637: Performed by: PHYSICIAN ASSISTANT

## 2025-02-03 PROCEDURE — 250N000009 HC RX 250: Performed by: ORTHOPAEDIC SURGERY

## 2025-02-03 PROCEDURE — 710N000010 HC RECOVERY PHASE 1, LEVEL 2, PER MIN: Performed by: ORTHOPAEDIC SURGERY

## 2025-02-03 PROCEDURE — 370N000017 HC ANESTHESIA TECHNICAL FEE, PER MIN: Performed by: ORTHOPAEDIC SURGERY

## 2025-02-03 PROCEDURE — 250N000011 HC RX IP 250 OP 636

## 2025-02-03 PROCEDURE — 250N000009 HC RX 250: Performed by: NURSE ANESTHETIST, CERTIFIED REGISTERED

## 2025-02-03 PROCEDURE — 258N000003 HC RX IP 258 OP 636: Performed by: NURSE PRACTITIONER

## 2025-02-03 PROCEDURE — 360N000077 HC SURGERY LEVEL 4, PER MIN: Performed by: ORTHOPAEDIC SURGERY

## 2025-02-03 PROCEDURE — C1776 JOINT DEVICE (IMPLANTABLE): HCPCS | Performed by: ORTHOPAEDIC SURGERY

## 2025-02-03 PROCEDURE — 272N000001 HC OR GENERAL SUPPLY STERILE: Performed by: ORTHOPAEDIC SURGERY

## 2025-02-03 PROCEDURE — C1713 ANCHOR/SCREW BN/BN,TIS/BN: HCPCS | Performed by: ORTHOPAEDIC SURGERY

## 2025-02-03 PROCEDURE — 710N000012 HC RECOVERY PHASE 2, PER MINUTE: Performed by: ORTHOPAEDIC SURGERY

## 2025-02-03 PROCEDURE — 258N000003 HC RX IP 258 OP 636: Performed by: NURSE ANESTHETIST, CERTIFIED REGISTERED

## 2025-02-03 PROCEDURE — 73560 X-RAY EXAM OF KNEE 1 OR 2: CPT | Mod: LT

## 2025-02-03 PROCEDURE — 250N000011 HC RX IP 250 OP 636: Performed by: NURSE ANESTHETIST, CERTIFIED REGISTERED

## 2025-02-03 PROCEDURE — 250N000011 HC RX IP 250 OP 636: Performed by: PHYSICIAN ASSISTANT

## 2025-02-03 PROCEDURE — 97161 PT EVAL LOW COMPLEX 20 MIN: CPT | Mod: GP

## 2025-02-03 DEVICE — IMPLANTABLE DEVICE: Type: IMPLANTABLE DEVICE | Site: KNEE | Status: FUNCTIONAL

## 2025-02-03 DEVICE — BONE CEMENT SIMPLEX FULL DOSE 6191-1-001: Type: IMPLANTABLE DEVICE | Site: KNEE | Status: FUNCTIONAL

## 2025-02-03 DEVICE — IMP COMP PATELLA SNN JOURNEY BCS RESURF RD 32MM STD: Type: IMPLANTABLE DEVICE | Site: KNEE | Status: FUNCTIONAL

## 2025-02-03 RX ORDER — HYDROMORPHONE HYDROCHLORIDE 1 MG/ML
0.4 INJECTION, SOLUTION INTRAMUSCULAR; INTRAVENOUS; SUBCUTANEOUS
Status: DISCONTINUED | OUTPATIENT
Start: 2025-02-03 | End: 2025-02-03 | Stop reason: HOSPADM

## 2025-02-03 RX ORDER — NALOXONE HYDROCHLORIDE 0.4 MG/ML
0.2 INJECTION, SOLUTION INTRAMUSCULAR; INTRAVENOUS; SUBCUTANEOUS
Status: DISCONTINUED | OUTPATIENT
Start: 2025-02-03 | End: 2025-02-03 | Stop reason: HOSPADM

## 2025-02-03 RX ORDER — POLYETHYLENE GLYCOL 3350 17 G/17G
17 POWDER, FOR SOLUTION ORAL DAILY
Status: DISCONTINUED | OUTPATIENT
Start: 2025-02-04 | End: 2025-02-03 | Stop reason: HOSPADM

## 2025-02-03 RX ORDER — PROCHLORPERAZINE MALEATE 5 MG/1
5 TABLET ORAL EVERY 6 HOURS PRN
Status: DISCONTINUED | OUTPATIENT
Start: 2025-02-03 | End: 2025-02-03 | Stop reason: HOSPADM

## 2025-02-03 RX ORDER — AMOXICILLIN 250 MG
1 CAPSULE ORAL 2 TIMES DAILY
Status: DISCONTINUED | OUTPATIENT
Start: 2025-02-03 | End: 2025-02-03 | Stop reason: HOSPADM

## 2025-02-03 RX ORDER — BUPIVACAINE HYDROCHLORIDE AND EPINEPHRINE 2.5; 5 MG/ML; UG/ML
INJECTION, SOLUTION INFILTRATION; PERINEURAL PRN
Status: DISCONTINUED | OUTPATIENT
Start: 2025-02-03 | End: 2025-02-03 | Stop reason: HOSPADM

## 2025-02-03 RX ORDER — ONDANSETRON 2 MG/ML
4 INJECTION INTRAMUSCULAR; INTRAVENOUS EVERY 30 MIN PRN
Status: DISCONTINUED | OUTPATIENT
Start: 2025-02-03 | End: 2025-02-03 | Stop reason: HOSPADM

## 2025-02-03 RX ORDER — ONDANSETRON 4 MG/1
4 TABLET, ORALLY DISINTEGRATING ORAL EVERY 6 HOURS PRN
Status: DISCONTINUED | OUTPATIENT
Start: 2025-02-03 | End: 2025-02-03 | Stop reason: HOSPADM

## 2025-02-03 RX ORDER — DEXAMETHASONE SODIUM PHOSPHATE 10 MG/ML
INJECTION, SOLUTION INTRAMUSCULAR; INTRAVENOUS
Status: COMPLETED
Start: 2025-02-03 | End: 2025-02-03

## 2025-02-03 RX ORDER — SODIUM CHLORIDE, SODIUM LACTATE, POTASSIUM CHLORIDE, CALCIUM CHLORIDE 600; 310; 30; 20 MG/100ML; MG/100ML; MG/100ML; MG/100ML
INJECTION, SOLUTION INTRAVENOUS CONTINUOUS
Status: DISCONTINUED | OUTPATIENT
Start: 2025-02-03 | End: 2025-02-03 | Stop reason: HOSPADM

## 2025-02-03 RX ORDER — MODAFINIL 200 MG/1
200 TABLET ORAL DAILY
Status: DISCONTINUED | OUTPATIENT
Start: 2025-02-03 | End: 2025-02-03 | Stop reason: HOSPADM

## 2025-02-03 RX ORDER — CEFAZOLIN SODIUM/WATER 2 G/20 ML
2 SYRINGE (ML) INTRAVENOUS EVERY 8 HOURS
Status: DISCONTINUED | OUTPATIENT
Start: 2025-02-03 | End: 2025-02-03 | Stop reason: HOSPADM

## 2025-02-03 RX ORDER — BISACODYL 10 MG
10 SUPPOSITORY, RECTAL RECTAL DAILY PRN
Status: DISCONTINUED | OUTPATIENT
Start: 2025-02-03 | End: 2025-02-03 | Stop reason: HOSPADM

## 2025-02-03 RX ORDER — HYDROMORPHONE HYDROCHLORIDE 1 MG/ML
0.2 INJECTION, SOLUTION INTRAMUSCULAR; INTRAVENOUS; SUBCUTANEOUS
Status: DISCONTINUED | OUTPATIENT
Start: 2025-02-03 | End: 2025-02-03 | Stop reason: HOSPADM

## 2025-02-03 RX ORDER — DEXAMETHASONE SODIUM PHOSPHATE 10 MG/ML
INJECTION, SOLUTION INTRAMUSCULAR; INTRAVENOUS
Status: COMPLETED | OUTPATIENT
Start: 2025-02-03 | End: 2025-02-03

## 2025-02-03 RX ORDER — TRANEXAMIC ACID 10 MG/ML
1 INJECTION, SOLUTION INTRAVENOUS ONCE
Status: DISCONTINUED | OUTPATIENT
Start: 2025-02-03 | End: 2025-02-03 | Stop reason: HOSPADM

## 2025-02-03 RX ORDER — ASPIRIN 81 MG/1
81 TABLET ORAL 2 TIMES DAILY
Status: DISCONTINUED | OUTPATIENT
Start: 2025-02-04 | End: 2025-02-03 | Stop reason: HOSPADM

## 2025-02-03 RX ORDER — ACETAMINOPHEN 325 MG/1
975 TABLET ORAL EVERY 8 HOURS
Status: DISCONTINUED | OUTPATIENT
Start: 2025-02-03 | End: 2025-02-03 | Stop reason: HOSPADM

## 2025-02-03 RX ORDER — OXYCODONE HYDROCHLORIDE 5 MG/1
10 TABLET ORAL EVERY 4 HOURS PRN
Status: DISCONTINUED | OUTPATIENT
Start: 2025-02-03 | End: 2025-02-03 | Stop reason: HOSPADM

## 2025-02-03 RX ORDER — NALOXONE HYDROCHLORIDE 0.4 MG/ML
0.4 INJECTION, SOLUTION INTRAMUSCULAR; INTRAVENOUS; SUBCUTANEOUS
Status: DISCONTINUED | OUTPATIENT
Start: 2025-02-03 | End: 2025-02-03 | Stop reason: HOSPADM

## 2025-02-03 RX ORDER — CHLOROPROCAINE HYDROCHLORIDE 20 MG/ML
INJECTION, SOLUTION EPIDURAL; INFILTRATION; INTRACAUDAL; PERINEURAL
Status: DISCONTINUED
Start: 2025-02-03 | End: 2025-02-03 | Stop reason: HOSPADM

## 2025-02-03 RX ORDER — HYDROMORPHONE HYDROCHLORIDE 1 MG/ML
0.4 INJECTION, SOLUTION INTRAMUSCULAR; INTRAVENOUS; SUBCUTANEOUS EVERY 5 MIN PRN
Status: DISCONTINUED | OUTPATIENT
Start: 2025-02-03 | End: 2025-02-03 | Stop reason: HOSPADM

## 2025-02-03 RX ORDER — FENTANYL CITRATE 0.05 MG/ML
INJECTION, SOLUTION INTRAMUSCULAR; INTRAVENOUS PRN
Status: DISCONTINUED | OUTPATIENT
Start: 2025-02-03 | End: 2025-02-03

## 2025-02-03 RX ORDER — ONDANSETRON 4 MG/1
4 TABLET, ORALLY DISINTEGRATING ORAL EVERY 30 MIN PRN
Status: DISCONTINUED | OUTPATIENT
Start: 2025-02-03 | End: 2025-02-03 | Stop reason: HOSPADM

## 2025-02-03 RX ORDER — ONDANSETRON 2 MG/ML
INJECTION INTRAMUSCULAR; INTRAVENOUS PRN
Status: DISCONTINUED | OUTPATIENT
Start: 2025-02-03 | End: 2025-02-03

## 2025-02-03 RX ORDER — PROPOFOL 10 MG/ML
INJECTION, EMULSION INTRAVENOUS CONTINUOUS PRN
Status: DISCONTINUED | OUTPATIENT
Start: 2025-02-03 | End: 2025-02-03

## 2025-02-03 RX ORDER — BUPIVACAINE HYDROCHLORIDE 2.5 MG/ML
INJECTION, SOLUTION EPIDURAL; INFILTRATION; INTRACAUDAL
Status: COMPLETED
Start: 2025-02-03 | End: 2025-02-03

## 2025-02-03 RX ORDER — LIDOCAINE 40 MG/G
CREAM TOPICAL
Status: DISCONTINUED | OUTPATIENT
Start: 2025-02-03 | End: 2025-02-03 | Stop reason: HOSPADM

## 2025-02-03 RX ORDER — BUPIVACAINE HYDROCHLORIDE AND EPINEPHRINE 2.5; 5 MG/ML; UG/ML
INJECTION, SOLUTION EPIDURAL; INFILTRATION; INTRACAUDAL; PERINEURAL
Status: DISCONTINUED
Start: 2025-02-03 | End: 2025-02-03 | Stop reason: HOSPADM

## 2025-02-03 RX ORDER — OXYCODONE HYDROCHLORIDE 5 MG/1
5 TABLET ORAL EVERY 4 HOURS PRN
Status: DISCONTINUED | OUTPATIENT
Start: 2025-02-03 | End: 2025-02-03 | Stop reason: HOSPADM

## 2025-02-03 RX ORDER — ONDANSETRON 2 MG/ML
4 INJECTION INTRAMUSCULAR; INTRAVENOUS EVERY 6 HOURS PRN
Status: DISCONTINUED | OUTPATIENT
Start: 2025-02-03 | End: 2025-02-03 | Stop reason: HOSPADM

## 2025-02-03 RX ORDER — BUPIVACAINE HYDROCHLORIDE 2.5 MG/ML
INJECTION, SOLUTION EPIDURAL; INFILTRATION; INTRACAUDAL
Status: COMPLETED | OUTPATIENT
Start: 2025-02-03 | End: 2025-02-03

## 2025-02-03 RX ORDER — CEFAZOLIN SODIUM/WATER 2 G/20 ML
2 SYRINGE (ML) INTRAVENOUS
Status: COMPLETED | OUTPATIENT
Start: 2025-02-03 | End: 2025-02-03

## 2025-02-03 RX ORDER — ARIPIPRAZOLE 2 MG/1
2 TABLET ORAL DAILY
Status: DISCONTINUED | OUTPATIENT
Start: 2025-02-03 | End: 2025-02-03 | Stop reason: HOSPADM

## 2025-02-03 RX ORDER — FENTANYL CITRATE 50 UG/ML
INJECTION, SOLUTION INTRAMUSCULAR; INTRAVENOUS
Status: COMPLETED
Start: 2025-02-03 | End: 2025-02-03

## 2025-02-03 RX ORDER — FENTANYL CITRATE 50 UG/ML
50 INJECTION, SOLUTION INTRAMUSCULAR; INTRAVENOUS EVERY 5 MIN PRN
Status: DISCONTINUED | OUTPATIENT
Start: 2025-02-03 | End: 2025-02-03 | Stop reason: HOSPADM

## 2025-02-03 RX ORDER — CHLOROPROCAINE HYDROCHLORIDE 20 MG/ML
INJECTION, SOLUTION EPIDURAL; INFILTRATION; INTRACAUDAL; PERINEURAL
Status: COMPLETED | OUTPATIENT
Start: 2025-02-03 | End: 2025-02-03

## 2025-02-03 RX ORDER — NALOXONE HYDROCHLORIDE 0.4 MG/ML
0.1 INJECTION, SOLUTION INTRAMUSCULAR; INTRAVENOUS; SUBCUTANEOUS
Status: DISCONTINUED | OUTPATIENT
Start: 2025-02-03 | End: 2025-02-03 | Stop reason: HOSPADM

## 2025-02-03 RX ORDER — FENTANYL CITRATE 50 UG/ML
25 INJECTION, SOLUTION INTRAMUSCULAR; INTRAVENOUS EVERY 5 MIN PRN
Status: DISCONTINUED | OUTPATIENT
Start: 2025-02-03 | End: 2025-02-03 | Stop reason: HOSPADM

## 2025-02-03 RX ORDER — CEFAZOLIN SODIUM/WATER 2 G/20 ML
2 SYRINGE (ML) INTRAVENOUS SEE ADMIN INSTRUCTIONS
Status: DISCONTINUED | OUTPATIENT
Start: 2025-02-03 | End: 2025-02-03 | Stop reason: HOSPADM

## 2025-02-03 RX ORDER — ESCITALOPRAM OXALATE 10 MG/1
20 TABLET ORAL DAILY
Status: DISCONTINUED | OUTPATIENT
Start: 2025-02-04 | End: 2025-02-03 | Stop reason: HOSPADM

## 2025-02-03 RX ORDER — DEXAMETHASONE SODIUM PHOSPHATE 4 MG/ML
4 INJECTION, SOLUTION INTRA-ARTICULAR; INTRALESIONAL; INTRAMUSCULAR; INTRAVENOUS; SOFT TISSUE
Status: DISCONTINUED | OUTPATIENT
Start: 2025-02-03 | End: 2025-02-03 | Stop reason: HOSPADM

## 2025-02-03 RX ORDER — HYDROMORPHONE HYDROCHLORIDE 1 MG/ML
0.2 INJECTION, SOLUTION INTRAMUSCULAR; INTRAVENOUS; SUBCUTANEOUS EVERY 5 MIN PRN
Status: DISCONTINUED | OUTPATIENT
Start: 2025-02-03 | End: 2025-02-03 | Stop reason: HOSPADM

## 2025-02-03 RX ADMIN — ACETAMINOPHEN 975 MG: 325 TABLET, FILM COATED ORAL at 12:22

## 2025-02-03 RX ADMIN — HYDROMORPHONE HYDROCHLORIDE 0.2 MG: 1 INJECTION, SOLUTION INTRAMUSCULAR; INTRAVENOUS; SUBCUTANEOUS at 12:19

## 2025-02-03 RX ADMIN — BUPIVACAINE HYDROCHLORIDE 20 ML: 2.5 INJECTION, SOLUTION EPIDURAL; INFILTRATION; INTRACAUDAL at 08:03

## 2025-02-03 RX ADMIN — Medication 2 G: at 13:26

## 2025-02-03 RX ADMIN — PHENYLEPHRINE HYDROCHLORIDE 100 MCG: 10 INJECTION INTRAVENOUS at 10:46

## 2025-02-03 RX ADMIN — PHENYLEPHRINE HYDROCHLORIDE 50 MCG: 10 INJECTION INTRAVENOUS at 10:44

## 2025-02-03 RX ADMIN — MIDAZOLAM 2 MG: 1 INJECTION INTRAMUSCULAR; INTRAVENOUS at 08:00

## 2025-02-03 RX ADMIN — DEXAMETHASONE SODIUM PHOSPHATE 5 MG: 10 INJECTION, SOLUTION INTRAMUSCULAR; INTRAVENOUS at 08:00

## 2025-02-03 RX ADMIN — PROPOFOL 150 MCG/KG/MIN: 10 INJECTION, EMULSION INTRAVENOUS at 09:50

## 2025-02-03 RX ADMIN — DEXAMETHASONE SODIUM PHOSPHATE 5 MG: 10 INJECTION, SOLUTION INTRAMUSCULAR; INTRAVENOUS at 08:03

## 2025-02-03 RX ADMIN — Medication 2 G: at 09:32

## 2025-02-03 RX ADMIN — PHENYLEPHRINE HYDROCHLORIDE 100 MCG: 10 INJECTION INTRAVENOUS at 10:07

## 2025-02-03 RX ADMIN — ONDANSETRON 4 MG: 2 INJECTION INTRAMUSCULAR; INTRAVENOUS at 09:48

## 2025-02-03 RX ADMIN — SODIUM CHLORIDE, POTASSIUM CHLORIDE, SODIUM LACTATE AND CALCIUM CHLORIDE: 600; 310; 30; 20 INJECTION, SOLUTION INTRAVENOUS at 08:13

## 2025-02-03 RX ADMIN — TRANEXAMIC ACID 1 G: 1 INJECTION, SOLUTION INTRAVENOUS at 10:44

## 2025-02-03 RX ADMIN — FENTANYL CITRATE 50 MCG: 0.05 INJECTION, SOLUTION INTRAMUSCULAR; INTRAVENOUS at 09:55

## 2025-02-03 RX ADMIN — PHENYLEPHRINE HYDROCHLORIDE 50 MCG: 10 INJECTION INTRAVENOUS at 10:22

## 2025-02-03 RX ADMIN — PHENYLEPHRINE HYDROCHLORIDE 50 MCG: 10 INJECTION INTRAVENOUS at 10:32

## 2025-02-03 RX ADMIN — OXYCODONE HYDROCHLORIDE 5 MG: 5 TABLET ORAL at 12:13

## 2025-02-03 RX ADMIN — BUPIVACAINE HYDROCHLORIDE 15 ML: 2.5 INJECTION, SOLUTION EPIDURAL; INFILTRATION; INTRACAUDAL at 08:05

## 2025-02-03 RX ADMIN — PHENYLEPHRINE HYDROCHLORIDE 100 MCG: 10 INJECTION INTRAVENOUS at 10:59

## 2025-02-03 RX ADMIN — PHENYLEPHRINE HYDROCHLORIDE 50 MCG: 10 INJECTION INTRAVENOUS at 10:17

## 2025-02-03 RX ADMIN — OXYCODONE HYDROCHLORIDE 5 MG: 5 TABLET ORAL at 12:57

## 2025-02-03 RX ADMIN — PHENYLEPHRINE HYDROCHLORIDE 50 MCG: 10 INJECTION INTRAVENOUS at 10:37

## 2025-02-03 RX ADMIN — BUPIVACAINE HYDROCHLORIDE 20 ML: 2.5 INJECTION, SOLUTION EPIDURAL; INFILTRATION; INTRACAUDAL at 08:00

## 2025-02-03 RX ADMIN — TRANEXAMIC ACID 1 G: 1 INJECTION, SOLUTION INTRAVENOUS at 09:47

## 2025-02-03 RX ADMIN — FENTANYL CITRATE 100 MCG: 0.05 INJECTION, SOLUTION INTRAMUSCULAR; INTRAVENOUS at 08:00

## 2025-02-03 RX ADMIN — CHLOROPROCAINE HYDROCHLORIDE 50 MG: 20 INJECTION, SOLUTION EPIDURAL; INFILTRATION; INTRACAUDAL; PERINEURAL at 09:45

## 2025-02-03 RX ADMIN — PHENYLEPHRINE HYDROCHLORIDE 100 MCG: 10 INJECTION INTRAVENOUS at 11:12

## 2025-02-03 ASSESSMENT — ACTIVITIES OF DAILY LIVING (ADL)
ADLS_ACUITY_SCORE: 18

## 2025-02-03 ASSESSMENT — LIFESTYLE VARIABLES: TOBACCO_USE: 1

## 2025-02-03 NOTE — ANESTHESIA POSTPROCEDURE EVALUATION
Patient: Estella ORO Aas    Procedure: Procedure(s):  Left total knee arthroplasty       Anesthesia Type:  Spinal    Note:  Disposition: Outpatient   Postop Pain Control: Uneventful            Sign Out: Well controlled pain   PONV: No   Neuro/Psych: Uneventful            Sign Out: Acceptable/Baseline neuro status   Airway/Respiratory: Uneventful            Sign Out: Acceptable/Baseline resp. status   CV/Hemodynamics: Uneventful            Sign Out: Acceptable CV status; No obvious hypovolemia; No obvious fluid overload   Other NRE: NONE   DID A NON-ROUTINE EVENT OCCUR? No           Last vitals:  Vitals Value Taken Time   /86 02/03/25 1150   Temp 97.5  F (36.4  C) 02/03/25 1150   Pulse 55 02/03/25 1150   Resp 11 02/03/25 1151   SpO2 97 % 02/03/25 1152   Vitals shown include unfiled device data.    Electronically Signed By: POONAM [place] CRNA  February 3, 2025  4:46 PM

## 2025-02-03 NOTE — ANESTHESIA CARE TRANSFER NOTE
Patient: Estella ORO [facility]    Procedure: Procedure(s):  Left total knee arthroplasty       Diagnosis: Primary osteoarthritis of left knee [M17.12]  Diagnosis Additional Information: No value filed.    Anesthesia Type:   Spinal     Note:    Oropharynx: oropharynx clear of all foreign objects  Level of Consciousness: awake  Oxygen Supplementation: room air    Independent Airway: airway patency satisfactory and stable    Vital Signs Stable: post-procedure vital signs reviewed and stable  Report to RN Given: handoff report given  Patient transferred to: PACU    Handoff Report: Identifed the Patient, Identified the Reponsible Provider, Reviewed the pertinent medical history, Discussed the surgical course, Reviewed Intra-OP anesthesia mangement and issues during anesthesia, Set expectations for post-procedure period and Allowed opportunity for questions and acknowledgement of understanding      Vitals:  Vitals Value Taken Time   /80 02/03/25 1124   Temp     Pulse 63 02/03/25 1125   Resp 13 02/03/25 1125   SpO2 93 % 02/03/25 1125   Vitals shown include unfiled device data.    Electronically Signed By: POONAM Ha[place]on CRNA  February 3, 2025  11:27 AM

## 2025-02-03 NOTE — DISCHARGE INSTRUCTIONS
"    Post-Anesthesia Patient Instructions    IMMEDIATELY FOLLOWING SURGERY:  Do not drive or operate machinery for the first twenty four hours after surgery.  Do not make any important decisions for twenty four hours after surgery or while taking narcotic pain medications or sedatives.  If you develop intractable nausea and vomiting or a severe headache please notify your doctor immediately.    FOLLOW-UP:  Please make an appointment with your surgeon as instructed. You do not need to follow up with anesthesia unless specifically instructed to do so.    WOUND CARE INSTRUCTIONS (if applicable):  Keep a dry clean dressing on the anesthesia/puncture wound site if there is drainage.  Once the wound has quit draining you may leave it open to air.  Generally you should leave the bandage intact for twenty four hours unless there is drainage.  If the epidural site drains for more than 36-48 hours please call the anesthesia department.    QUESTIONS?:  Please feel free to call your physician or the hospital  if you have any questions, and they will be happy to assist you.         If you have any questions or concerns, please call the [facility] Triage Line at [phone] during normal business hours, after hours call [phone].     IMPORTANT OBSERVATIONS  Check C-M-S of operative extremity every 4 hours while you are awake for the first 48 hours:    * C: color - should appear normal/pink   * M: motion - able to move fingers and toes.   * S: sensation - able to \"feel\": no numbness, tingling  If you experience changes in C-M-S and/or in severe pain, you may remove the elastic bandages and reapply ot - tight enough to provide support for the gauze dressing but loose enough to improve C-M-S. The gauze dressing should NOT be removed when rewrapping the elastic bandage.   Learning About Using an Incentive Spirometer  What is an incentive spirometer?     An incentive spirometer is a handheld device that " exercises your lungs and measures how much air you can breathe in. It tells you and your doctor how well your lungs are working.  The spirometer can help you practice taking deep breaths. Deep breaths can help open your airways and prevent fluid or mucus from building up in your lungs, and make it easier for you to breathe.  Using the device can help prevent serious lung infections like pneumonia, improve your breathing after you've had pneumonia or surgery, and keep your airways open and lungs active if you can't get out of bed.  How do you use an incentive spirometer?  When you use an incentive spirometer, you breathe in air through a tube that is connected to a large air column containing a piston or ball. As you breathe in, the piston or ball inside the column moves up. The height of the piston or ball shows how much air you breathed in.  You may feel lightheaded when you breathe in deeply for this exercise. If you feel dizzy or feel like you're going to pass out, stop the exercise and rest.  Each time you do this exercise, keep track of your progress by writing down how high the piston or ball moves up the column.  Move the slider on the outside of the large column to the level that you want to reach or that your doctor recommended.  Sit or stand up straight, and hold the spirometer in front of you.   Be sure to keep it level.  To start, breathe out normally. Then close your lips tightly around the mouthpiece.   Make sure that you don't block the mouthpiece with your tongue.  Take a slow, deep breath.   Breathe in as deeply as you can. As you breathe in, the piston or ball inside the large column will move up.  Try to move the piston or ball as high up as you can or to the level your doctor recommended.  When you can't breathe in anymore, hold your breath for 2 to 5 seconds.  Relax, take the mouthpiece out of your mouth, and breathe out normally.  Repeat steps 1 through 5 as many times as your doctor tells you  "to.  After you've taken the recommended number of breaths, try to cough a few times.   This will help loosen any mucus that has built up in your lungs. It will make it easier for you to breathe. If you just had surgery on your belly or chest, hold a pillow over your cut (incision) when you cough.  Follow-up care is a key part of your treatment and safety. Be sure to make and go to all appointments, and call your doctor if you are having problems. It's also a good idea to know your test results and keep a list of the medicines you take.  Where can you learn more?  Go to https://www.[employer].net/patiented  Enter B979 in the search box to learn more about \"Learning About Using an Incentive Spirometer.\"  Current as of: July 31, 2024  Content Version: 14.3    2024 [employer].   Care instructions adapted under license by your healthcare professional. If you have questions about a medical condition or this instruction, always ask your healthcare professional. [employer] disclaims any warranty or liability for your use of this information.    "

## 2025-02-03 NOTE — ANESTHESIA PROCEDURE NOTES
[facility] Procedure Note    Pre-Procedure   Staff -        CRNA: Jose L Negron APRN CRNA       Other Anesthesia Staff: Gali Yang APRN CRNA       Performed By: CRNA       Location: pre-op       Procedure Start/Stop Times: 2/3/2025 8:03 AM and 2/3/2025 8:05 AM       Pre-Anesthestic Checklist: patient identified, IV checked, site marked, risks and benefits discussed, informed consent, monitors and equipment checked, pre-op evaluation, at physician/surgeon's request and post-op pain management  Timeout:       Correct Patient: Yes        Correct Procedure: Yes        Correct Site: Yes        Correct Position: Yes        Correct Laterality: Yes        Site Marked: Yes  Procedure Documentation  Procedure: IPACK         Diagnosis: KNEE REPLACEMENT       Laterality: left       Patient Position: supine       Skin prep: Chloraprep       Needle Type: insulated       Needle Gauge: 20.        Needle Length (Inches): 4        Ultrasound guided       1. Ultrasound was used to identify targeted nerve, plexus, vascular marker, or fascial plane and place a needle adjacent to it in real-time.       2. Ultrasound was used to visualize the spread of anesthetic in close proximity to the above referenced structure.       3. A permanent image is entered into the patient's record.       4. The visualized anatomic structures appeared normal.       5. There were no apparent abnormal pathologic findings.    Assessment/Narrative         The placement was negative for: blood aspirated, painful injection and site bleeding       Paresthesias: No.       Bolus given via needle. no blood aspirated via catheter.        Secured via.        Insertion/Infusion Method: Single Shot       Complications: none       Injection made incrementally with aspirations every 5 mL.    Medication(s) Administered   Bupivacaine 0.25% PF (Infiltration) - Infiltration, Left Knee   20 mL - 2/3/2025 8:03:00 AM  Dexamethasone 10 mg/mL PF (Perineural) -  "Infiltration, Left Knee   5 mg - 2/3/2025 8:03:00 AM  Medication Administration Time: 2/3/2025 8:03 AM      FOR [facility] ([facility]) ONLY:   Pain Team Contact information: please page the Pain Team Via [employer]. Search \"Pain\". During daytime hours, please page the attending first. At night please page the resident first.      "

## 2025-02-03 NOTE — INTERVAL H&P NOTE
"I have reviewed the surgical (or preoperative) H&P that is linked to this encounter, and examined the patient. There are no significant changes    Clinical Conditions Present on Arrival:  Clinically Significant Risk Factors Present on Admission                       # Obesity: Estimated body mass index is 32.4 kg/m  as calculated from the following:    Height as of 1/15/25: 1.562 m (5' 1.5\").    Weight as of 1/15/25: 79.1 kg (174 lb 4.8 oz).       "

## 2025-02-03 NOTE — ANESTHESIA PROCEDURE NOTES
"Intrathecal injection Procedure Note    Pre-Procedure   Staff -        CRNA: Alicia Whatley APRN CRNA       Performed By: CRNA       Location: OR  Procedure Documentation  Procedure: intrathecal injection         Patient Position: sitting       Skin prep: Chloraprep       Insertion Site: L4-5.       Needle Gauge: 25.        # of attempts: 1 and  # of redirects:     Assessment/Narrative         Sensory Level: T6       CSF fluid: clear.    Medication(s) Administered   2% Chloroprocaine PF (Intrathecal) - Intrathecal   50 mg - 2/3/2025 9:45:00 AM    FOR [facility] ([facility]) ONLY:   Pain Team Contact information: please page the Pain Team Via [employer]. Search \"Pain\". During daytime hours, please page the attending first. At night please page the resident first.      "

## 2025-02-03 NOTE — OR NURSING
Patient and responsible adult given discharge instructions with no questions regarding instructions. Julio Cesar score 19/20. Pain level 3/10.  Discharged from unit via wheelchair. Patient discharged to home with family.     Estella ORO Aas was instructed on the use of the Incentive Spirometer (IS) today.  The patient achieved 1500 mLs out of the predicted 1750mLs based on age and height.  The patient will use the IS, 10 breaths every hour while awake followed by a strong cough to keep lungs open and clear.  The patient was also instructed to splint their incision if indicated. The patient will continue to use IS until able to resume normal daily activities.

## 2025-02-03 NOTE — OP NOTE
Preoperative Diagnosis: Left Knee Osteoarthritis    Postoperative Diagnosis: Left Knee Osteoarthritis    Procedure: Left Total Knee Arthroplasty    Surgeon: Grady Lima MD    Assistants: Yajaira LEMON    A skilled first assistant was required for patient positioning, retracting, and carrying out the procedure in a safe and effective manner    Anesthesia:   1) Spinal with Sedation  2) Adductor Canal Femoral Nerve Block  3) Local Injection around surgical site    Findings:    1) Tricompartmental OA   2) Stable TKA with ROM 0-125   3) Central Patella Tracking   4) Adequate hemostasis     Implants:    1) [employer] 2 Size 4 Femoral Component   2) Size 2 Tibial Base Plate   3) Size 32 Patella   4) 12 Poly Insert    Tourniquet Time: 34 min    Estimated Blood Loss: 50 ml    Specimens: None    Drains: None    Complications: None    Indications:   This is a 64 year old patient with long standing left knee pain.  They have failed nonoperative treatment including use of NSAIDs; Tylenol; injections and exercise.  Given the continued symptoms they wished to proceed with a knee replacement.  The risks including pain, bleeding, infection, deep vein thrombosis, pulmonary embolism, myocardial infarction, component failure, need for revision operation was discussed with the patient.  The surgical consent was signed and surgical site was marked.  All questions regarding postoperative disposition were answered.      Description of Procedure:   The patient was administered a adductor canal femoral nerve block in the preoperative area.  They were then taken to the operating room and placed under spinal anesthesia.  A non-sterile tourniquet was applied and the Left leg was prepped with a Chloraprep wipe and Chloraprep device and was draped in standard fashion.  A timeout was called to identify the correct patient and surgical site.  A midline incision and medial parapatellar arthrotomy was completed.   Adequate medial and lateral releases were completed.  The patella was everted and 9.0 mm of bone resection was completed using the patella clamp.  The patella was sized to 32 mm and the lug holes were drilled.  The patella protector plate was placed.  The knee was flexed and the intramedullary canal was drilled.  The intramedullary 5 degree Left distal femoral guide was placed.  Standard bone resection was completed.  The posterior referencing femoral sizing guide was placed and the femur was sized to a 4.  This cutting guide was placed and the 5 chamfer cuts were made.  The ACL, PCL, Medial and Lateral Meniscus were resected.  The tibia was subluxed anteriorly and the posterior retractor was placed.  The extramedullary proximal tibia cutting guide and made 11 mm of bone resection off the less involved lateral compartment.  The flexion and extension gaps were checked and pins were removed.  The Femoral component was placed and the box cut for the posterior stabilizing component was made.  All trial implants were placed and the knee was brought out to extension.  The rotation of the tibial trial was marked.  The knee came out to full extension and flexed to 125 degrees with central patellar tracking.  The tibia was sized to a 2 and prepared in standard fashion.  The periarticular injection was completed and the bone ends were washed and dried.  Final implants were cemented in place.  Betadine and pulse lavage irrigation was used.   A 12 mm poly insert was chosen and confirmed knee ROM and patella tracking.  The arthrotomy was closed with a #1 Vicryl suture in interrupted fashion.  A 0 Vicryl running suture was used in the deep fascia.  The skin was closed with a 2-0 Vicryl and staples.  An Aquacel dressing was applied.  The patient was awakened and transferred to PACU in stable condition.      Postoperative Plan:   Routine TKA protocol (Weightbearing as tolerated, PT, Pain control, DVT prophylaxis with Aspirin).   Anticipate discharge in 1-2 days.  Follow-up in  2 weeks in [facility].  No X-rays needed.

## 2025-02-03 NOTE — ANESTHESIA PROCEDURE NOTES
Genicular Procedure Note    Pre-Procedure   Staff -        CRNA: Jose L Negron APRN CRNA       Other Anesthesia Staff: Gali Yang APRN CRNA       Performed By: CRNA       Location: pre-op       Procedure Start/Stop Times: 2/3/2025 8:05 AM and 2/3/2025 8:08 AM       Pre-Anesthestic Checklist: patient identified, IV checked, site marked, risks and benefits discussed, informed consent, monitors and equipment checked, pre-op evaluation, at physician/surgeon's request and post-op pain management  Timeout:       Correct Patient: Yes        Correct Procedure: Yes        Correct Site: Yes        Correct Position: Yes        Correct Laterality: Yes        Site Marked: Yes  Procedure Documentation  Procedure: Genicular         Diagnosis: KNEE REPLACEMENT       Laterality: left       Patient Position: supine       Skin prep: Chloraprep       Needle Type: insulated       Needle Gauge: 20.        Needle Length (Inches): 4        Ultrasound guided       1. Ultrasound was used to identify targeted nerve, plexus, vascular marker, or fascial plane and place a needle adjacent to it in real-time.       2. Ultrasound was used to visualize the spread of anesthetic in close proximity to the above referenced structure.       3. A permanent image is entered into the patient's record.       4. The visualized anatomic structures appeared normal.       5. There were no apparent abnormal pathologic findings.    Assessment/Narrative         The placement was negative for: blood aspirated, painful injection and site bleeding       Paresthesias: No.       Bolus given via needle. no blood aspirated via catheter.        Secured via.        Insertion/Infusion Method: Single Shot       Complications: none       Injection made incrementally with aspirations every 5 mL.    Medication(s) Administered   Bupivacaine 0.25% PF (Infiltration) - Infiltration, Left Knee   15 mL - 2/3/2025 8:05:00 AM  3 mg dexamethasone PF 10 MG/ML  Medication  "Administration Time: 2/3/2025 8:05 AM      FOR [facility] ([facility]) ONLY:   Pain Team Contact information: please page the Pain Team Via [employer]. Search \"Pain\". During daytime hours, please page the attending first. At night please page the resident first.      "

## 2025-02-03 NOTE — ANESTHESIA PROCEDURE NOTES
Adductor canal Procedure Note    Pre-Procedure   Staff -        CRNA: Jose L Negron APRN CRNA       Other Anesthesia Staff: Gali Yang APRN CRNA       Performed By: CRNA       Location: pre-op       Procedure Start/Stop Times: 2/3/2025 8:00 AM and 2/3/2025 8:02 AM       Pre-Anesthestic Checklist: patient identified, IV checked, site marked, risks and benefits discussed, informed consent, monitors and equipment checked, pre-op evaluation, at physician/surgeon's request and post-op pain management  Timeout:       Correct Patient: Yes        Correct Procedure: Yes        Correct Site: Yes        Correct Position: Yes        Correct Laterality: Yes        Site Marked: Yes  Procedure Documentation  Procedure: Adductor canal         Diagnosis: KNEE REPLACEMENT       Laterality: left       Patient Position: supine       Patient Prep/Sterile Barriers: sterile gloves, mask, patient draped       Skin prep: Chloraprep       Needle Type: insulated       Needle Gauge: 20.        Needle Length (Inches): 4        Ultrasound guided       1. Ultrasound was used to identify targeted nerve, plexus, vascular marker, or fascial plane and place a needle adjacent to it in real-time.       2. Ultrasound was used to visualize the spread of anesthetic in close proximity to the above referenced structure.       3. A permanent image is entered into the patient's record.       4. The visualized anatomic structures appeared normal.       5. There were no apparent abnormal pathologic findings.    Assessment/Narrative         The placement was negative for: blood aspirated, painful injection and site bleeding       Paresthesias: No.       Bolus given via needle. no blood aspirated via catheter.        Secured via.        Insertion/Infusion Method: Single Shot       Complications: none       Injection made incrementally with aspirations every 5 mL.    Medication(s) Administered   Bupivacaine 0.25% PF (Infiltration) - Infiltration,  "Left Thigh   20 mL - 2/3/2025 8:00:00 AM  Dexamethasone 10 mg/mL PF (Perineural) - Infiltration, Left Thigh   5 mg - 2/3/2025 8:00:00 AM  Medication Administration Time: 2/3/2025 8:00 AM      FOR [facility] ([facility]) ONLY:   Pain Team Contact information: please page the Pain Team Via [employer]. Search \"Pain\". During daytime hours, please page the attending first. At night please page the resident first.      "

## 2025-02-04 NOTE — PROGRESS NOTES
02/03/25 1400   Appointment Info   Signing Clinician's Name / Credentials (PT) RAMY De La Cruz   Student Supervision Direct supervision provided;Direct Patient Contact Provided;Therapy services provided with the co-signing licensed therapist guiding and directing the services, and providing the skilled judgement and assessment throughout the session   Rehab Comments (PT) Pt has outpatient physical therapy set up on 2/6/25.   Living Environment   People in Home alone   Current Living Arrangements house   Home Accessibility no concerns;stairs to enter home   Number of Stairs, Main Entrance 3   Stair Railings, Main Entrance railings on both sides of stairs   Number of Stairs, Within Home, Primary greater than 10 stairs   Stair Railings, Within Home, Primary railing on right side (ascending)   Transportation Anticipated family or friend will provide   Living Environment Comments Lives in house with laundry in basement, but does not need to access initially. Sister and brother-in-law will be staying with pt for 1 week. Tub/shower combo with grab bars getting put in. Has shower chair. Toilet seat is raised height, no grab bars near.   Self-Care   Usual Activity Tolerance good   Current Activity Tolerance moderate   Equipment Currently Used at Home cane, straight;grab bar, tub/shower;raised toilet seat;shower chair;walker, standard;commode chair   Fall history within last six months yes   Number of times patient has fallen within last six months 1   Activity/Exercise/Self-Care Comment Indep in self-cares and household chores at baseline. Has FWW and SEC at home.   General Information   Onset of Illness/Injury or Date of Surgery 02/03/25   Referring Physician POONAM Ventura CNP   Patient/Family Therapy Goals Statement (PT) Return home with assist for 1 week   Pertinent History of Current Problem (include personal factors and/or comorbidities that impact the POC) Pt underwent a L TKA on 2/3/25 due to knee  osteoarthritis.   Existing Precautions/Restrictions weight bearing   Weight-Bearing Status - LLE weight-bearing as tolerated   Cognition   Affect/Mental Status (Cognition) WFL   Orientation Status (Cognition) oriented x 4;oriented to;person;place;situation;time   Follows Commands (Cognition) WNL   Pain Assessment   Patient Currently in Pain Yes, see Vital Sign flowsheet  (rates resting pain 3/10)   Integumentary/Edema   Integumentary/Edema no deficits were identifed   Integumentary/Edema Comments Pt had heavy post-op bandaging/ACE-wraps covering LLE from foot to mid thigh.   Range of Motion (ROM)   Range of Motion ROM deficits secondary to surgical procedure   ROM Comment L knee PROM 4-83 deg   Strength (Manual Muscle Testing)   Strength Comments Generally reduced strength of L LE due to surgical procedure and anesthesia. WFL for amb w/ FWW and stair negotiation.   Transfers   Transfers sit-stand transfer   Maintains Weight-bearing Status (Transfers) able to maintain   Sit-Stand Transfer   Sit-Stand [place] (Transfers) modified independence   Assistive Device (Sit-Stand Transfers) walker, front-wheeled   Gait/Stairs (Locomotion)   [place] Level (Gait) modified independence   Assistive Device (Gait) walker, front-wheeled   Distance in Feet (Gait) 300   Pattern (Gait) step-through   Deviations/Abnormal Patterns (Gait) antalgic;gait speed decreased  (mildly antalgic gait pattern; mod/much reduced L knee flexioin during swing phase.)   Maintains Weight-bearing Status (Gait) able to maintain   Negotiation (Stairs) stairs independence;stairs assistive device;handrail location;number of steps   [place] Level (Stairs) modified independence   Assistive Device (Stairs) cane, straight   Handrail Location (Stairs) left side (ascending)   Number of Steps (Stairs) 8   Balance   Balance no deficits were identified   Clinical Impression   Criteria for Skilled Therapeutic Intervention Evaluation only   PT Diagnosis  (PT) Reduced knee ROM and functional mobility   Influenced by the following impairments Weakness and ROM impairments   Functional limitations due to impairments Reduced gait endurance and functional mobility.   Clinical Presentation (PT Evaluation Complexity) stable   Clinical Presentation Rationale Clinical reasoning   Clinical Decision Making (Complexity) low complexity   Planned Therapy Interventions (PT) home exercise program   Risk & Benefits of therapy have been explained evaluation/treatment results reviewed;patient;participants included   Clinical Impression Comments Pt presents today following a L TKA due to knee osteoarthritis. PT eval completed, indicating pt is appropriate to discharge home with assist. Functional mobility is slightly below baseline due to surgical procedure, but pt has adequate ambulation and stair negotiation capacity to return home with help. Pt begins outpatient PT later this week to continue with rehab.   PT Total Evaluation Time   PT Eval, Low Complexity Minutes (78468) 20   Physical Therapy Goals   PT Frequency One time eval and treatment only   PT Predicted Duration/Target Date for Goal Attainment 02/03/25   Interventions   Interventions Quick Adds Therapeutic Procedure   Therapeutic Procedure/Exercise   Ther. Procedure: strength, endurance, ROM, flexibillity Minutes (63005) 5   Treatment Detail/Skilled Intervention Discussed importance of walking every 1-2 hours once home to reduce stiffness and pain. Pt has exercises at home to work on until beginning outpatient PT. Emphasized doing the exercises at least 2 times/day. Pt expressed understanding.   PT Discharge Planning   PT Plan Evaluation only   PT Discharge Recommendation (DC Rec) home with assist   PT Rationale for DC Rec Pt has approopriate functional mobility capacity to negotiate house and complete daily activities with assist following discharge.   PT Brief overview of current status [place] for stairs, amb, transfers w/  FWW/UE support   PT Total Distance Amb During Session (feet) 300   Physical Therapy Time and Intention   Timed Code Treatment Minutes 5   Total Session Time (sum of timed and untimed services) 25

## 2025-02-17 ENCOUNTER — HOSPITAL ENCOUNTER (EMERGENCY)
Facility: HOSPITAL | Age: 65
Discharge: HOME | End: 2025-02-17
Attending: PHYSICIAN ASSISTANT
Payer: COMMERCIAL

## 2025-02-17 ENCOUNTER — APPOINTMENT (OUTPATIENT)
Dept: GENERAL RADIOLOGY | Facility: HOSPITAL | Age: 65
End: 2025-02-17
Attending: PHYSICIAN ASSISTANT
Payer: COMMERCIAL

## 2025-02-17 ENCOUNTER — TELEPHONE (OUTPATIENT)
Dept: FAMILY MEDICINE | Facility: OTHER | Age: 65
End: 2025-02-17

## 2025-02-17 ENCOUNTER — HOSPITAL ENCOUNTER (OUTPATIENT)
Dept: ULTRASOUND IMAGING | Facility: HOSPITAL | Age: 65
Discharge: HOME | End: 2025-02-17
Attending: ORTHOPAEDIC SURGERY | Admitting: ORTHOPAEDIC SURGERY
Payer: COMMERCIAL

## 2025-02-17 ENCOUNTER — MEDICAL CORRESPONDENCE (OUTPATIENT)
Dept: ULTRASOUND IMAGING | Facility: HOSPITAL | Age: 65
End: 2025-02-17

## 2025-02-17 VITALS
TEMPERATURE: 98.6 F | SYSTOLIC BLOOD PRESSURE: 143 MMHG | HEART RATE: 92 BPM | WEIGHT: 176.7 LBS | OXYGEN SATURATION: 95 % | RESPIRATION RATE: 16 BRPM | DIASTOLIC BLOOD PRESSURE: 81 MMHG | BODY MASS INDEX: 32.85 KG/M2

## 2025-02-17 DIAGNOSIS — I82.452 ACUTE DEEP VEIN THROMBOSIS (DVT) OF LEFT PERONEAL VEIN (H): ICD-10-CM

## 2025-02-17 DIAGNOSIS — M79.669 CALF PAIN: ICD-10-CM

## 2025-02-17 DIAGNOSIS — R60.9 SWELLING: ICD-10-CM

## 2025-02-17 LAB
ANION GAP SERPL CALCULATED.3IONS-SCNC: 10 MMOL/L (ref 7–15)
ATRIAL RATE - MUSE: 83 BPM
BASOPHILS # BLD AUTO: 0 10E3/UL (ref 0–0.2)
BASOPHILS NFR BLD AUTO: 0 %
BUN SERPL-MCNC: 10.1 MG/DL (ref 8–23)
CALCIUM SERPL-MCNC: 9.4 MG/DL (ref 8.8–10.4)
CHLORIDE SERPL-SCNC: 103 MMOL/L (ref 98–107)
CREAT SERPL-MCNC: 0.62 MG/DL (ref 0.51–0.95)
DIASTOLIC BLOOD PRESSURE - MUSE: NORMAL MMHG
EGFRCR SERPLBLD CKD-EPI 2021: >90 ML/MIN/1.73M2
EOSINOPHIL # BLD AUTO: 0.1 10E3/UL (ref 0–0.7)
EOSINOPHIL NFR BLD AUTO: 1 %
ERYTHROCYTE [DISTWIDTH] IN BLOOD BY AUTOMATED COUNT: 15.8 % (ref 10–15)
GLUCOSE SERPL-MCNC: 115 MG/DL (ref 70–99)
HCO3 SERPL-SCNC: 27 MMOL/L (ref 22–29)
HCT VFR BLD AUTO: 30.2 % (ref 35–47)
HGB BLD-MCNC: 9.8 G/DL (ref 11.7–15.7)
HOLD SPECIMEN: NORMAL
IMM GRANULOCYTES # BLD: 0 10E3/UL
IMM GRANULOCYTES NFR BLD: 0 %
INTERPRETATION ECG - MUSE: NORMAL
LYMPHOCYTES # BLD AUTO: 2 10E3/UL (ref 0.8–5.3)
LYMPHOCYTES NFR BLD AUTO: 22 %
MCH RBC QN AUTO: 26.7 PG (ref 26.5–33)
MCHC RBC AUTO-ENTMCNC: 32.5 G/DL (ref 31.5–36.5)
MCV RBC AUTO: 82 FL (ref 78–100)
MONOCYTES # BLD AUTO: 0.4 10E3/UL (ref 0–1.3)
MONOCYTES NFR BLD AUTO: 4 %
NEUTROPHILS # BLD AUTO: 6.6 10E3/UL (ref 1.6–8.3)
NEUTROPHILS NFR BLD AUTO: 73 %
NRBC # BLD AUTO: 0 10E3/UL
NRBC BLD AUTO-RTO: 0 /100
P AXIS - MUSE: 25 DEGREES
PLATELET # BLD AUTO: 521 10E3/UL (ref 150–450)
POTASSIUM SERPL-SCNC: 3.8 MMOL/L (ref 3.4–5.3)
PR INTERVAL - MUSE: 150 MS
QRS DURATION - MUSE: 76 MS
QT - MUSE: 378 MS
QTC - MUSE: 444 MS
R AXIS - MUSE: 2 DEGREES
RBC # BLD AUTO: 3.67 10E6/UL (ref 3.8–5.2)
SODIUM SERPL-SCNC: 140 MMOL/L (ref 135–145)
SYSTOLIC BLOOD PRESSURE - MUSE: NORMAL MMHG
T AXIS - MUSE: 28 DEGREES
TROPONIN T SERPL HS-MCNC: 6 NG/L
VENTRICULAR RATE- MUSE: 83 BPM
WBC # BLD AUTO: 9.1 10E3/UL (ref 4–11)

## 2025-02-17 PROCEDURE — 93005 ELECTROCARDIOGRAM TRACING: CPT

## 2025-02-17 PROCEDURE — 93971 EXTREMITY STUDY: CPT | Mod: LT

## 2025-02-17 PROCEDURE — 99285 EMERGENCY DEPT VISIT HI MDM: CPT | Mod: 25

## 2025-02-17 PROCEDURE — 84484 ASSAY OF TROPONIN QUANT: CPT | Performed by: PHYSICIAN ASSISTANT

## 2025-02-17 PROCEDURE — 80048 BASIC METABOLIC PNL TOTAL CA: CPT | Performed by: PHYSICIAN ASSISTANT

## 2025-02-17 PROCEDURE — 85004 AUTOMATED DIFF WBC COUNT: CPT | Performed by: PHYSICIAN ASSISTANT

## 2025-02-17 PROCEDURE — 71046 X-RAY EXAM CHEST 2 VIEWS: CPT

## 2025-02-17 PROCEDURE — 85048 AUTOMATED LEUKOCYTE COUNT: CPT | Performed by: PHYSICIAN ASSISTANT

## 2025-02-17 PROCEDURE — 82565 ASSAY OF CREATININE: CPT | Performed by: PHYSICIAN ASSISTANT

## 2025-02-17 PROCEDURE — 99284 EMERGENCY DEPT VISIT MOD MDM: CPT | Performed by: PHYSICIAN ASSISTANT

## 2025-02-17 PROCEDURE — 93010 ELECTROCARDIOGRAM REPORT: CPT | Performed by: INTERNAL MEDICINE

## 2025-02-17 PROCEDURE — 36415 COLL VENOUS BLD VENIPUNCTURE: CPT | Performed by: PHYSICIAN ASSISTANT

## 2025-02-17 RX ORDER — ASPIRIN 81 MG/1
81 TABLET, CHEWABLE ORAL DAILY
COMMUNITY

## 2025-02-17 RX ORDER — CELECOXIB 200 MG/1
200 CAPSULE ORAL
COMMUNITY
Start: 2024-03-11

## 2025-02-17 ASSESSMENT — ENCOUNTER SYMPTOMS
SHORTNESS OF BREATH: 0
FATIGUE: 0
BACK PAIN: 0
COUGH: 0
BRUISES/BLEEDS EASILY: 0
FEVER: 0
APPETITE CHANGE: 0
ACTIVITY CHANGE: 0

## 2025-02-17 ASSESSMENT — COLUMBIA-SUICIDE SEVERITY RATING SCALE - C-SSRS
1. IN THE PAST MONTH, HAVE YOU WISHED YOU WERE DEAD OR WISHED YOU COULD GO TO SLEEP AND NOT WAKE UP?: NO
6. HAVE YOU EVER DONE ANYTHING, STARTED TO DO ANYTHING, OR PREPARED TO DO ANYTHING TO END YOUR LIFE?: NO
2. HAVE YOU ACTUALLY HAD ANY THOUGHTS OF KILLING YOURSELF IN THE PAST MONTH?: NO

## 2025-02-17 ASSESSMENT — ACTIVITIES OF DAILY LIVING (ADL): ADLS_ACUITY_SCORE: 41

## 2025-02-17 NOTE — TELEPHONE ENCOUNTER
Symptom or reason needing to speak to RN: blot clot in lower left leg     Best number to return call: [phone]     Best time to return call: anytime

## 2025-02-17 NOTE — ED PROVIDER NOTES
"  History     Chief Complaint   Patient presents with    Coagulation Disorder    Chest Pain     The history is provided by the patient.     Estella ORO Aas is a 64 year old female who presented to the emergency department for evaluation of a possible blood clot in her leg.  Underwent orthopedic surgery in the form of left total knee here at [facility] on February 3.  Has been having some mild calf pain and swelling.  Underwent ultrasound of the left lower extremity and found to have only a partially occlusive left peroneal vein thrombus with unremarkable proximal veins.  In questioning she does endorse some intermittent chest heaviness mostly in the supine position.  Denies symptoms at this time.  No dyspnea.  No hemoptysis.    Allergies:  Allergies   Allergen Reactions    Cats      \"Cat/feline product derivatives\"       Problem List:    Patient Active Problem List    Diagnosis Date Noted    S/P total knee arthroplasty, right 02/03/2025     Priority: Medium    S/P total knee arthroplasty, left 02/03/2025     Priority: Medium    Arthritis of left knee 11/11/2022     Priority: Medium    Fall, subsequent encounter 10/19/2020     Priority: Medium    Closed head injury, subsequent encounter 10/19/2020     Priority: Medium    Significant closed head trauma within past 3 months 09/21/2020     Priority: Medium    Acute recurrent maxillary sinusitis 09/21/2020     Priority: Medium    Special screening for malignant neoplasms, colon 02/26/2020     Priority: Medium     Added automatically from request for surgery [id number]      RADHA (generalized anxiety disorder) 08/18/2019     Priority: Medium    Chronic idiopathic urticaria 08/18/2019     Priority: Medium        Past Medical History:    Past Medical History:   Diagnosis Date    Arthritis 2020    Depressive disorder     Depressive disorder, not elsewhere classified 07/23/2008    Dysthymic disorder 08/20/2008       Past Surgical History:    Past Surgical History:   Procedure " Laterality Date    ABDOMEN SURGERY  ?    ARTHROPLASTY KNEE Left 2/3/2025    Procedure: Left total knee arthroplasty;  Surgeon: Grady Lima MD;  Location: [facility] OR    AS TOTAL HIP ARTHROPLASTY Left 2017    basilar joint injection, left thumb  2017    COLONOSCOPY  2006    COLONOSCOPY N/A 2020    Procedure: COLONOSCOPY;  Surgeon: Bradly Jauregui MD;  Location: [facility] OR    hand      RT    HYSTERECTOMY TOTAL ABDOMINAL N/A     Cervix removed. Ovaries remain.     ZZC TOTAL HIP ARTHROPLASTY Left 2017       Family History:    Family History   Problem Relation Age of Onset    Hypertension Mother     Other - See Comments Father         Alzheimer's Disease    C.A.D. Father     Cancer Brother     Hypertension Son     Mental Illness Son     Prostate Cancer Brother             Other Cancer Brother             Depression Sister        Social History:  Marital Status:  Single [1]  Social History     Tobacco Use    Smoking status: Every Day    Smokeless tobacco: Current    Tobacco comments:     Quit ; no passive exposure. Vapes daily.   Vaping Use    Vaping status: Every Day    Substances: Nicotine    Devices: Disposable   Substance Use Topics    Alcohol use: Yes     Comment: Socially    Drug use: No        Medications:    aspirin (ASA) 81 MG chewable tablet  celecoxib (CELEBREX) 200 MG capsule  escitalopram (LEXAPRO) 20 MG tablet  Rivaroxaban ANTICOAGULANT 15 & 20 MG TBPK Starter Therapy Pack          Review of Systems   Constitutional:  Negative for activity change, appetite change, fatigue and fever.   Respiratory:  Negative for cough and shortness of breath.    Cardiovascular:         See HPI   Musculoskeletal:  Negative for back pain.        See HPI   Hematological:  Does not bruise/bleed easily.       Physical Exam   BP: (!) 143/81  Pulse: 92  Temp: 98.6  F (37  C)  Resp: 16  Weight: 80.2 kg (176 lb 11.2 oz)  SpO2: 95 %      Physical Exam  Vitals and nursing note  reviewed.   Constitutional:       General: She is not in acute distress.     Appearance: Normal appearance. She is normal weight. She is not ill-appearing, toxic-appearing or diaphoretic.      Comments: Pleasant and talkative 64-year-old female found semireclined in no distress   Pulmonary:      Effort: Pulmonary effort is normal.   Musculoskeletal:      Comments: Mild left lower extremity swelling.  Normal range of motion   Skin:     General: Skin is warm and dry.      Capillary Refill: Capillary refill takes less than 2 seconds.   Neurological:      General: No focal deficit present.      Mental Status: She is alert and oriented to person, place, and time.         ED Course     ED Course as of 02/17/25 1353   Mon Feb 17, 2025   1302 My independent review of the EKG shows a normal sinus rhythm at a rate of 83.  Normal AR interval.  Normal QRS duration.  Normal QTc.  Normal axis.  There are no concerning ST segments.  There are no concerning T waves.  There is no evidence of ectopy, preexcitation, or ischemia.   1324 Start xarelto per Patrica   1331 My independent review of the chest x-ray shows no evidence of focal consolidation, pneumothorax, or widened mediastinum.   1349 Troponin T, High Sensitivity: 6     Procedures              Critical Care time:  none     None         Results for orders placed or performed during the hospital encounter of 02/17/25 (from the past 24 hours)   EKG 12-lead, tracing only   Result Value Ref Range    Systolic Blood Pressure  mmHg    Diastolic Blood Pressure  mmHg    Ventricular Rate 83 BPM    Atrial Rate 83 BPM    AR Interval 150 ms    QRS Duration 76 ms     ms    QTc 444 ms    P Axis 25 degrees    R AXIS 2 degrees    T Axis 28 degrees    Interpretation ECG       Sinus rhythm  Normal ECG  When compared with ECG of 15-Klever-2025 09:20,  No significant change was found     CBC with platelets differential    Narrative    The following orders were created for panel order CBC with  platelets differential.  Procedure                               Abnormality         Status                     ---------                               -----------         ------                     CBC with platelets and d...[[id number]]  Abnormal            Final result                 Please view results for these tests on the individual orders.   Basic metabolic panel   Result Value Ref Range    Sodium 140 135 - 145 mmol/L    Potassium 3.8 3.4 - 5.3 mmol/L    Chloride 103 98 - 107 mmol/L    Carbon Dioxide (CO2) 27 22 - 29 mmol/L    Anion Gap 10 7 - 15 mmol/L    Urea Nitrogen 10.1 8.0 - 23.0 mg/dL    Creatinine 0.62 0.51 - 0.95 mg/dL    GFR Estimate >90 >60 mL/min/1.73m2    Calcium 9.4 8.8 - 10.4 mg/dL    Glucose 115 (H) 70 - 99 mg/dL   Troponin T, High Sensitivity   Result Value Ref Range    Troponin T, High Sensitivity 6 <=14 ng/L   CBC with platelets and differential   Result Value Ref Range    WBC Count 9.1 4.0 - 11.0 10e3/uL    RBC Count 3.67 (L) 3.80 - 5.20 10e6/uL    Hemoglobin 9.8 (L) 11.7 - 15.7 g/dL    Hematocrit 30.2 (L) 35.0 - 47.0 %    MCV 82 78 - 100 fL    MCH 26.7 26.5 - 33.0 pg    MCHC 32.5 31.5 - 36.5 g/dL    RDW 15.8 (H) 10.0 - 15.0 %    Platelet Count 521 (H) 150 - 450 10e3/uL    % Neutrophils 73 %    % Lymphocytes 22 %    % Monocytes 4 %    % Eosinophils 1 %    % Basophils 0 %    % Immature Granulocytes 0 %    NRBCs per 100 WBC 0 <1 /100    Absolute Neutrophils 6.6 1.6 - 8.3 10e3/uL    Absolute Lymphocytes 2.0 0.8 - 5.3 10e3/uL    Absolute Monocytes 0.4 0.0 - 1.3 10e3/uL    Absolute Eosinophils 0.1 0.0 - 0.7 10e3/uL    Absolute Basophils 0.0 0.0 - 0.2 10e3/uL    Absolute Immature Granulocytes 0.0 <=0.4 10e3/uL    Absolute NRBCs 0.0 10e3/uL   Extra Tube    Narrative    The following orders were created for panel order Extra Tube.  Procedure                               Abnormality         Status                     ---------                               -----------         ------                      Extra Blue Top Tube[[id number]]                              In process                 Extra Red Top Tube[[id number]]                               In process                 Extra Green Top (Lithium...[[id number]]                      In process                   Please view results for these tests on the individual orders.   XR Chest 2 Views    Narrative    Procedure:XR CHEST 2 VIEWS    Clinical history:Female, 64 years, Chest pain    Technique: Two views are submitted.    Comparison: 8/25/2022    Findings: The cardiac silhouette is within normal limits.    The lungs are clear. Bony structures are unremarkable.      Impression    Impression:   No acute abnormality. No evidence of acute or active cardiopulmonary  disease.    LETICIA NOLAN MD         SYSTEM ID:  [id number]       Medications - No data to display    Assessments & Plan (with Medical Decision Making)   64-year-old female who presents to the emergency department for evaluation of a partial occlusive left lower extremity distal DVT in the peroneal vein.  No history of bleeding diatheses.  She has no evidence of tachycardia, hypotension, hypoxia, or other concerning features such as dyspnea at rest or with exertion.  There is no proximal DVT.  I doubt she has a clinically significant pulmonary embolism that would require transfer to a tertiary center or thrombectomy.  No reasonable indication for cross-sectional imaging of the chest at this time.  Based on the patient's vague history of possible mild chest pressures in the supine position I did elect to extend the workup to chest x-ray, troponin, and EKG.  These were all unremarkable.  Her EKG is normal.  Her troponin is 6.  No reasonable indication for delta.  Seen in the emergency department by her primary provider as well and will follow-up in the clinic.  We will start Xarelto at the usual schedule.  Strict return precautions were provided.  Orthopedic follow-up was also discussed.    This  document was prepared using a combination of typing and voice generated software.  While every attempt was made for accuracy, spelling and grammatical errors may exist.     I have reviewed the nursing notes.    I have reviewed the findings, diagnosis, plan and need for follow up with the patient.          Medical Decision Making  The patient's presentation was of moderate complexity (DVT).    The patient's evaluation involved:  ordering and/or review of 3+ test(s) in this encounter (labs and review of ultrasound)    The patient's management necessitated moderate risk (prescription drug management including medications given in the ED).        New Prescriptions    RIVAROXABAN ANTICOAGULANT 15 & 20 MG TBPK STARTER THERAPY PACK    Take 15 mg by mouth 2 times daily (with meals) for 21 days, THEN 20 mg daily with food for 9 days. Continue as directed by provider.       Final diagnoses:   Acute deep vein thrombosis (DVT) of left peroneal vein (H)       2/17/2025   [facility]       Barbara Pizarro PA-C  02/17/25 0819

## 2025-02-17 NOTE — ED TRIAGE NOTES
Pt presents with c/o having a blood clot in her left calf and is also having some chest pain that has been present for a couple of days but is worse today.

## 2025-02-17 NOTE — DISCHARGE INSTRUCTIONS
Start Xarelto as prescribed.    Follow-up in the clinic as scheduled.    Return here for any other questions or concerns.

## 2025-02-28 NOTE — PROGRESS NOTES
"  Assessment & Plan     Acute deep vein thrombosis (DVT) of left lower extremity, unspecified vein (H)  We will get an ultrasound in one month . If gone can stop Xarelto.  Was only partially occlusive. Pain is all better. Told her a minimum of 6 weeks on this drug is needed.  She is upset at cost which I do not blame her.   Will be seeing Dr. Tsang on the first.  So quite soon after should have her ultrasound.   She is otherwise doing well in therapy. Flexion to 119 degrees.  Which is great.  Gained weight discussion on this.   - rivaroxaban ANTICOAGULANT (XARELTO) 20 MG TABS tablet; Take 1 tablet (20 mg) by mouth daily (with dinner).  - US Lower Extremity Venous Duplex Left; Future        MED REC REQUIREDPost Medication Reconciliation Status:     BMI  Estimated body mass index is 33.31 kg/m  as calculated from the following:    Height as of 2/3/25: 1.562 m (5' 1.5\").    Weight as of this encounter: 81.3 kg (179 lb 3.2 oz).   Weight management plan: Discussed healthy diet and exercise guidelines      See Patient Instructions    No follow-ups on file.    Nisha Tineo is a 64 year old, presenting for the following health issues:  Surgical Followup and ER F/U        3/3/2025     8:01 AM   Additional Questions   Roomed by Jori Alvarez lpn   Accompanied by self     History of Present Illness       Reason for visit:  Follow up knee replace and blood clot    She eats 2-3 servings of fruits and vegetables daily.She consumes 0 sweetened beverage(s) daily.She exercises with enough effort to increase her heart rate 20 to 29 minutes per day.  She exercises with enough effort to increase her heart rate 4 days per week.   She is taking medications regularly.        ED/UC Followup:    Facility:  [facility] [place]  Date of visit: 2/17  Reason for visit: DVT  Current Status: feels a lot better, not having the pain.         Review of Systems  Constitutional, HEENT, cardiovascular, pulmonary, gi and gu systems are negative, except as " otherwise noted.      Objective    /80 (BP Location: Left arm, Patient Position: Sitting, Cuff Size: Adult Regular)   Pulse 84   Temp 97.8  F (36.6  C) (Tympanic)   Resp 16   Wt 81.3 kg (179 lb 3.2 oz)   SpO2 98%   BMI 33.31 kg/m    Body mass index is 33.31 kg/m .  Physical Exam   GENERAL: alert and no distress  EYES: Eyes grossly normal to inspection, PERRL and conjunctivae and sclerae normal  HENT: ear canals and TM's normal, nose and mouth without ulcers or lesions  NECK: no adenopathy, no asymmetry, masses, or scars  RESP: lungs clear to auscultation - no rales, rhonchi or wheezes  CV: regular rate and rhythm, normal S1 S2, no S3 or S4, no murmur, click or rub, no peripheral edema  ABDOMEN: soft, nontender, no hepatosplenomegaly, no masses and bowel sounds normal  MS: no gross musculoskeletal defects noted, no edema  SKIN: no suspicious lesions or rashes  PSYCH: mentation appears normal, affect normal/bright    Admission on 02/17/2025, Discharged on 02/17/2025   Component Date Value Ref Range Status    Ventricular Rate 02/17/2025 83  BPM Final    Atrial Rate 02/17/2025 83  BPM Final    MI Interval 02/17/2025 150  ms Final    QRS Duration 02/17/2025 76  ms Final    QT 02/17/2025 378  ms Final    QTc 02/17/2025 444  ms Final    P Axis 02/17/2025 25  degrees Final    R AXIS 02/17/2025 2  degrees Final    T Axis 02/17/2025 28  degrees Final    Interpretation ECG 02/17/2025    Final                    Value:Sinus rhythm  Normal ECG  When compared with ECG of 15-Klever-2025 09:20,  No significant change was found  Confirmed by MD Erin, Axel ([id number]) on 2/17/2025 3:35:57 PM      Sodium 02/17/2025 140  135 - 145 mmol/L Final    Potassium 02/17/2025 3.8  3.4 - 5.3 mmol/L Final    Chloride 02/17/2025 103  98 - 107 mmol/L Final    Carbon Dioxide (CO2) 02/17/2025 27  22 - 29 mmol/L Final    Anion Gap 02/17/2025 10  7 - 15 mmol/L Final    Urea Nitrogen 02/17/2025 10.1  8.0 - 23.0 mg/dL Final    Creatinine  02/17/2025 0.62  0.51 - 0.95 mg/dL Final    GFR Estimate 02/17/2025 >90  >60 mL/min/1.73m2 Final    eGFR calculated using 2021 CKD-EPI equation.    Calcium 02/17/2025 9.4  8.8 - 10.4 mg/dL Final    Glucose 02/17/2025 115 (H)  70 - 99 mg/dL Final    Troponin T, High Sensitivity 02/17/2025 6  <=14 ng/L Final    Either a High Sensitivity Troponin T baseline (0 hours) value = 100 ng/L, or an increase in High Sensitivity Troponin T = 7 ng/L at 2 hours compared to 0 hours (2-0 hours), suggests myocardial injury, and urgent clinical attention is required.    If the 2-0 hours increase is <7 ng/L, a High Sensitivity Troponin T result above gender-specific reference ranges warrants further evaluation.   Recommendations for further evaluation include correlation with clinical decision-making tool (e.g., HEART), a 3rd High Sensitivity Troponin T test 2 hours after the 2nd (a 20% change from baseline would represent concern), admission for observation, close PCC/cardiology follow-up, or urgent outpatient provocative testing.    WBC Count 02/17/2025 9.1  4.0 - 11.0 10e3/uL Final    RBC Count 02/17/2025 3.67 (L)  3.80 - 5.20 10e6/uL Final    Hemoglobin 02/17/2025 9.8 (L)  11.7 - 15.7 g/dL Final    Hematocrit 02/17/2025 30.2 (L)  35.0 - 47.0 % Final    MCV 02/17/2025 82  78 - 100 fL Final    MCH 02/17/2025 26.7  26.5 - 33.0 pg Final    MCHC 02/17/2025 32.5  31.5 - 36.5 g/dL Final    RDW 02/17/2025 15.8 (H)  10.0 - 15.0 % Final    Platelet Count 02/17/2025 521 (H)  150 - 450 10e3/uL Final    % Neutrophils 02/17/2025 73  % Final    % Lymphocytes 02/17/2025 22  % Final    % Monocytes 02/17/2025 4  % Final    % Eosinophils 02/17/2025 1  % Final    % Basophils 02/17/2025 0  % Final    % Immature Granulocytes 02/17/2025 0  % Final    NRBCs per 100 WBC 02/17/2025 0  <1 /100 Final    Absolute Neutrophils 02/17/2025 6.6  1.6 - 8.3 10e3/uL Final    Absolute Lymphocytes 02/17/2025 2.0  0.8 - 5.3 10e3/uL Final    Absolute Monocytes 02/17/2025  0.4  0.0 - 1.3 10e3/uL Final    Absolute Eosinophils 02/17/2025 0.1  0.0 - 0.7 10e3/uL Final    Absolute Basophils 02/17/2025 0.0  0.0 - 0.2 10e3/uL Final    Absolute Immature Granulocytes 02/17/2025 0.0  <=0.4 10e3/uL Final    Absolute NRBCs 02/17/2025 0.0  10e3/uL Final    Hold Specimen 02/17/2025 [facility]   Final    Hold Specimen 02/17/2025 [facility]   Final    Hold Specimen 02/17/2025 [facility]   Final           Signed Electronically by: Mia Villagomez PA

## 2025-03-03 ENCOUNTER — OFFICE VISIT (OUTPATIENT)
Dept: FAMILY MEDICINE | Facility: OTHER | Age: 65
End: 2025-03-03
Attending: PHYSICIAN ASSISTANT
Payer: COMMERCIAL

## 2025-03-03 VITALS
RESPIRATION RATE: 16 BRPM | BODY MASS INDEX: 33.31 KG/M2 | TEMPERATURE: 97.8 F | SYSTOLIC BLOOD PRESSURE: 100 MMHG | DIASTOLIC BLOOD PRESSURE: 80 MMHG | WEIGHT: 179.2 LBS | OXYGEN SATURATION: 98 % | HEART RATE: 84 BPM

## 2025-03-03 DIAGNOSIS — I82.402 ACUTE DEEP VEIN THROMBOSIS (DVT) OF LEFT LOWER EXTREMITY, UNSPECIFIED VEIN (H): Primary | ICD-10-CM

## 2025-03-03 LAB
BASOPHILS # BLD AUTO: 0 10E3/UL (ref 0–0.2)
BASOPHILS NFR BLD AUTO: 1 %
EOSINOPHIL # BLD AUTO: 0.1 10E3/UL (ref 0–0.7)
EOSINOPHIL NFR BLD AUTO: 1 %
ERYTHROCYTE [DISTWIDTH] IN BLOOD BY AUTOMATED COUNT: 14.6 % (ref 10–15)
HCT VFR BLD AUTO: 33.8 % (ref 35–47)
HGB BLD-MCNC: 10.8 G/DL (ref 11.7–15.7)
IMM GRANULOCYTES # BLD: 0 10E3/UL
IMM GRANULOCYTES NFR BLD: 0 %
LYMPHOCYTES # BLD AUTO: 2.3 10E3/UL (ref 0.8–5.3)
LYMPHOCYTES NFR BLD AUTO: 37 %
MCH RBC QN AUTO: 27.1 PG (ref 26.5–33)
MCHC RBC AUTO-ENTMCNC: 32 G/DL (ref 31.5–36.5)
MCV RBC AUTO: 85 FL (ref 78–100)
MONOCYTES # BLD AUTO: 0.4 10E3/UL (ref 0–1.3)
MONOCYTES NFR BLD AUTO: 6 %
NEUTROPHILS # BLD AUTO: 3.5 10E3/UL (ref 1.6–8.3)
NEUTROPHILS NFR BLD AUTO: 56 %
NRBC # BLD AUTO: 0 10E3/UL
NRBC BLD AUTO-RTO: 0 /100
PLATELET # BLD AUTO: 397 10E3/UL (ref 150–450)
RBC # BLD AUTO: 3.99 10E6/UL (ref 3.8–5.2)
WBC # BLD AUTO: 6.3 10E3/UL (ref 4–11)

## 2025-03-03 PROCEDURE — G0463 HOSPITAL OUTPT CLINIC VISIT: HCPCS | Mod: 25

## 2025-03-03 PROCEDURE — 1125F AMNT PAIN NOTED PAIN PRSNT: CPT | Performed by: PHYSICIAN ASSISTANT

## 2025-03-03 PROCEDURE — 99213 OFFICE O/P EST LOW 20 MIN: CPT | Performed by: PHYSICIAN ASSISTANT

## 2025-03-03 PROCEDURE — 3079F DIAST BP 80-89 MM HG: CPT | Performed by: PHYSICIAN ASSISTANT

## 2025-03-03 PROCEDURE — G0463 HOSPITAL OUTPT CLINIC VISIT: HCPCS

## 2025-03-03 PROCEDURE — 3074F SYST BP LT 130 MM HG: CPT | Performed by: PHYSICIAN ASSISTANT

## 2025-03-03 PROCEDURE — 36415 COLL VENOUS BLD VENIPUNCTURE: CPT | Mod: ZL | Performed by: PHYSICIAN ASSISTANT

## 2025-03-03 PROCEDURE — 85025 COMPLETE CBC W/AUTO DIFF WBC: CPT | Mod: ZL | Performed by: PHYSICIAN ASSISTANT

## 2025-03-03 RX ORDER — CEFADROXIL 500 MG/1
CAPSULE ORAL
COMMUNITY
Start: 2024-03-11 | End: 2025-03-03

## 2025-03-03 RX ORDER — ACETAMINOPHEN 500 MG/1
TABLET, COATED ORAL
COMMUNITY
Start: 2024-03-11

## 2025-03-03 RX ORDER — CLONAZEPAM 0.5 MG/1
TABLET ORAL
COMMUNITY
Start: 2025-01-15

## 2025-03-03 RX ORDER — OXYCODONE HYDROCHLORIDE 5 MG/1
TABLET ORAL
COMMUNITY
Start: 2024-03-11 | End: 2025-03-03

## 2025-03-03 ASSESSMENT — PAIN SCALES - GENERAL: PAINLEVEL_OUTOF10: MILD PAIN (2)

## 2025-03-09 ENCOUNTER — HEALTH MAINTENANCE LETTER (OUTPATIENT)
Age: 65
End: 2025-03-09

## 2025-03-31 ENCOUNTER — HOSPITAL ENCOUNTER (OUTPATIENT)
Dept: ULTRASOUND IMAGING | Facility: HOSPITAL | Age: 65
Discharge: HOME | End: 2025-03-31
Attending: PHYSICIAN ASSISTANT | Admitting: PHYSICIAN ASSISTANT
Payer: COMMERCIAL

## 2025-03-31 DIAGNOSIS — I82.402 ACUTE DEEP VEIN THROMBOSIS (DVT) OF LEFT LOWER EXTREMITY, UNSPECIFIED VEIN (H): ICD-10-CM

## 2025-03-31 PROCEDURE — 93971 EXTREMITY STUDY: CPT | Mod: LT

## 2025-04-01 ENCOUNTER — OFFICE VISIT (OUTPATIENT)
Dept: FAMILY MEDICINE | Facility: OTHER | Age: 65
End: 2025-04-01
Attending: STUDENT IN AN ORGANIZED HEALTH CARE EDUCATION/TRAINING PROGRAM
Payer: COMMERCIAL

## 2025-04-01 VITALS
RESPIRATION RATE: 16 BRPM | TEMPERATURE: 97.5 F | SYSTOLIC BLOOD PRESSURE: 120 MMHG | HEIGHT: 62 IN | WEIGHT: 177 LBS | BODY MASS INDEX: 32.57 KG/M2 | DIASTOLIC BLOOD PRESSURE: 80 MMHG | HEART RATE: 75 BPM | OXYGEN SATURATION: 96 %

## 2025-04-01 DIAGNOSIS — Z96.652 HISTORY OF TOTAL KNEE ARTHROPLASTY, LEFT: ICD-10-CM

## 2025-04-01 DIAGNOSIS — Z76.89 ENCOUNTER TO ESTABLISH CARE: Primary | ICD-10-CM

## 2025-04-01 DIAGNOSIS — I82.402 ACUTE DEEP VEIN THROMBOSIS (DVT) OF LEFT LOWER EXTREMITY, UNSPECIFIED VEIN (H): ICD-10-CM

## 2025-04-01 PROCEDURE — G0463 HOSPITAL OUTPT CLINIC VISIT: HCPCS

## 2025-04-01 ASSESSMENT — PAIN SCALES - GENERAL: PAINLEVEL_OUTOF10: NO PAIN (0)

## 2025-04-01 NOTE — PROGRESS NOTES
Assessment & Plan     Encounter to establish care  Noman presents to clinic to establish care.  Previously under care of Mia Villagomez.    History of total knee arthroplasty, left  S/P replacement on 2/3, doing well.  Recently graduated from PT she tells me.      Acute deep vein thrombosis (DVT) of left lower extremity, unspecified vein (H)  Provoked 2/2 recent L TKA.  No history of DVT or PE prior to this which is reassuring  Did have repeat US of LE and it was negative.  No clots.  The previous peroneal DVT has cleared  I do want her to complete 3 month course of xarelto  Will provide coupon to patient so she can get this for free for another month.  She is to let us know when she is running low (1 week left) so we can try to get additional 2 weeks to complete the 3 month course.  No clinical indication for long term anti-coagulation.    - rivaroxaban ANTICOAGULANT (XARELTO) 20 MG TABS tablet; Take 1 tablet (20 mg) by mouth daily (with dinner).    No follow-ups on file.    Subjective   Noman is a 64 year old, presenting for the following health issues:  Establish Care and Results (ultrasound)        4/1/2025     8:16 AM   Additional Questions   Roomed by Jasmin mcbride   Accompanied by Self         4/1/2025     8:16 AM   Patient Reported Additional Medications   Patient reports taking the following new medications None     History of Present Illness       Reason for visit:  Results of ultra sound  for blood clot and low hemoglobin    She eats 2-3 servings of fruits and vegetables daily.She consumes 0 sweetened beverage(s) daily.She exercises with enough effort to increase her heart rate 30 to 60 minutes per day.  She exercises with enough effort to increase her heart rate 4 days per week.   She is taking medications regularly.        600 dollars for 30 days.  On xarelto.  Gets this at [facility] at the [facility].  Takes 20 mg once a day.  Does not have medicare part D    Concern - US results and review  "Labs    Description: Ultrasound ordered due to Hx of DVT after surgery (L knee replacement). Labs showed Low Hemoglobin, patient takes Flinstone vitamin with iron  Accompanying Signs & Symptoms: None  Previous history of similar problem: No  Precipitating factors:        Worsened by: n/a  Alleviating factors:        Improved by: Rx: Xarelto and Flinstone vitamin  Therapies tried and outcome: see above. Pt tolerating medication well, no concerns or side effects        Review of Systems  Constitutional, HEENT, cardiovascular, pulmonary, GI, , musculoskeletal, neuro, skin, endocrine and psych systems are negative, except as otherwise noted.      Objective    /80 (BP Location: Left arm, Patient Position: Sitting, Cuff Size: Adult Regular)   Pulse 75   Temp 97.5  F (36.4  C) (Tympanic)   Resp 16   Ht 1.562 m (5' 1.5\")   Wt 80.3 kg (177 lb)   SpO2 96%   BMI 32.90 kg/m    Body mass index is 32.9 kg/m .  Physical Exam   GENERAL: alert and no distress  EYES: Eyes grossly normal to inspection, PERRL and conjunctivae and sclerae normal  RESP: lungs clear to auscultation - no rales, rhonchi or wheezes  CV: regular rate and rhythm, normal S1 S2, no S3 or S4, no murmur, click or rub, no peripheral edema  MS: no gross musculoskeletal defects noted, no edema  SKIN: no suspicious lesions or rashes  NEURO: Normal strength and tone, mentation intact and speech normal  PSYCH: mentation appears normal, affect normal/bright          Signed Electronically by: Lakeisha Tsang MD    "

## 2025-04-04 NOTE — RESULT ENCOUNTER NOTE
It is all gone. You stay on this until you move well. !  But knowing you , you will not do what I say and quit this now as the clot is gone.

## 2025-04-21 DIAGNOSIS — F41.1 GAD (GENERALIZED ANXIETY DISORDER): Primary | ICD-10-CM

## 2025-04-21 NOTE — TELEPHONE ENCOUNTER
Klonopin       Last Written Prescription Date:  1/15/2025 historical  Last Fill Quantity: unknown,   # refills: unknown  Last Office Visit: 4/1/2025  Future Office visit:

## 2025-04-22 RX ORDER — CLONAZEPAM 0.5 MG/1
0.5 TABLET ORAL 2 TIMES DAILY
Qty: 60 TABLET | Refills: 0 | Status: SHIPPED | OUTPATIENT
Start: 2025-04-22

## 2025-04-22 NOTE — TELEPHONE ENCOUNTER
Rx Protocol Controlled Substance [id number]/21/2025 09:50 AM   Protocol Details Medication is active on med list and the sig matches    Urine drug screeen results on file in past 12 months    Controlled Substance Agreement on file in last 12 months    Auto Fail - Please forward to Provider

## 2025-07-07 DIAGNOSIS — F41.1 GAD (GENERALIZED ANXIETY DISORDER): ICD-10-CM

## 2025-07-07 RX ORDER — CLONAZEPAM 0.5 MG/1
0.5 TABLET ORAL 2 TIMES DAILY
Qty: 60 TABLET | Refills: 0 | Status: SHIPPED | OUTPATIENT
Start: 2025-07-07

## 2025-07-07 NOTE — TELEPHONE ENCOUNTER
Clonazepam 0.5mg  Last Written Prescription Date:  4/22/2025  Last Fill Quantity: 60,   # refills: 0  Last Office Visit: 4/1/2025  Future Office visit:       Routing refill request to provider for review/approval because:

## 2025-08-04 DIAGNOSIS — F41.1 GAD (GENERALIZED ANXIETY DISORDER): ICD-10-CM

## 2025-08-04 DIAGNOSIS — L50.1 CHRONIC IDIOPATHIC URTICARIA: ICD-10-CM

## 2025-08-06 RX ORDER — ESCITALOPRAM OXALATE 20 MG/1
20 TABLET ORAL DAILY
Qty: 30 TABLET | Refills: 0 | Status: SHIPPED | OUTPATIENT
Start: 2025-08-06 | End: 2025-08-07

## 2025-08-07 ENCOUNTER — OFFICE VISIT (OUTPATIENT)
Dept: FAMILY MEDICINE | Facility: OTHER | Age: 65
End: 2025-08-07
Attending: NURSE PRACTITIONER
Payer: COMMERCIAL

## 2025-08-07 VITALS
WEIGHT: 167 LBS | BODY MASS INDEX: 30.73 KG/M2 | DIASTOLIC BLOOD PRESSURE: 80 MMHG | SYSTOLIC BLOOD PRESSURE: 117 MMHG | OXYGEN SATURATION: 98 % | TEMPERATURE: 97.4 F | RESPIRATION RATE: 16 BRPM | HEIGHT: 62 IN | HEART RATE: 62 BPM

## 2025-08-07 DIAGNOSIS — Z23 NEED FOR VACCINATION: ICD-10-CM

## 2025-08-07 DIAGNOSIS — F41.1 GAD (GENERALIZED ANXIETY DISORDER): ICD-10-CM

## 2025-08-07 DIAGNOSIS — N30.01 ACUTE CYSTITIS WITH HEMATURIA: Primary | ICD-10-CM

## 2025-08-07 DIAGNOSIS — R30.0 DYSURIA: ICD-10-CM

## 2025-08-07 DIAGNOSIS — L50.1 CHRONIC IDIOPATHIC URTICARIA: ICD-10-CM

## 2025-08-07 LAB
ALBUMIN UR-MCNC: 100 MG/DL
APPEARANCE UR: ABNORMAL
BACTERIA #/AREA URNS HPF: ABNORMAL /HPF
BILIRUB UR QL STRIP: NEGATIVE
COLOR UR AUTO: YELLOW
GLUCOSE UR STRIP-MCNC: NEGATIVE MG/DL
HGB UR QL STRIP: ABNORMAL
KETONES UR STRIP-MCNC: NEGATIVE MG/DL
LEUKOCYTE ESTERASE UR QL STRIP: ABNORMAL
NITRATE UR QL: NEGATIVE
PH UR STRIP: 7.5 [PH] (ref 5–7)
RBC #/AREA URNS AUTO: ABNORMAL /HPF
SP GR UR STRIP: 1.01 (ref 1–1.03)
SQUAMOUS #/AREA URNS AUTO: ABNORMAL /LPF
UROBILINOGEN UR STRIP-ACNC: 0.2 E.U./DL
WBC #/AREA URNS AUTO: >100 /HPF

## 2025-08-07 PROCEDURE — G0463 HOSPITAL OUTPT CLINIC VISIT: HCPCS

## 2025-08-07 PROCEDURE — 81001 URINALYSIS AUTO W/SCOPE: CPT | Mod: ZL | Performed by: NURSE PRACTITIONER

## 2025-08-07 PROCEDURE — 81003 URINALYSIS AUTO W/O SCOPE: CPT | Mod: ZL | Performed by: NURSE PRACTITIONER

## 2025-08-07 RX ORDER — NITROFURANTOIN 25; 75 MG/1; MG/1
100 CAPSULE ORAL 2 TIMES DAILY
Qty: 10 CAPSULE | Refills: 0 | Status: SHIPPED | OUTPATIENT
Start: 2025-08-07 | End: 2025-08-12

## 2025-08-07 RX ORDER — ESCITALOPRAM OXALATE 20 MG/1
20 TABLET ORAL DAILY
Qty: 90 TABLET | Refills: 1 | Status: SHIPPED | OUTPATIENT
Start: 2025-08-07

## 2025-08-07 ASSESSMENT — ANXIETY QUESTIONNAIRES
2. NOT BEING ABLE TO STOP OR CONTROL WORRYING: NOT AT ALL
3. WORRYING TOO MUCH ABOUT DIFFERENT THINGS: NOT AT ALL
4. TROUBLE RELAXING: NOT AT ALL
1. FEELING NERVOUS, ANXIOUS, OR ON EDGE: NOT AT ALL
GAD7 TOTAL SCORE: 0
6. BECOMING EASILY ANNOYED OR IRRITABLE: NOT AT ALL
5. BEING SO RESTLESS THAT IT IS HARD TO SIT STILL: NOT AT ALL
7. FEELING AFRAID AS IF SOMETHING AWFUL MIGHT HAPPEN: NOT AT ALL
7. FEELING AFRAID AS IF SOMETHING AWFUL MIGHT HAPPEN: NOT AT ALL
GAD7 TOTAL SCORE: 0
8. IF YOU CHECKED OFF ANY PROBLEMS, HOW DIFFICULT HAVE THESE MADE IT FOR YOU TO DO YOUR WORK, TAKE CARE OF THINGS AT HOME, OR GET ALONG WITH OTHER PEOPLE?: NOT DIFFICULT AT ALL
GAD7 TOTAL SCORE: 0
IF YOU CHECKED OFF ANY PROBLEMS ON THIS QUESTIONNAIRE, HOW DIFFICULT HAVE THESE PROBLEMS MADE IT FOR YOU TO DO YOUR WORK, TAKE CARE OF THINGS AT HOME, OR GET ALONG WITH OTHER PEOPLE: NOT DIFFICULT AT ALL

## 2025-08-07 ASSESSMENT — PATIENT HEALTH QUESTIONNAIRE - PHQ9
SUM OF ALL RESPONSES TO PHQ QUESTIONS 1-9: 6
10. IF YOU CHECKED OFF ANY PROBLEMS, HOW DIFFICULT HAVE THESE PROBLEMS MADE IT FOR YOU TO DO YOUR WORK, TAKE CARE OF THINGS AT HOME, OR GET ALONG WITH OTHER PEOPLE: SOMEWHAT DIFFICULT
SUM OF ALL RESPONSES TO PHQ QUESTIONS 1-9: 6

## 2025-08-07 ASSESSMENT — PAIN SCALES - GENERAL: PAINLEVEL_OUTOF10: MODERATE PAIN (5)

## 2025-08-09 LAB — BACTERIA UR CULT: ABNORMAL

## 2025-09-04 ENCOUNTER — MYC REFILL (OUTPATIENT)
Dept: FAMILY MEDICINE | Facility: OTHER | Age: 65
End: 2025-09-04

## 2025-09-04 DIAGNOSIS — L50.1 CHRONIC IDIOPATHIC URTICARIA: ICD-10-CM

## 2025-09-04 DIAGNOSIS — F41.1 GAD (GENERALIZED ANXIETY DISORDER): ICD-10-CM

## 2025-09-04 RX ORDER — ESCITALOPRAM OXALATE 20 MG/1
20 TABLET ORAL DAILY
Qty: 90 TABLET | Refills: 1 | Status: SHIPPED | OUTPATIENT
Start: 2025-09-04

## (undated) DEVICE — PACK KNEE CUSTOM SOP32TKMB7

## (undated) DEVICE — SU STRATAFIX PDS PLUS 2-0 SPIRAL CT-1 70CM SXPP1B412

## (undated) DEVICE — PULSE LAVAGE IRRIGATION SYSTEM 0210-114-100

## (undated) DEVICE — TUBING-SUCTION 20FT

## (undated) DEVICE — GLOVE PROTEXIS POWDER FREE 8.0 ORTHOPEDIC 2D73ET80

## (undated) DEVICE — SOLUTION IV IRRIGATION 0.9% NACL 3L R8206

## (undated) DEVICE — PREP CHLORAPREP 26ML TINTED HI-LITE ORANGE 930815

## (undated) DEVICE — SUCTION MANIFOLD NEPTUNE 2 SYS 4 PORT 0702-020-000

## (undated) DEVICE — CANISTER-SUCTION 2000CC

## (undated) DEVICE — TOURNIQUET SGL  BLADDER 30"X4" BLUE 5921030135

## (undated) DEVICE — BLADE SAGITTAL DUAL CUT 25MM X 100MM X 1.27MM BR4125-127-100

## (undated) DEVICE — COVER LT HANDLE 2/PK 5160-2FG

## (undated) DEVICE — SU MONOCRYL 3-0 PS-1 27" Y936H

## (undated) DEVICE — CLEANSER JET LAVAGE IRRISEPT 0.05% CHG IRRISEPT45USA

## (undated) DEVICE — LABEL STERILE PREPRINTED FOR OR FRRH01-2M

## (undated) DEVICE — SU VICRYL 1 CT-1 CR 8X18" J741D

## (undated) DEVICE — SU DERMABOND ADVANCED .7ML DNX12

## (undated) DEVICE — CONNECTOR-ERBEFLO 2 PORT

## (undated) DEVICE — SOLUTION IV WATER HYPOTONIC 1000ML L8500

## (undated) DEVICE — SOL NACL 0.9% IRRIG 1000ML BOTTLE 2F7124

## (undated) DEVICE — IRRIGATION-H2O 1000ML

## (undated) DEVICE — ESU GROUND PAD ADULT W/CORD E7507

## (undated) DEVICE — BIN-MINI, MAXI, MICRO DRIVER BLADES BIN

## (undated) DEVICE — PAD POSITIONING KNEE SELF ADHESIVE MPR100211

## (undated) DEVICE — BONE CEMENT MIXEVAC III HI VAC KIT  0206-015-000

## (undated) DEVICE — SLEEVE SCD EXPRESS KNEE LENGTH MED 9529

## (undated) DEVICE — DRSG AQUACEL AG HYDROFIBER  3.5X10" 422605

## (undated) DEVICE — STRATAFIX 1 SYMMETRIC PDS 24" DYED W/40MM 1/2 CIRC SXPP1A444

## (undated) DEVICE — BLADE SAGITTAL 18MM X 90MM X 1.27MM BR4118-127-090

## (undated) DEVICE — PACK BASIN SET UP SUTCNBSBBA

## (undated) RX ORDER — FENTANYL CITRATE 50 UG/ML
INJECTION, SOLUTION INTRAMUSCULAR; INTRAVENOUS
Status: DISPENSED
Start: 2025-02-03

## (undated) RX ORDER — PROPOFOL 10 MG/ML
INJECTION, EMULSION INTRAVENOUS
Status: DISPENSED
Start: 2025-02-03

## (undated) RX ORDER — SEVOFLURANE 250 ML/250ML
LIQUID RESPIRATORY (INHALATION)
Status: DISPENSED
Start: 2025-02-03

## (undated) RX ORDER — ONDANSETRON 2 MG/ML
INJECTION INTRAMUSCULAR; INTRAVENOUS
Status: DISPENSED
Start: 2025-02-03